# Patient Record
Sex: MALE | Race: BLACK OR AFRICAN AMERICAN | NOT HISPANIC OR LATINO | Employment: OTHER | URBAN - METROPOLITAN AREA
[De-identification: names, ages, dates, MRNs, and addresses within clinical notes are randomized per-mention and may not be internally consistent; named-entity substitution may affect disease eponyms.]

---

## 2018-03-15 ENCOUNTER — HOSPITAL ENCOUNTER (EMERGENCY)
Facility: CLINIC | Age: 73
Discharge: HOME | End: 2018-03-15
Attending: EMERGENCY MEDICINE | Admitting: EMERGENCY MEDICINE
Payer: COMMERCIAL

## 2018-03-15 VITALS
SYSTOLIC BLOOD PRESSURE: 172 MMHG | WEIGHT: 215 LBS | HEIGHT: 69 IN | BODY MASS INDEX: 31.84 KG/M2 | RESPIRATION RATE: 16 BRPM | TEMPERATURE: 97.8 F | OXYGEN SATURATION: 94 % | DIASTOLIC BLOOD PRESSURE: 127 MMHG | HEART RATE: 130 BPM

## 2018-03-15 DIAGNOSIS — R33.9 URINARY RETENTION: ICD-10-CM

## 2018-03-15 DIAGNOSIS — Z97.8 FOLEY CATHETER IN PLACE: ICD-10-CM

## 2018-03-15 LAB
ALBUMIN UR-MCNC: NEGATIVE MG/DL
ANION GAP SERPL CALCULATED.3IONS-SCNC: 9 MMOL/L (ref 3–14)
APPEARANCE UR: CLEAR
BASOPHILS # BLD AUTO: 0 10E9/L (ref 0–0.2)
BASOPHILS NFR BLD AUTO: 0.4 %
BILIRUB UR QL STRIP: NEGATIVE
BUN SERPL-MCNC: 17 MG/DL (ref 7–30)
CALCIUM SERPL-MCNC: 9.3 MG/DL (ref 8.5–10.1)
CHLORIDE SERPL-SCNC: 101 MMOL/L (ref 94–109)
CO2 SERPL-SCNC: 24 MMOL/L (ref 20–32)
COLOR UR AUTO: NORMAL
CREAT SERPL-MCNC: 1.02 MG/DL (ref 0.66–1.25)
DIFFERENTIAL METHOD BLD: ABNORMAL
EOSINOPHIL # BLD AUTO: 0.1 10E9/L (ref 0–0.7)
EOSINOPHIL NFR BLD AUTO: 0.9 %
ERYTHROCYTE [DISTWIDTH] IN BLOOD BY AUTOMATED COUNT: 11.6 % (ref 10–15)
GFR SERPL CREATININE-BSD FRML MDRD: 72 ML/MIN/1.7M2
GLUCOSE SERPL-MCNC: 124 MG/DL (ref 70–99)
GLUCOSE UR STRIP-MCNC: NEGATIVE MG/DL
HCT VFR BLD AUTO: 36.6 % (ref 40–53)
HGB BLD-MCNC: 12.9 G/DL (ref 13.3–17.7)
HGB UR QL STRIP: NEGATIVE
IMM GRANULOCYTES # BLD: 0 10E9/L (ref 0–0.4)
IMM GRANULOCYTES NFR BLD: 0.3 %
KETONES UR STRIP-MCNC: NEGATIVE MG/DL
LEUKOCYTE ESTERASE UR QL STRIP: NEGATIVE
LYMPHOCYTES # BLD AUTO: 1.6 10E9/L (ref 0.8–5.3)
LYMPHOCYTES NFR BLD AUTO: 20.7 %
MCH RBC QN AUTO: 30.8 PG (ref 26.5–33)
MCHC RBC AUTO-ENTMCNC: 35.2 G/DL (ref 31.5–36.5)
MCV RBC AUTO: 87 FL (ref 78–100)
MONOCYTES # BLD AUTO: 0.3 10E9/L (ref 0–1.3)
MONOCYTES NFR BLD AUTO: 3.8 %
NEUTROPHILS # BLD AUTO: 5.9 10E9/L (ref 1.6–8.3)
NEUTROPHILS NFR BLD AUTO: 73.9 %
NITRATE UR QL: NEGATIVE
NRBC # BLD AUTO: 0 10*3/UL
NRBC BLD AUTO-RTO: 0 /100
PH UR STRIP: 5.5 PH (ref 5–7)
PLATELET # BLD AUTO: 352 10E9/L (ref 150–450)
POTASSIUM SERPL-SCNC: 3.8 MMOL/L (ref 3.4–5.3)
RBC # BLD AUTO: 4.19 10E12/L (ref 4.4–5.9)
RBC #/AREA URNS AUTO: <1 /HPF (ref 0–2)
SODIUM SERPL-SCNC: 134 MMOL/L (ref 133–144)
SOURCE: NORMAL
SP GR UR STRIP: 1.01 (ref 1–1.03)
UROBILINOGEN UR STRIP-MCNC: NORMAL MG/DL (ref 0–2)
WBC # BLD AUTO: 7.9 10E9/L (ref 4–11)
WBC #/AREA URNS AUTO: 0 /HPF (ref 0–5)

## 2018-03-15 PROCEDURE — 85025 COMPLETE CBC W/AUTO DIFF WBC: CPT | Performed by: EMERGENCY MEDICINE

## 2018-03-15 PROCEDURE — 25000125 ZZHC RX 250: Performed by: EMERGENCY MEDICINE

## 2018-03-15 PROCEDURE — 80048 BASIC METABOLIC PNL TOTAL CA: CPT | Performed by: EMERGENCY MEDICINE

## 2018-03-15 PROCEDURE — 81001 URINALYSIS AUTO W/SCOPE: CPT | Performed by: EMERGENCY MEDICINE

## 2018-03-15 PROCEDURE — 51701 INSERT BLADDER CATHETER: CPT

## 2018-03-15 PROCEDURE — 99283 EMERGENCY DEPT VISIT LOW MDM: CPT

## 2018-03-15 RX ADMIN — LIDOCAINE HYDROCHLORIDE 10 ML: 20 JELLY TOPICAL at 10:02

## 2018-03-15 ASSESSMENT — ENCOUNTER SYMPTOMS
ABDOMINAL PAIN: 1
FEVER: 0
DIFFICULTY URINATING: 1
HEMATURIA: 0
VOMITING: 0

## 2018-03-15 NOTE — ED AVS SNAPSHOT
Emergency Department    [address] [facility] [address]    Phone:  [phone]    Fax:  [phone]                                       Sam Barry   MRN: [id number]    Department:   Emergency Department   Date of Visit:  3/15/2018           Patient Information     Date Of Birth          1945        Your diagnoses for this visit were:     Urinary retention     Cardenas catheter in place        You were seen by Kyrie Mir MD.      Follow-up Information     Follow up with Catalino Hodge MD. Schedule an appointment as soon as possible for a visit in 1 week.    Specialty:  Urology    Why:  For repeat evaluation and symptom check    Contact information:    [facility]  [address] [id number] [phone]          Follow up with  Emergency Department.    Specialty:  EMERGENCY MEDICINE    Why:  If symptoms worsen    Contact information:    [address] [facility] [address] [phone]        Discharge Instructions         Urinary Retention (Male)  Urinary retention is the medical term for difficulty or inability to pass urine, even though your bladder is full.  Causes  The most common cause of urinary retention in men is the bladder outlet being blocked. This can be due to an enlarged prostate gland or a bladder infection. Certain medicines can also cause this problem. This condition is more likely to occur as men get older.    This condition is treated by insertion of a catheter into the bladder to drain the urine. This provides immediate relief. The catheter may need to remain in place for a few days to prevent a recurrence. The catheter has a balloon on the tip which was inflated after insertion. This prevents the catheter from falling out.  Symptoms  Common symptoms of urinary retention include:    Pain (not experienced by everyone)    Frequent urination    Feeling that the bladder is still full after  urinating    Incontinence (not being able to control the release of urine)    Swollen abdomen  Treatment  This condition is treated by inserting a tube (catheter) into the bladder to drain the urine. This provides immediate relief. The catheter may need to stay in place for a few days. The catheter has a balloon on the tip, which is inflated after insertion. This prevents the catheter from falling out.  Home care    If you were given antibiotics, take them until they are used up, or your healthcare provider tells you to stop. It is important to finish the antibiotics even though you feel better. This is to make sure your infection has cleared.    If a catheter was left in place, it is important to keep bacteria from getting into the collection bag. Do not disconnect the catheter from the collection bag.    Use a leg band to secure the drainage tube, so it does not pull on the catheter. Drain the collection bag when it becomes full using the drain spout at the bottom of the bag.    Do not pull on or try to remove your catheter. This will injure your urethra. The catheter must be removed by a healthcare provider.  Follow-up care  Follow up with your healthcare provider, or as advised.  If a catheter was left in place, it can usually be removed within 3 to 7 days. Some conditions require the catheter to stay in longer. Your healthcare provider will tell you when to return to have the catheter removed.  When to seek medical advice  Call your healthcare provider right away if any of these occur:    Fever of 100.4 F (38 C) or higher, or as directed by your healthcare provider    Bladder or lower-abdominal pain or fullness    Abdominal swelling, nausea, vomiting, or back pain    Blood or urine leakage around the catheter    Bloody urine coming from the catheter (if a new symptom)    Weakness, dizziness, or fainting    Confusion or change in usual level of alertness    If a catheter was left in place, return if:    Catheter  falls out    Catheter stops draining for 6 hours  Date Last Reviewed: 7/26/2015 2000-2017 [employer]. [address] [place] [address], [place] [address]. All rights reserved. This information is not intended as a substitute for professional medical care. Always follow your healthcare professional's instructions.      As discussed today, your retention is likely due to either an enlarged prostate or possibly from medications in your over-the-counter cold medicine.  Please do not take any medicine and has diphenhydramine or Benadryl and it, and please do follow with Dr. Hodge or your own neurologist as we discussed.          24 Hour Appointment Hotline       To make an appointment at any [facility], call [phone] ([phone]). If you don't have a family doctor or clinic, we will help you find one. [facility] clinics are conveniently located to serve the needs of you and your family.             Review of your medicines      Notice     You have not been prescribed any medications.            Procedures and tests performed during your visit     Basic metabolic panel    CBC with platelets differential    UA with Microscopic      Orders Needing Specimen Collection     None      Pending Results     No orders found from 3/13/2018 to 3/16/2018.            Pending Culture Results     No orders found from 3/13/2018 to 3/16/2018.            Pending Results Instructions     If you had any lab results that were not finalized at the time of your Discharge, you can call the ED Lab Result RN at [phone]. You will be contacted by this team for any positive Lab results or changes in treatment. The nurses are available 7 days a week from 10A to 6:30P.  You can leave a message 24 hours per day and they will return your call.        Test Results From Your Hospital Stay        3/15/2018 10:23 AM      Component Results     Component Value Ref Range & Units Status    WBC 7.9 4.0 - 11.0 10e9/L Final    RBC Count  4.19 (L) 4.4 - 5.9 10e12/L Final    Hemoglobin 12.9 (L) 13.3 - 17.7 g/dL Final    Hematocrit 36.6 (L) 40.0 - 53.0 % Final    MCV 87 78 - 100 fl Final    MCH 30.8 26.5 - 33.0 pg Final    MCHC 35.2 31.5 - 36.5 g/dL Final    RDW 11.6 10.0 - 15.0 % Final    Platelet Count 352 150 - 450 10e9/L Final    Diff Method Automated Method  Final    % Neutrophils 73.9 % Final    % Lymphocytes 20.7 % Final    % Monocytes 3.8 % Final    % Eosinophils 0.9 % Final    % Basophils 0.4 % Final    % Immature Granulocytes 0.3 % Final    Nucleated RBCs 0 0 /100 Final    Absolute Neutrophil 5.9 1.6 - 8.3 10e9/L Final    Absolute Lymphocytes 1.6 0.8 - 5.3 10e9/L Final    Absolute Monocytes 0.3 0.0 - 1.3 10e9/L Final    Absolute Eosinophils 0.1 0.0 - 0.7 10e9/L Final    Absolute Basophils 0.0 0.0 - 0.2 10e9/L Final    Abs Immature Granulocytes 0.0 0 - 0.4 10e9/L Final    Absolute Nucleated RBC 0.0  Final         3/15/2018 10:46 AM      Component Results     Component Value Ref Range & Units Status    Sodium 134 133 - 144 mmol/L Final    Potassium 3.8 3.4 - 5.3 mmol/L Final    Chloride 101 94 - 109 mmol/L Final    Carbon Dioxide 24 20 - 32 mmol/L Final    Anion Gap 9 3 - 14 mmol/L Final    Glucose 124 (H) 70 - 99 mg/dL Final    Urea Nitrogen 17 7 - 30 mg/dL Final    Creatinine 1.02 0.66 - 1.25 mg/dL Final    GFR Estimate 72 >60 mL/min/1.7m2 Final    Non  GFR Calc    GFR Estimate If Black 87 >60 mL/min/1.7m2 Final    African American GFR Calc    Calcium 9.3 8.5 - 10.1 mg/dL Final         3/15/2018 10:24 AM      Component Results     Component Value Ref Range & Units Status    Color Urine Light Yellow  Final    Appearance Urine Clear  Final    Glucose Urine Negative NEG^Negative mg/dL Final    Bilirubin Urine Negative NEG^Negative Final    Ketones Urine Negative NEG^Negative mg/dL Final    Specific Gravity Urine 1.007 1.003 - 1.035 Final    Blood Urine Negative NEG^Negative Final    pH Urine 5.5 5.0 - 7.0 pH Final    Protein  Albumin Urine Negative NEG^Negative mg/dL Final    Urobilinogen mg/dL Normal 0.0 - 2.0 mg/dL Final    Nitrite Urine Negative NEG^Negative Final    Leukocyte Esterase Urine Negative NEG^Negative Final    Source Catheterized Urine  Final    WBC Urine 0 0 - 5 /HPF Final    RBC Urine <1 0 - 2 /HPF Final                Clinical Quality Measure: Blood Pressure Screening     Your blood pressure was checked while you were in the emergency department today. The last reading we obtained was  BP: (!) 172/127 . Please read the guidelines below about what these numbers mean and what you should do about them.  If your systolic blood pressure (the top number) is less than 120 and your diastolic blood pressure (the bottom number) is less than 80, then your blood pressure is normal. There is nothing more that you need to do about it.  If your systolic blood pressure (the top number) is 120-139 or your diastolic blood pressure (the bottom number) is 80-89, your blood pressure may be higher than it should be. You should have your blood pressure rechecked within a year by a primary care provider.  If your systolic blood pressure (the top number) is 140 or greater or your diastolic blood pressure (the bottom number) is 90 or greater, you may have high blood pressure. High blood pressure is treatable, but if left untreated over time it can put you at risk for heart attack, stroke, or kidney failure. You should have your blood pressure rechecked by a primary care provider within the next 4 weeks.  If your provider in the emergency department today gave you specific instructions to follow-up with your doctor or provider even sooner than that, you should follow that instruction and not wait for up to 4 weeks for your follow-up visit.        Thank you for choosing [facility]       Thank you for choosing [facility] for your care. Our goal is always to provide you with excellent care. Hearing back from our patients is one way we can continue to  "improve our services. Please take a few minutes to complete the written survey that you may receive in the mail after you visit with us. Thank you!        [employer] Information     [employer] lets you send messages to your doctor, view your test results, renew your prescriptions, schedule appointments and more. To sign up, go to [email or web address] . Click on \"Log in\" on the left side of the screen, which will take you to the Welcome page. Then click on \"Sign up Now\" on the right side of the page.     You will be asked to enter the access code listed below, as well as some personal information. Please follow the directions to create your username and password.     Your access code is: [email or web address]  Expires: 2018 11:42 AM     Your access code will  in 90 days. If you need help or a new code, please call your [place] clinic or [phone].        Care EveryWhere ID     This is your Care EveryWhere ID. This could be used by other organizations to access your [place] medical records  [id number]        Equal Access to Services     [facility] CenterESTEE : Hadii samira Chapa, wafrank cisneros, qaclaribel esquivelalparadise hanley, alejandro arguello. So [facility] [phone].    ATENCIÓN: Si habla español, tiene a de leon disposición servicios gratuitos de asistencia lingüística. Aida al [phone].    We comply with applicable federal civil rights laws and [place] laws. We do not discriminate on the basis of race, color, national origin, age, disability, sex, sexual orientation, or gender identity.            After Visit Summary       This is your record. Keep this with you and show to your community pharmacist(s) and doctor(s) at your next visit.                  "

## 2018-03-15 NOTE — ED AVS SNAPSHOT
Emergency Department    [address] [place] [address]    Phone:  [phone]    Fax:  [phone]                                       Sam Barry   MRN: [id number]    Department:   Emergency Department   Date of Visit:  3/15/2018           After Visit Summary Signature Page     I have received my discharge instructions, and my questions have been answered. I have discussed any challenges I see with this plan with the nurse or doctor.    ..........................................................................................................................................  Patient/Patient Representative Signature      ..........................................................................................................................................  Patient Representative Print Name and Relationship to Patient    ..................................................               ................................................  Date                                            Time    ..........................................................................................................................................  Reviewed by Signature/Title    ...................................................              ..............................................  Date                                                            Time

## 2018-03-15 NOTE — ED PROVIDER NOTES
"  History     Chief Complaint:  Urinary Urgency and Retention       HPI   Sam Barry is a 73 year old male with a history of hyperlipidemia and BPH who presents to the emergency department for evaluation of urinary retention and urgency. The patient states that he has had gradually increasing urinary urgency and inability to void his bladder for the past 1-2 days, stating that the last time he had a significant void was last night. He has had increasing suprapubic abdominal discomfort secondary to these symptoms as well. Of note, the patient has had issues with urinary retention in the past secondary to BPH. The patient does state that he has been taking Benadryl in the evening as of late. He denies any recent measured fevers, vomiting, or hematuria.       Allergies:  No Known Allergies     Medications:    Multivitamins    Past Medical History:    Mild cognitive impairment  Hyperlipidemia  Osteopenia  BPH  Tachycardia  Arthritis    Past Surgical History:    appendectomy    Family History:    DM    Social History:  Presents with his wife.   Negative for tobacco use.  Negative for alcohol use.  Marital Status:   [2]    Review of Systems   Constitutional: Negative for fever.   Gastrointestinal: Positive for abdominal pain. Negative for vomiting.   Genitourinary: Positive for difficulty urinating and urgency. Negative for hematuria.   All other systems reviewed and are negative.    Physical Exam     Patient Vitals for the past 24 hrs:   BP Temp Temp src Pulse Resp SpO2 Height Weight   03/15/18 1115 - - - - - 94 % - -   03/15/18 1100 - - - - - 93 % - -   03/15/18 1045 - - - - - 93 % - -   03/15/18 1030 - - - - - 95 % - -   03/15/18 1015 - - - - - 96 % - -   03/15/18 1000 - - - - - 98 % - -   03/15/18 0955 (!) 172/127 97.8  F (36.6  C) Oral 130 16 97 % 1.753 m (5' 9\") 97.5 kg (215 lb)       Physical Exam  Vitals: reviewed by me  General: Pt seen on [facility], pleasant, cooperative, and alert to " conversation  Eyes: Tracking well, clear conjunctiva BL  ENT: MMM, midline trachea.   Lungs:   No tachypnea, no accessory muscle use. No respiratory distress.   CV: Rate as above, regular rhythm.    Abd: Soft, non tender, no guarding, no rebound. Non distended  MSK: no peripheral edema or joint effusion.  No evidence of trauma.  Has Cardenas catheter in, with bag draining roughly 600 cc of clear yellow fluid.   exam- normal ext exam.     Skin: No rash, normal turgor and temperature  Neuro: Clear speech and no facial droop.  Psych: Not RIS, no e/o AH/VH      Emergency Department Course   Laboratory:  CBC: WBC: 7.9, HGB: 12.9 (L), PLT: 352  BMP: Glucose 124 (H), o/w WNL (Creatinine: 1.02)    UA with micro (catheterized): all negative    Interventions:  1002 Lidocaine 2% 10 mL Urethral     Emergency Department Course:  Nursing notes and vitals reviewed. 1003 I performed an exam of the patient as documented above.     The patient had a urinary catheter placed here in the emergency department without difficulty. The patient provided a urine sample here in the emergency department. This was sent for laboratory testing, findings above.     Blood drawn. This was sent to the lab for further testing, results above.    1129 I rechecked the patient and discussed the results of his workup thus far.     Findings and plan explained to the Patient. Patient discharged home with instructions regarding supportive care, medications, and reasons to return. The importance of close follow-up was reviewed.     I personally reviewed the laboratory results with the Patient and answered all related questions prior to discharge.     Impression & Plan    Medical Decision Making:  Sam Barry is a 73 year old male who presents to the ED with what appears to be urinary retention. I feel that this diagnosis is supported by the fact that the patient had a large suprapubic abdominal mass, and that all of his pain has been resolved with Cardenas  placement, draining roughly 600-700 ccs of urine. The patient has a normal creatinine here, no evidence of renal failure. No evidence of UTI, no systemic signs of infection. The patient may have two reasons for his urinary retention today as he does have a history of enlarged prostate and also has had a cold lately and has taken lots of Benadryl containing OTC medications.  The patient is stable to be discharged with a leg bag to follow up with his previous urologist, or Dr. Ahumada, who is on call for urology next week. The patient is good with this plan and no signs of systemic infection. All questions were answered and the patient was discharged in good condition.     Diagnosis:    ICD-10-CM   1. Urinary retention R33.9   2. Cardenas catheter in place Z92.89       Disposition:  discharged to home    Ernestina MCCRACKEN, am serving as a scribe on 3/15/2018 at 10:03 AM to personally document services performed by Kyrie Mir MD based on my observations and the provider's statements to me.       Ernestina Burton   3/15/2018    EMERGENCY DEPARTMENT       Kyrie Mir MD  03/15/18 0523

## 2018-03-15 NOTE — DISCHARGE INSTRUCTIONS
Urinary Retention (Male)  Urinary retention is the medical term for difficulty or inability to pass urine, even though your bladder is full.  Causes  The most common cause of urinary retention in men is the bladder outlet being blocked. This can be due to an enlarged prostate gland or a bladder infection. Certain medicines can also cause this problem. This condition is more likely to occur as men get older.    This condition is treated by insertion of a catheter into the bladder to drain the urine. This provides immediate relief. The catheter may need to remain in place for a few days to prevent a recurrence. The catheter has a balloon on the tip which was inflated after insertion. This prevents the catheter from falling out.  Symptoms  Common symptoms of urinary retention include:    Pain (not experienced by everyone)    Frequent urination    Feeling that the bladder is still full after urinating    Incontinence (not being able to control the release of urine)    Swollen abdomen  Treatment  This condition is treated by inserting a tube (catheter) into the bladder to drain the urine. This provides immediate relief. The catheter may need to stay in place for a few days. The catheter has a balloon on the tip, which is inflated after insertion. This prevents the catheter from falling out.  Home care    If you were given antibiotics, take them until they are used up, or your healthcare provider tells you to stop. It is important to finish the antibiotics even though you feel better. This is to make sure your infection has cleared.    If a catheter was left in place, it is important to keep bacteria from getting into the collection bag. Do not disconnect the catheter from the collection bag.    Use a leg band to secure the drainage tube, so it does not pull on the catheter. Drain the collection bag when it becomes full using the drain spout at the bottom of the bag.    Do not pull on or try to remove your catheter.  This will injure your urethra. The catheter must be removed by a healthcare provider.  Follow-up care  Follow up with your healthcare provider, or as advised.  If a catheter was left in place, it can usually be removed within 3 to 7 days. Some conditions require the catheter to stay in longer. Your healthcare provider will tell you when to return to have the catheter removed.  When to seek medical advice  Call your healthcare provider right away if any of these occur:    Fever of 100.4 F (38 C) or higher, or as directed by your healthcare provider    Bladder or lower-abdominal pain or fullness    Abdominal swelling, nausea, vomiting, or back pain    Blood or urine leakage around the catheter    Bloody urine coming from the catheter (if a new symptom)    Weakness, dizziness, or fainting    Confusion or change in usual level of alertness    If a catheter was left in place, return if:    Catheter falls out    Catheter stops draining for 6 hours  Date Last Reviewed: 7/26/2015 2000-2017 [employer]. [address] [place] [address]. All rights reserved. This information is not intended as a substitute for professional medical care. Always follow your healthcare professional's instructions.      As discussed today, your retention is likely due to either an enlarged prostate or possibly from medications in your over-the-counter cold medicine.  Please do not take any medicine and has diphenhydramine or Benadryl and it, and please do follow with Dr. Hodge or your own neurologist as we discussed.

## 2018-03-15 NOTE — ED NOTES
Jean Pierre LUNDY changing over to leg bag with teaching and an additional leg bag given per request by wife.

## 2018-03-17 ENCOUNTER — HOSPITAL ENCOUNTER (EMERGENCY)
Facility: CLINIC | Age: 73
Discharge: HOME | End: 2018-03-17
Attending: EMERGENCY MEDICINE | Admitting: EMERGENCY MEDICINE
Payer: COMMERCIAL

## 2018-03-17 VITALS
OXYGEN SATURATION: 96 % | RESPIRATION RATE: 18 BRPM | TEMPERATURE: 98.4 F | SYSTOLIC BLOOD PRESSURE: 138 MMHG | DIASTOLIC BLOOD PRESSURE: 109 MMHG

## 2018-03-17 DIAGNOSIS — Z46.6 URINARY CATHETER (FOLEY) CHANGE REQUIRED: ICD-10-CM

## 2018-03-17 PROCEDURE — 51702 INSERT TEMP BLADDER CATH: CPT

## 2018-03-17 PROCEDURE — 99283 EMERGENCY DEPT VISIT LOW MDM: CPT

## 2018-03-17 NOTE — ED NOTES
Pt and wife were instructed on good catheater care and how to change out the drainage bags. Pt and wife were given the plugs both grey for leg bag and yellow for larger bag. Good placement and urine returned from [place].

## 2018-03-17 NOTE — ED AVS SNAPSHOT
Emergency Department    [address] [place] [address]    Phone:  [phone]    Fax:  [phone]                                       Sam Barry   MRN: [id number]    Department:   Emergency Department   Date of Visit:  3/17/2018           Patient Information     Date Of Birth          1945        Your diagnoses for this visit were:     Urinary catheter (Cardenas) change required        You were seen by Ricki Roman MD.      Follow-up Information     Please follow up.    Why:  with your urologist in monday        Discharge Instructions         Cardenas Catheter Care    A Cardenas catheter is a rubber tube that is placed through the urethra (opening where urine comes out) and into the bladder. This helps drain urine from the bladder. There is a small balloon on the end of the tube that is inflated after insertion. This keeps the catheter from sliding out of the bladder.  A Cardenas catheter is used to treat urinary retention (unable to pass urine). It is also used when there is incontinence (loss of bladder control).  Home care    Finish taking any prescribed antibiotic even if you are feeling better before then.    It is important to keep bacteria from getting into the collection bag. Do not disconnect the catheter from the collection bag.    Use a leg band to secure the drainage tube, so it does not pull on the catheter. Drain the collection bag when it becomes full using the drain spout at the bottom of the bag.    Do not try to pull or remove your catheter. This will injure your urethra. It must be removed by your healthcare provider or nurse.  Follow-up care  Follow up with your healthcare provider as advised for repeat urine testing and catheter removal or replacement.  When to seek medical advice  Call your healthcare provider right away if any of these occur:    Fever of 100.4 F (38 C) or higher, or as directed by your healthcare provider    Bladder pain or  fullness    Abdominal swelling, nausea or vomiting, or back pain    Blood or urine leakage around the catheter    Bloody urine coming from the catheter (if a new symptom)    Catheter falls out    Catheter stops draining for 6 hours    Weakness, dizziness, or fainting  Date Last Reviewed: 10/1/2016    2074-8718 [employer]. [address] [place] [address]. All rights reserved. This information is not intended as a substitute for professional medical care. Always follow your healthcare professional's instructions.          Future Appointments        Provider Department Dept Phone Center    3/19/2018 4:00 PM Barbara Varghese PA-C, [facility] [place] [phone] [facility] [place]      24 Hour Appointment Hotline       To make an appointment at any [facility], call [phone] ([phone]). If you don't have a family doctor or clinic, we will help you find one. [facility] are conveniently located to serve the needs of you and your family.             Review of your medicines      Notice     You have not been prescribed any medications.            Orders Needing Specimen Collection     None      Pending Results     No orders found from 3/15/2018 to 3/18/2018.            Pending Culture Results     No orders found from 3/15/2018 to 3/18/2018.            Pending Results Instructions     If you had any lab results that were not finalized at the time of your Discharge, you can call the ED Lab Result RN at [phone]. You will be contacted by this team for any positive Lab results or changes in treatment. The nurses are available 7 days a week from 10A to 6:30P.  You can leave a message 24 hours per day and they will return your call.        Test Results From Your Hospital Stay               Clinical Quality Measure: Blood Pressure Screening     Your blood pressure was checked while you were in the emergency department today. The last reading we  "obtained was  BP: (!) 138/109 . Please read the guidelines below about what these numbers mean and what you should do about them.  If your systolic blood pressure (the top number) is less than 120 and your diastolic blood pressure (the bottom number) is less than 80, then your blood pressure is normal. There is nothing more that you need to do about it.  If your systolic blood pressure (the top number) is 120-139 or your diastolic blood pressure (the bottom number) is 80-89, your blood pressure may be higher than it should be. You should have your blood pressure rechecked within a year by a primary care provider.  If your systolic blood pressure (the top number) is 140 or greater or your diastolic blood pressure (the bottom number) is 90 or greater, you may have high blood pressure. High blood pressure is treatable, but if left untreated over time it can put you at risk for heart attack, stroke, or kidney failure. You should have your blood pressure rechecked by a primary care provider within the next 4 weeks.  If your provider in the emergency department today gave you specific instructions to follow-up with your doctor or provider even sooner than that, you should follow that instruction and not wait for up to 4 weeks for your follow-up visit.        Thank you for choosing [facility]       Thank you for choosing [facility] for your care. Our goal is always to provide you with excellent care. Hearing back from our patients is one way we can continue to improve our services. Please take a few minutes to complete the written survey that you may receive in the mail after you visit with us. Thank you!        [employer] Information     [employer] lets you send messages to your doctor, view your test results, renew your prescriptions, schedule appointments and more. To sign up, go to [email or web address] . Click on \"Log in\" on the left side of the screen, which will take you to the Welcome page. Then click on \"Sign up Now\" on " the right side of the page.     You will be asked to enter the access code listed below, as well as some personal information. Please follow the directions to create your username and password.     Your access code is: [email or web address]  Expires: 2018 11:42 AM     Your access code will  in 90 days. If you need help or a new code, please call your [facility] clinic or [phone].        Care EveryWhere ID     This is your Care EveryWhere ID. This could be used by other organizations to access your [facility] medical records  [id number]        Equal Access to Services     [facility] : Neha Chapa, alyse cisneros, nilda hanley, alejandro murray . So [facility] [phone].    ATENCIÓN: Si habla español, tiene a de leon disposición servicios gratuitos de asistencia lingüística. Llame al [phone].    We comply with applicable federal civil rights laws and [place] laws. We do not discriminate on the basis of race, color, national origin, age, disability, sex, sexual orientation, or gender identity.            After Visit Summary       This is your record. Keep this with you and show to your community pharmacist(s) and doctor(s) at your next visit.

## 2018-03-17 NOTE — ED PROVIDER NOTES
"  History     Chief Complaint:    Catheter Problem       HPI   Sam Barry is a 73 year old male with a history of hyperlipidemia and BPH who presents to the ED today with a catheter problem. The patient was seen here in the ED 2 days ago for urinary urgency and retention. At that time, he was complaining of gradually increased urgency and the inability to void his bladder for 1-2 days prior. He had increased suprapubic abdominal discomfort at the time as well. Of note, the patient has had issues with urinary retention in the past secondary to BPH. The patient had a urinary catheter placed at the time and had negative urinalysis results. He was discharged home with a leg bag and told to follow up with  Dr. Ahumada, a urologist, next week. The patient presents to the ED today, stating that his \"urinary bag came off.\" The patient's wife states that the patient has been \"leaking\" urine since he was discharged from the ED. She states that they woke up in the morning yesterday and the bed was wet since the urine was not going into the bag. She believed that it was because they were not given a night bag for the urine, but even after receiving one, still had similar problems. She brought the patient in to the ED today so that his catheter could be fixed.     Allergies:  No Known Allergies      Medications:    Multivitamins     Past Medical History:    Mild cognitive impairment  Hyperlipidemia  Osteopenia  BPH  Tachycardia  Arthritis     Past Surgical History:    appendectomy     Family History:    DM    Social History:  Marital Status:    Presents to the ED with wife   Tobacco Use: never smoker   Alcohol Use: no   PCP: [facility]     Review of Systems   All other systems reviewed and are negative.    Physical Exam     Patient Vitals for the past 24 hrs:   BP Temp Temp src Heart Rate SpO2   03/17/18 0838 (!) 138/109 98.4  F (36.9  C) Oral 119 96 %        Physical Exam  General: No distress.   Head: " No signs of trauma.   Mouth/Throat: Oropharynx moist.   Eyes: Conjunctivae are normal. Pupils are equal..   Neck: Normal range of motion.    Resp:No respiratory distress.   MSK: Normal range of motion. No obvious deformity.  Neuro: The patient is alert and interactive. MOYA. Speech normal. GCS 15  Skin: No lesion or sign of trauma noted.   Psych: normal mood and affect. behavior is normal.      Emergency Department Course    Emergency Department Course:  Nursing notes and vitals reviewed.  (4487) I performed an exam of the patient as documented above.   Patient's catheter was removed and replaced.   Findings and plan explained to the patient and his wife. Patient discharged home with instructions regarding supportive care, medications, and reasons to return. The importance of close follow-up was reviewed.  Impression & Plan    Medical Decision Making:  Sam Barry is a 73 year old male who presents for concerns with his clark catheter. The patient was seen two days ago for urinary retention and urgency and had a clark catheter placed at the time. He presented to the ED again due to problems with his catheter draining into the bag. Patient's clark catheter was changed while in the ED. Patient is stable for discharge home.      Diagnosis:    ICD-10-CM    1. Urinary catheter (Clark) change required Z46.6        Disposition:  discharged to home    IKeri, am serving as a scribe on 3/17/2018 at 8:56 AM to personally document services performed by Dr. Roman based on my observations and the provider's statements to me.    3/17/2018    EMERGENCY DEPARTMENT       Ricki Roman MD  03/18/18 0616

## 2018-03-17 NOTE — ED AVS SNAPSHOT
Emergency Department    [address] [place] [address]    Phone:  [phone]    Fax:  [phone]                                       Sam Barry   MRN: [id number]    Department:   Emergency Department   Date of Visit:  3/17/2018           After Visit Summary Signature Page     I have received my discharge instructions, and my questions have been answered. I have discussed any challenges I see with this plan with the nurse or doctor.    ..........................................................................................................................................  Patient/Patient Representative Signature      ..........................................................................................................................................  Patient Representative Print Name and Relationship to Patient    ..................................................               ................................................  Date                                            Time    ..........................................................................................................................................  Reviewed by Signature/Title    ...................................................              ..............................................  Date                                                            Time

## 2018-03-17 NOTE — DISCHARGE INSTRUCTIONS
Cardenas Catheter Care    A Cardenas catheter is a rubber tube that is placed through the urethra (opening where urine comes out) and into the bladder. This helps drain urine from the bladder. There is a small balloon on the end of the tube that is inflated after insertion. This keeps the catheter from sliding out of the bladder.  A Cardenas catheter is used to treat urinary retention (unable to pass urine). It is also used when there is incontinence (loss of bladder control).  Home care    Finish taking any prescribed antibiotic even if you are feeling better before then.    It is important to keep bacteria from getting into the collection bag. Do not disconnect the catheter from the collection bag.    Use a leg band to secure the drainage tube, so it does not pull on the catheter. Drain the collection bag when it becomes full using the drain spout at the bottom of the bag.    Do not try to pull or remove your catheter. This will injure your urethra. It must be removed by your healthcare provider or nurse.  Follow-up care  Follow up with your healthcare provider as advised for repeat urine testing and catheter removal or replacement.  When to seek medical advice  Call your healthcare provider right away if any of these occur:    Fever of 100.4 F (38 C) or higher, or as directed by your healthcare provider    Bladder pain or fullness    Abdominal swelling, nausea or vomiting, or back pain    Blood or urine leakage around the catheter    Bloody urine coming from the catheter (if a new symptom)    Catheter falls out    Catheter stops draining for 6 hours    Weakness, dizziness, or fainting  Date Last Reviewed: 10/1/2016    5903-0510 [employer]. [address] [place] [address], [place] [address]. All rights reserved. This information is not intended as a substitute for professional medical care. Always follow your healthcare professional's instructions.

## 2018-03-19 ENCOUNTER — OFFICE VISIT (OUTPATIENT)
Dept: UROLOGY | Facility: CLINIC | Age: 73
End: 2018-03-19
Payer: COMMERCIAL

## 2018-03-19 VITALS
BODY MASS INDEX: 31.84 KG/M2 | WEIGHT: 215 LBS | DIASTOLIC BLOOD PRESSURE: 88 MMHG | HEIGHT: 69 IN | SYSTOLIC BLOOD PRESSURE: 138 MMHG

## 2018-03-19 DIAGNOSIS — N40.1 BENIGN PROSTATIC HYPERPLASIA WITH URINARY RETENTION: Primary | ICD-10-CM

## 2018-03-19 DIAGNOSIS — K59.00 CONSTIPATION, UNSPECIFIED CONSTIPATION TYPE: ICD-10-CM

## 2018-03-19 DIAGNOSIS — R33.8 BENIGN PROSTATIC HYPERPLASIA WITH URINARY RETENTION: Primary | ICD-10-CM

## 2018-03-19 PROCEDURE — 99202 OFFICE O/P NEW SF 15 MIN: CPT | Performed by: PHYSICIAN ASSISTANT

## 2018-03-19 RX ORDER — SENNOSIDES 8.6 MG
1-3 TABLET ORAL DAILY PRN
Qty: 20 TABLET | Refills: 0 | Status: SHIPPED | OUTPATIENT
Start: 2018-03-19 | End: 2023-02-09

## 2018-03-19 RX ORDER — TAMSULOSIN HYDROCHLORIDE 0.4 MG/1
0.4 CAPSULE ORAL DAILY
Qty: 30 CAPSULE | Refills: 1 | Status: SHIPPED | OUTPATIENT
Start: 2018-03-19 | End: 2018-08-07

## 2018-03-19 RX ORDER — DIPHENOXYLATE HYDROCHLORIDE AND ATROPINE SULFATE 2.5; .025 MG/1; MG/1
1 TABLET ORAL DAILY
COMMUNITY
Start: 2017-12-21

## 2018-03-19 RX ORDER — OXYBUTYNIN CHLORIDE 5 MG/1
5 TABLET, EXTENDED RELEASE ORAL DAILY
Qty: 7 TABLET | Refills: 0 | Status: SHIPPED | OUTPATIENT
Start: 2018-03-19 | End: 2018-09-17

## 2018-03-19 ASSESSMENT — PAIN SCALES - GENERAL: PAINLEVEL: EXTREME PAIN (8)

## 2018-03-19 NOTE — NURSING NOTE
Chief Complaint   Patient presents with     Consult     Pt having voiding issues. Pt was cathed in ER.     Anh Nam

## 2018-03-19 NOTE — PROGRESS NOTES
"CC: Urinary retention.    HPI: It is a pleasure to see . Sam Barry, a 73 year old male seen today in the urology clinic in consultation from  ER for evaluation of urinary retention. This developed acutely requiring Cardenas catheter placement on 3/15/18. This has been draining well with pale, yellow urine. The patient is tolerating the catheter without too much issue, but some bladder spasms. No clots of hematuria noted.    The patient has no prior history of AUR or similar symptoms. At baseline, patient does note some weak stream, dribbling.  Was on Flomax in 2015 and stopped this because he did not want to be on medications. Currently denies fevers, chills, N/V, abdominal/back pain.    No past medical history on file.  No past surgical history on file.  Current Outpatient Prescriptions   Medication Sig Dispense Refill     cholecalciferol (D3-50) 04154 UNITS capsule Take 50,000 Units by mouth       tamsulosin (FLOMAX) 0.4 MG capsule Take 1 capsule (0.4 mg) by mouth daily 30 capsule 1     sennosides (SENOKOT) 8.6 MG tablet Take 1-3 tablets by mouth daily as needed for constipation 20 tablet 0     oxybutynin (DITROPAN XL) 5 MG 24 hr tablet Take 1 tablet (5 mg) by mouth daily 7 tablet 0     cod liver oil CAPS capsule Take 1 capsule by mouth       Multiple Vitamin (MULTI-VITAMINS) TABS Take 1 tablet by mouth       No Known Allergies  Family History: There is no h/o  malignancy.  There is no h/o urolithiasis.     Social History: The patient does not smoke cigarettes.  Denies EtOH and illicit drug use.      ROS: A comprehensive 14 point ROS was obtained and was  otherwise negative except for that outlined above in the HPI.    PHYSICAL EXAM:   Vitals:    03/19/18 1553   BP: 138/88   Weight: 97.5 kg (215 lb)   Height: 1.753 m (5' 9\")     GENERAL: Well groomed/well developed/well nourished male in NAD.  HEENT: EOMI, AT, NC.  SKIN: Warm to touch, dry.  No visible rashes or lesions.  RESP: No increased respiratory " effort.  LYMPH: No LE edema.  ABD: Soft, NT, ND.  No CVAT.  MS: Full ROM in extremities.  : Cardenas catheter indwelling draining yellow urine.  NEURO: Alert and oriented x 3.  PSYCH: Normal mood and affect, pleasant and agreeable during interview and exam.    REVIEW OF OUTSIDE RECORDS: 5 minutes spent reviewing previous/outside records.    ASSESSMENT/PLAN:  73 year old male who developed acute urinary retention requiring Cardenas catheter placement. Has not been taking tamsulosin daily.   -Delay TOV 7 days  -Start Flomax and can take oxybutynin for spasms. Bowel regimen.  -Reviewed TOV procedure and follow up if passes next week.  -If he wishes to be off medications, will need cysto to determine if he is a surgical candidate. Will discuss further in follow up.     I have enjoyed participating in the medical care of this patient.  Please do not hesitate to contact me with any questions or concerns.      Barbara Varghese PA-C  [facility]    20 min spent with the patient, >50% of this time was spent in a face-to-face manner and on coordination of care of urinary retention.

## 2018-03-19 NOTE — PATIENT INSTRUCTIONS
Flomax and start after evening meal.     Senna is the stool softner.     See you in 1 week for trail of void.     Ditropan is to help relax the bladder with the cath in place.    Creatinine (kidneys) 1.02.

## 2018-03-19 NOTE — MR AVS SNAPSHOT
After Visit Summary   3/19/2018    Sam Barry    MRN: [id number]           Patient Information     Date Of Birth          1945        Visit Information        Provider Department      3/19/2018 4:00 PM Barbara Varghese PA-C [facility] [place]        Today's Diagnoses     Benign prostatic hyperplasia with urinary retention    -  1    Constipation, unspecified constipation type          Care Instructions     Flomax and start after evening meal.     Senna is the stool softner.     See you in 1 week for trail of void.     Ditropan is to help relax the bladder with the cath in place.    Creatinine (kidneys) 1.02.          Follow-ups after your visit        Your next 10 appointments already scheduled     Mar 26, 2018 10:30 AM CDT   Return Visit with Barbara Varghese PA-C   [facility] [place] ([facility] [place])    [address]  [address]  [facility] [id number] [phone]              Who to contact     If you have questions or need follow up information about today's clinic visit or your schedule please contact [facility] UROLOGY [facility] directly at [phone].  Normal or non-critical lab and imaging results will be communicated to you by MyChart, letter or phone within 4 business days after the clinic has received the results. If you do not hear from us within 7 days, please contact the clinic through MyChart or phone. If you have a critical or abnormal lab result, we will notify you by phone as soon as possible.  Submit refill requests through [employer] or call your pharmacy and they will forward the refill request to us. Please allow 3 business days for your refill to be completed.          Additional Information About Your Visit        MyChart Information     [employer] lets you send messages to your doctor, view your test results, renew your prescriptions, schedule appointments  "and more. To sign up, go to [email or web address] . Click on \"Log in\" on the left side of the screen, which will take you to the Welcome page. Then click on \"Sign up Now\" on the right side of the page.     You will be asked to enter the access code listed below, as well as some personal information. Please follow the directions to create your username and password.     Your access code is: [email or web address]  Expires: 2018 11:42 AM     Your access code will  in 90 days. If you need help or a new code, please call your [place] clinic or [phone].        Care EveryWhere ID     This is your Care EveryWhere ID. This could be used by other organizations to access your [place] medical records  [id number]        Your Vitals Were     Height BMI (Body Mass Index)                1.753 m (5' 9\") 31.75 kg/m2           Blood Pressure from Last 3 Encounters:   18 138/88   18 (!) 138/109   03/15/18 (!) 172/127    Weight from Last 3 Encounters:   18 97.5 kg (215 lb)   03/15/18 97.5 kg (215 lb)   16 95.3 kg (210 lb)              Today, you had the following     No orders found for display         Today's Medication Changes          These changes are accurate as of 3/19/18  4:14 PM.  If you have any questions, ask your nurse or doctor.               Start taking these medicines.        Dose/Directions    oxybutynin 5 MG 24 hr tablet   Commonly known as:  DITROPAN XL   Used for:  Benign prostatic hyperplasia with urinary retention   Started by:  Barbara Varghese PA-C        Dose:  5 mg   Take 1 tablet (5 mg) by mouth daily   Quantity:  7 tablet   Refills:  0       sennosides 8.6 MG tablet   Commonly known as:  SENOKOT   Used for:  Constipation, unspecified constipation type   Started by:  Barbara Varghese PA-C        Dose:  1-3 tablet   Take 1-3 tablets by mouth daily as needed for constipation   Quantity:  20 tablet   Refills:  0       tamsulosin 0.4 MG capsule   Commonly known as:  FLOMAX "   Used for:  Benign prostatic hyperplasia with urinary retention   Started by:  Barbara Varghese PA-C        Dose:  0.4 mg   Take 1 capsule (0.4 mg) by mouth daily   Quantity:  30 capsule   Refills:  1            Where to get your medicines      These medications were sent to [facility] [place] - [address]  [address], [place] [address]     Phone:  [phone]     oxybutynin 5 MG 24 hr tablet    sennosides 8.6 MG tablet    tamsulosin 0.4 MG capsule                Primary Care Provider Office Phone # Fax #    Jovan [facility] [phone]       [address] [place] [address]        Equal Access to Services     RAI VITALE : Hadii samira jennings hadasho Soomaali, waaxda luqadaha, qaybta kaalmada adeegyada, waxay thien arguello. So [facility] [phone].    ATENCIÓN: Si habla español, tiene a de leon disposición servicios gratuitos de asistencia lingüística. [facility] [phone].    We comply with applicable federal civil rights laws and [place] laws. We do not discriminate on the basis of race, color, national origin, age, disability, sex, sexual orientation, or gender identity.            Thank you!     Thank you for choosing [facility] UROLOGY CLINIC [place]  for your care. Our goal is always to provide you with excellent care. Hearing back from our patients is one way we can continue to improve our services. Please take a few minutes to complete the written survey that you may receive in the mail after your visit with us. Thank you!             Your Updated Medication List - Protect others around you: Learn how to safely use, store and throw away your medicines at www.disposemymeds.org.          This list is accurate as of 3/19/18  4:14 PM.  Always use your most recent med list.                   Brand Name Dispense Instructions for use Diagnosis    cod liver oil Caps capsule      Take 1 capsule by mouth        D3-50  77149 UNITS capsule   Generic drug:  cholecalciferol      Take 50,000 Units by mouth        MULTI-VITAMINS Tabs      Take 1 tablet by mouth        oxybutynin 5 MG 24 hr tablet    DITROPAN XL    7 tablet    Take 1 tablet (5 mg) by mouth daily    Benign prostatic hyperplasia with urinary retention       sennosides 8.6 MG tablet    SENOKOT    20 tablet    Take 1-3 tablets by mouth daily as needed for constipation    Constipation, unspecified constipation type       tamsulosin 0.4 MG capsule    FLOMAX    30 capsule    Take 1 capsule (0.4 mg) by mouth daily    Benign prostatic hyperplasia with urinary retention

## 2018-03-19 NOTE — LETTER
3/19/2018       RE: Sam Barry  [address] [facility] [address]     Dear Colleague,    Thank you for referring your patient, Sam Barry, to the [facility]OLOGY CLINIC [place] at [facility]. Please see a copy of my visit note below.    CC: Urinary retention.    HPI: It is a pleasure to see . Sam Barry, a 73 year old male seen today in the urology clinic in consultation from [facility] for evaluation of urinary retention. This developed acutely requiring Cardenas catheter placement on 3/15/18. This has been draining well with pale, yellow urine. The patient is tolerating the catheter without too much issue, but some bladder spasms. No clots of hematuria noted.    The patient has no prior history of AUR or similar symptoms. At baseline, patient does note some weak stream, dribbling.  Was on Flomax in 2015 and stopped this because he did not want to be on medications. Currently denies fevers, chills, N/V, abdominal/back pain.    No past medical history on file.  No past surgical history on file.  Current Outpatient Prescriptions   Medication Sig Dispense Refill     cholecalciferol (D3-50) 03238 UNITS capsule Take 50,000 Units by mouth       tamsulosin (FLOMAX) 0.4 MG capsule Take 1 capsule (0.4 mg) by mouth daily 30 capsule 1     sennosides (SENOKOT) 8.6 MG tablet Take 1-3 tablets by mouth daily as needed for constipation 20 tablet 0     oxybutynin (DITROPAN XL) 5 MG 24 hr tablet Take 1 tablet (5 mg) by mouth daily 7 tablet 0     cod liver oil CAPS capsule Take 1 capsule by mouth       Multiple Vitamin (MULTI-VITAMINS) TABS Take 1 tablet by mouth       No Known Allergies  Family History: There is no h/o  malignancy.  There is no h/o urolithiasis.     Social History: The patient does not smoke cigarettes.  Denies EtOH and illicit drug use.      ROS: A comprehensive 14 point ROS was obtained and was  otherwise negative except  "for that outlined above in the HPI.    PHYSICAL EXAM:   Vitals:    03/19/18 1553   BP: 138/88   Weight: 97.5 kg (215 lb)   Height: 1.753 m (5' 9\")     GENERAL: Well groomed/well developed/well nourished male in NAD.  HEENT: EOMI, AT, NC.  SKIN: Warm to touch, dry.  No visible rashes or lesions.  RESP: No increased respiratory effort.  LYMPH: No LE edema.  ABD: Soft, NT, ND.  No CVAT.  MS: Full ROM in extremities.  : Cardenas catheter indwelling draining yellow urine.  NEURO: Alert and oriented x 3.  PSYCH: Normal mood and affect, pleasant and agreeable during interview and exam.    REVIEW OF OUTSIDE RECORDS: 5 minutes spent reviewing previous/outside records.    ASSESSMENT/PLAN:  73 year old male who developed acute urinary retention requiring Cardenas catheter placement. Has not been taking tamsulosin daily.   -Delay TOV 7 days  -Start Flomax and can take oxybutynin for spasms. Bowel regimen.  -Reviewed TOV procedure and follow up if passes next week.  -If he wishes to be off medications, will need cysto to determine if he is a surgical candidate. Will discuss further in follow up.     I have enjoyed participating in the medical care of this patient.  Please do not hesitate to contact me with any questions or concerns.        20 min spent with the patient, >50% of this time was spent in a face-to-face manner and on coordination of care of urinary retention.       Again, thank you for allowing me to participate in the care of your patient.      Sincerely,    Barbara Varghese PA-C, LARRY      "

## 2018-03-26 ENCOUNTER — OFFICE VISIT (OUTPATIENT)
Dept: UROLOGY | Facility: CLINIC | Age: 73
End: 2018-03-26
Payer: COMMERCIAL

## 2018-03-26 VITALS
WEIGHT: 215 LBS | HEIGHT: 69 IN | BODY MASS INDEX: 31.84 KG/M2 | HEART RATE: 80 BPM | DIASTOLIC BLOOD PRESSURE: 66 MMHG | SYSTOLIC BLOOD PRESSURE: 124 MMHG

## 2018-03-26 DIAGNOSIS — R33.9 URINARY RETENTION: Primary | ICD-10-CM

## 2018-03-26 PROCEDURE — 51798 US URINE CAPACITY MEASURE: CPT | Performed by: PHYSICIAN ASSISTANT

## 2018-03-26 PROCEDURE — 99213 OFFICE O/P EST LOW 20 MIN: CPT | Mod: 25 | Performed by: PHYSICIAN ASSISTANT

## 2018-03-26 ASSESSMENT — PAIN SCALES - GENERAL: PAINLEVEL: NO PAIN (0)

## 2018-03-26 NOTE — NURSING NOTE
Chief Complaint   Patient presents with     Clinic Care Coordination - Follow-up     SIMA Cruz LPN

## 2018-03-26 NOTE — MR AVS SNAPSHOT
"              After Visit Summary   3/26/2018    Sam Barry    MRN: [id number]           Patient Information     Date Of Birth          1945        Visit Information        Provider Department      3/26/2018 10:30 AM Barbara Varghese PA-C [facility] [place]         Follow-ups after your visit        Your next 10 appointments already scheduled     Apr 30, 2018 10:30 AM CDT   Return Visit with Barbara Varghese PA-C   [facility] [place] ([facility] [place])    [address]  [address]  [facility] [id number] [phone]              Who to contact     If you have questions or need follow up information about today's clinic visit or your schedule please contact [facility]OLOGY [facility] [place] directly at [phone].  Normal or non-critical lab and imaging results will be communicated to you by [employer], letter or phone within 4 business days after the clinic has received the results. If you do not hear from us within 7 days, please contact the clinic through [employer] or phone. If you have a critical or abnormal lab result, we will notify you by phone as soon as possible.  Submit refill requests through [employer] or call your pharmacy and they will forward the refill request to us. Please allow 3 business days for your refill to be completed.          Additional Information About Your Visit        MyChart Information     [employer] lets you send messages to your doctor, view your test results, renew your prescriptions, schedule appointments and more. To sign up, go to [email or web address] . Click on \"Log in\" on the left side of the screen, which will take you to the Welcome page. Then click on \"Sign up Now\" on the right side of the page.     You will be asked to enter the access code listed below, as well as some personal information. Please follow the directions to create your username and password.     Your " "access code is: [email or web address]  Expires: 2018 11:42 AM     Your access code will  in 90 days. If you need help or a new code, please call your [facility] or [phone].        Care EveryWhere ID     This is your Care EveryWhere ID. This could be used by other organizations to access your [place] medical records  [id number]        Your Vitals Were     Pulse Height BMI (Body Mass Index)             80 1.753 m (5' 9\") 31.75 kg/m2          Blood Pressure from Last 3 Encounters:   18 124/66   18 138/88   18 (!) 138/109    Weight from Last 3 Encounters:   18 97.5 kg (215 lb)   18 97.5 kg (215 lb)   03/15/18 97.5 kg (215 lb)              Today, you had the following     No orders found for display       Primary Care Provider Office Phone # Fax #    Jovan [facility] [phone]       [address] [place] [address]        Equal Access to Services     DESI VITALE : Hadii samira swann [place], waaxda luqadaha, qaybta kaalmada dayan, alejandro murray . So [facility] [phone].    ATENCIÓN: Si habla español, tiene a de leon disposición servicios gratuitos de asistencia lingüística. [facility] [phone].    We comply with applicable federal civil rights laws and [place] laws. We do not discriminate on the basis of race, color, national origin, age, disability, sex, sexual orientation, or gender identity.            Thank you!     Thank you for choosing [facility]OLOGY CLINIC [place]  for your care. Our goal is always to provide you with excellent care. Hearing back from our patients is one way we can continue to improve our services. Please take a few minutes to complete the written survey that you may receive in the mail after your visit with us. Thank you!             Your Updated Medication List - Protect others around you: Learn how to safely use, store and throw away your medicines at " www.disposemymeds.org.          This list is accurate as of 3/26/18 11:13 AM.  Always use your most recent med list.                   Brand Name Dispense Instructions for use Diagnosis    cod liver oil Caps capsule      Take 1 capsule by mouth        D3-50 94906 UNITS capsule   Generic drug:  cholecalciferol      Take 50,000 Units by mouth        MULTI-VITAMINS Tabs      Take 1 tablet by mouth        oxybutynin 5 MG 24 hr tablet    DITROPAN XL    7 tablet    Take 1 tablet (5 mg) by mouth daily    Benign prostatic hyperplasia with urinary retention       sennosides 8.6 MG tablet    SENOKOT    20 tablet    Take 1-3 tablets by mouth daily as needed for constipation    Constipation, unspecified constipation type       tamsulosin 0.4 MG capsule    FLOMAX    30 capsule    Take 1 capsule (0.4 mg) by mouth daily    Benign prostatic hyperplasia with urinary retention

## 2018-03-26 NOTE — PROGRESS NOTES
"CC: Urinary retention.    HPI: It is a pleasure to see . Sam Barry, a 73 year old male seen today in the urology clinic in follow up of urinary retention. This developed acutely requiring Cardenas catheter placement on 3/15/18. The patient is tolerating the catheter without too much issue, but some bladder spasms. No clots of hematuria noted.    The patient has no prior history of AUR or similar symptoms. At baseline, patient does note some weak stream, dribbling.  Was on Flomax in 2015 and stopped this because he did not want to be on medications. Currently denies fevers, chills, N/V, abdominal/back pain.    Returns today for TOV. On Flomax x 7 days.     No past medical history on file.  No past surgical history on file.  Current Outpatient Prescriptions   Medication Sig Dispense Refill     cholecalciferol (D3-50) 62130 UNITS capsule Take 50,000 Units by mouth       cod liver oil CAPS capsule Take 1 capsule by mouth       Multiple Vitamin (MULTI-VITAMINS) TABS Take 1 tablet by mouth       tamsulosin (FLOMAX) 0.4 MG capsule Take 1 capsule (0.4 mg) by mouth daily 30 capsule 1     sennosides (SENOKOT) 8.6 MG tablet Take 1-3 tablets by mouth daily as needed for constipation 20 tablet 0     oxybutynin (DITROPAN XL) 5 MG 24 hr tablet Take 1 tablet (5 mg) by mouth daily 7 tablet 0     No Known Allergies  Family History: There is no h/o  malignancy.  There is no h/o urolithiasis.     Social History: The patient does not smoke cigarettes.  Denies EtOH and illicit drug use.      ROS: A comprehensive 14 point ROS was obtained and was  otherwise negative except for that outlined above in the HPI.    PHYSICAL EXAM:   /66 (BP Location: Right arm)  Pulse 80  Ht 1.753 m (5' 9\")  Wt 97.5 kg (215 lb)  BMI 31.75 kg/m2    GENERAL: Well groomed/well developed/well nourished male in NAD.  HEENT: EOMI, AT, NC.  SKIN: Warm to touch, dry.  No visible rashes or lesions.  RESP: No increased respiratory effort.  LYMPH: No " LE edema.  ABD: Soft, NT, ND.  No CVAT.  MS: Full ROM in extremities.  : Cardenas catheter indwelling draining yellow urine.  NEURO: Alert and oriented x 3.  PSYCH: Normal mood and affect, pleasant and agreeable during interview and exam.    REVIEW OF OUTSIDE RECORDS: 5 minutes spent reviewing previous/outside records.    TOV: 200cc in, 200cc out.    ASSESSMENT/PLAN:  73 year old male who developed acute urinary retention requiring Cardenas catheter placement. Has not been taking tamsulosin daily.   -Continue Flomax. Bowel regimen.  -RTO in 1 month for AUA and PVR.   -If he wishes to be off medications, will need cysto to determine if he is a surgical candidate. Will discuss further in follow up.     Barbara Varghese PA-C  [facility]rology    20 min spent with the patient, >50% of this time was spent in a face-to-face manner and on coordination of care of urinary retention.

## 2018-03-26 NOTE — LETTER
3/26/2018       RE: Sam Barry  [address] [address]     Dear Colleague,    Thank you for referring your patient, Sam Barry, to the [facility] [place] at [facility]. Please see a copy of my visit note below.    CC: Urinary retention.    HPI: It is a pleasure to see . Sam Barry, a 73 year old male seen today in the urology clinic in follow up of urinary retention. This developed acutely requiring Cardenas catheter placement on 3/15/18. The patient is tolerating the catheter without too much issue, but some bladder spasms. No clots of hematuria noted.    The patient has no prior history of AUR or similar symptoms. At baseline, patient does note some weak stream, dribbling.  Was on Flomax in 2015 and stopped this because he did not want to be on medications. Currently denies fevers, chills, N/V, abdominal/back pain.    Returns today for TOV. On Flomax x 7 days.     No past medical history on file.  No past surgical history on file.  Current Outpatient Prescriptions   Medication Sig Dispense Refill     cholecalciferol (D3-50) 77933 UNITS capsule Take 50,000 Units by mouth       cod liver oil CAPS capsule Take 1 capsule by mouth       Multiple Vitamin (MULTI-VITAMINS) TABS Take 1 tablet by mouth       tamsulosin (FLOMAX) 0.4 MG capsule Take 1 capsule (0.4 mg) by mouth daily 30 capsule 1     sennosides (SENOKOT) 8.6 MG tablet Take 1-3 tablets by mouth daily as needed for constipation 20 tablet 0     oxybutynin (DITROPAN XL) 5 MG 24 hr tablet Take 1 tablet (5 mg) by mouth daily 7 tablet 0     No Known Allergies  Family History: There is no h/o  malignancy.  There is no h/o urolithiasis.     Social History: The patient does not smoke cigarettes.  Denies EtOH and illicit drug use.      ROS: A comprehensive 14 point ROS was obtained and was  otherwise negative except for that outlined above in the  "HPI.    PHYSICAL EXAM:   /66 (BP Location: Right arm)  Pulse 80  Ht 1.753 m (5' 9\")  Wt 97.5 kg (215 lb)  BMI 31.75 kg/m2    GENERAL: Well groomed/well developed/well nourished male in NAD.  HEENT: EOMI, AT, NC.  SKIN: Warm to touch, dry.  No visible rashes or lesions.  RESP: No increased respiratory effort.  LYMPH: No LE edema.  ABD: Soft, NT, ND.  No CVAT.  MS: Full ROM in extremities.  : Cardenas catheter indwelling draining yellow urine.  NEURO: Alert and oriented x 3.  PSYCH: Normal mood and affect, pleasant and agreeable during interview and exam.    REVIEW OF OUTSIDE RECORDS: 5 minutes spent reviewing previous/outside records.    TOV: 200cc in, 200cc out.    ASSESSMENT/PLAN:  73 year old male who developed acute urinary retention requiring Cardenas catheter placement. Has not been taking tamsulosin daily.   -Continue Flomax. Bowel regimen.  -RTO in 1 month for AUA and PVR.   -If he wishes to be off medications, will need cysto to determine if he is a surgical candidate. Will discuss further in follow up.     20 min spent with the patient, >50% of this time was spent in a face-to-face manner and on coordination of care of urinary retention.       Again, thank you for allowing me to participate in the care of your patient.      Sincerely,    Barbara Varghese PA-C, LARRY      "

## 2018-04-30 ENCOUNTER — OFFICE VISIT (OUTPATIENT)
Dept: UROLOGY | Facility: CLINIC | Age: 73
End: 2018-04-30
Payer: COMMERCIAL

## 2018-04-30 VITALS
WEIGHT: 215 LBS | HEART RATE: 88 BPM | OXYGEN SATURATION: 96 % | DIASTOLIC BLOOD PRESSURE: 88 MMHG | HEIGHT: 69 IN | SYSTOLIC BLOOD PRESSURE: 138 MMHG | BODY MASS INDEX: 31.84 KG/M2

## 2018-04-30 DIAGNOSIS — R33.9 URINARY RETENTION: Primary | ICD-10-CM

## 2018-04-30 LAB
ALBUMIN UR-MCNC: NEGATIVE MG/DL
AMORPH CRY #/AREA URNS HPF: ABNORMAL /HPF
APPEARANCE UR: CLEAR
BILIRUB UR QL STRIP: NEGATIVE
COLOR UR AUTO: YELLOW
GLUCOSE UR STRIP-MCNC: NEGATIVE MG/DL
HGB UR QL STRIP: ABNORMAL
KETONES UR STRIP-MCNC: NEGATIVE MG/DL
LEUKOCYTE ESTERASE UR QL STRIP: NEGATIVE
MUCOUS THREADS #/AREA URNS LPF: PRESENT /LPF
NITRATE UR QL: NEGATIVE
PH UR STRIP: 5 PH (ref 5–7)
PSA SERPL-MCNC: 3.5 NG/ML (ref 0–4)
RBC #/AREA URNS AUTO: 0 /HPF (ref 0–2)
RESIDUAL VOLUME (RV) (EXTERNAL): 22
SOURCE: ABNORMAL
SP GR UR STRIP: >1.03 (ref 1–1.03)
UROBILINOGEN UR STRIP-ACNC: 0.2 EU/DL (ref 0.2–1)
WBC #/AREA URNS AUTO: 0 /HPF (ref 0–5)

## 2018-04-30 PROCEDURE — 99213 OFFICE O/P EST LOW 20 MIN: CPT | Mod: 25 | Performed by: PHYSICIAN ASSISTANT

## 2018-04-30 PROCEDURE — 51798 US URINE CAPACITY MEASURE: CPT | Performed by: PHYSICIAN ASSISTANT

## 2018-04-30 PROCEDURE — G0103 PSA SCREENING: HCPCS | Performed by: PHYSICIAN ASSISTANT

## 2018-04-30 PROCEDURE — 36415 COLL VENOUS BLD VENIPUNCTURE: CPT | Performed by: PHYSICIAN ASSISTANT

## 2018-04-30 PROCEDURE — 81003 URINALYSIS AUTO W/O SCOPE: CPT | Performed by: PHYSICIAN ASSISTANT

## 2018-04-30 PROCEDURE — 81015 MICROSCOPIC EXAM OF URINE: CPT | Performed by: PHYSICIAN ASSISTANT

## 2018-04-30 RX ORDER — TAMSULOSIN HYDROCHLORIDE 0.4 MG/1
0.4 CAPSULE ORAL DAILY
Qty: 90 CAPSULE | Refills: 3 | Status: SHIPPED | OUTPATIENT
Start: 2018-04-30 | End: 2018-09-17

## 2018-04-30 ASSESSMENT — PAIN SCALES - GENERAL: PAINLEVEL: NO PAIN (0)

## 2018-04-30 NOTE — MR AVS SNAPSHOT
"              After Visit Summary   4/30/2018    Sam Barry    MRN: [id number]           Patient Information     Date Of Birth          1945        Visit Information        Provider Department      4/30/2018 10:30 AM Barbara Varghese PA-C [facility] [place]        Today's Diagnoses     Urinary retention    -  1      Care Instructions    PSA results tomorrow    See you in 3 months    Flomax was refilled.           Follow-ups after your visit        Follow-up notes from your care team     Return in about 3 months (around 7/30/2018).      Who to contact     If you have questions or need follow up information about today's clinic visit or your schedule please contact [facility] [place] directly at [phone].  Normal or non-critical lab and imaging results will be communicated to you by [employer] System[employer], letter or phone within 4 business days after the clinic has received the results. If you do not hear from us within 7 days, please contact the clinic through [employer] System[employer] or phone. If you have a critical or abnormal lab result, we will notify you by phone as soon as possible.  Submit refill requests through [employer] or call your pharmacy and they will forward the refill request to us. Please allow 3 business days for your refill to be completed.          Additional Information About Your Visit        [employer] System[employer]art Information     [employer] lets you send messages to your doctor, view your test results, renew your prescriptions, schedule appointments and more. To sign up, go to [email or web address] . Click on \"Log in\" on the left side of the screen, which will take you to the Welcome page. Then click on \"Sign up Now\" on the right side of the page.     You will be asked to enter the access code listed below, as well as some personal information. Please follow the directions to create your username and password.     Your access code is: [email or web address]  Expires: " "2018 11:42 AM     Your access code will  in 90 days. If you need help or a new code, please call your [place] clinic or [phone].        Care EveryWhere ID     This is your Care EveryWhere ID. This could be used by other organizations to access your [place] medical records  [id number]        Your Vitals Were     Pulse Height Pulse Oximetry BMI (Body Mass Index)          88 1.753 m (5' 9\") 96% 31.75 kg/m2         Blood Pressure from Last 3 Encounters:   18 138/88   18 124/66   18 138/88    Weight from Last 3 Encounters:   18 97.5 kg (215 lb)   18 97.5 kg (215 lb)   18 97.5 kg (215 lb)              We Performed the Following     MEASURE POST-VOID RESIDUAL URINE/BLADDER CAPACITY, US NON-IMAGING (40747)     PSA Screen Urologic Phys [OMQ6103]     UA without Microscopic     Urine Micro Urologic Phys          Today's Medication Changes          These changes are accurate as of 18 10:56 AM.  If you have any questions, ask your nurse or doctor.               These medicines have changed or have updated prescriptions.        Dose/Directions    * tamsulosin 0.4 MG capsule   Commonly known as:  FLOMAX   This may have changed:  Another medication with the same name was added. Make sure you understand how and when to take each.   Used for:  Benign prostatic hyperplasia with urinary retention        Dose:  0.4 mg   Take 1 capsule (0.4 mg) by mouth daily   Quantity:  30 capsule   Refills:  1       * tamsulosin 0.4 MG capsule   Commonly known as:  FLOMAX   This may have changed:  You were already taking a medication with the same name, and this prescription was added. Make sure you understand how and when to take each.   Used for:  Urinary retention        Dose:  0.4 mg   Take 1 capsule (0.4 mg) by mouth daily   Quantity:  90 capsule   Refills:  3       * Notice:  This list has 2 medication(s) that are the same as other medications prescribed for you. Read the directions " carefully, and ask your doctor or other care provider to review them with you.         Where to get your medicines      These medications were sent to [facility] [place] - [place] - [address]  [address], [place] [address]     Phone:  [phone]     tamsulosin 0.4 MG capsule                Primary Care Provider Office Phone # Fax #    [facility] [phone]       [address] [place] [id number]        Equal Access to Services     RAI VITALE : Hadii aad ku hadasho Soomaali, waaxda luqadaha, qaybta kaalmada adeegyada, waxay idiin hayaan adeeg bobby murray . So [facility] [phone].    ATENCIÓN: Si habla español, tiene a de leon disposición servicios gratuitos de asistencia lingüística. Aida al [phone].    We comply with applicable federal civil rights laws and [place] laws. We do not discriminate on the basis of race, color, national origin, age, disability, sex, sexual orientation, or gender identity.            Thank you!     Thank you for choosing [facility] [place]  for your care. Our goal is always to provide you with excellent care. Hearing back from our patients is one way we can continue to improve our services. Please take a few minutes to complete the written survey that you may receive in the mail after your visit with us. Thank you!             Your Updated Medication List - Protect others around you: Learn how to safely use, store and throw away your medicines at www.disposemymeds.org.          This list is accurate as of 4/30/18 10:56 AM.  Always use your most recent med list.                   Brand Name Dispense Instructions for use Diagnosis    cod liver oil Caps capsule      Take 1 capsule by mouth        D3-50 12131 units capsule   Generic drug:  cholecalciferol      Take 50,000 Units by mouth        MULTI-VITAMINS Tabs      Take 1 tablet by mouth        oxybutynin 5 MG 24 hr tablet    DITROPAN  XL    7 tablet    Take 1 tablet (5 mg) by mouth daily    Benign prostatic hyperplasia with urinary retention       sennosides 8.6 MG tablet    SENOKOT    20 tablet    Take 1-3 tablets by mouth daily as needed for constipation    Constipation, unspecified constipation type       * tamsulosin 0.4 MG capsule    FLOMAX    30 capsule    Take 1 capsule (0.4 mg) by mouth daily    Benign prostatic hyperplasia with urinary retention       * tamsulosin 0.4 MG capsule    FLOMAX    90 capsule    Take 1 capsule (0.4 mg) by mouth daily    Urinary retention       * Notice:  This list has 2 medication(s) that are the same as other medications prescribed for you. Read the directions carefully, and ask your doctor or other care provider to review them with you.

## 2018-04-30 NOTE — LETTER
4/30/2018       RE: Sam Barry  [address] [address]     Dear Colleague,    Thank you for referring your patient, Sam Barry, to the [facility] [place] at [facility]. Please see a copy of my visit note below.    CC: Urinary retention.    HPI: It is a pleasure to see . Sam Barry, a pleasant 73 year old male seen today in the urology clinic in follow up of urinary retention. This developed acutely requiring Cardenas catheter placement on 3/15/18. Passed TOV 3/17/18.    The patient has no prior history of AUR or similar symptoms. At baseline, patient did note some weak stream, dribbling.  Was on Flomax in 2015 and stopped this because he did not want to be on medications. Currently denies fevers, chills, N/V, abdominal/back pain.    Back on Flomax since episode of retention (missed a few days and is having frequency).     AUA score: 1  QOL score: 1    No past medical history on file.  No past surgical history on file.  Current Outpatient Prescriptions   Medication Sig Dispense Refill     cholecalciferol (D3-50) 41416 UNITS capsule Take 50,000 Units by mouth       cod liver oil CAPS capsule Take 1 capsule by mouth       Multiple Vitamin (MULTI-VITAMINS) TABS Take 1 tablet by mouth       oxybutynin (DITROPAN XL) 5 MG 24 hr tablet Take 1 tablet (5 mg) by mouth daily 7 tablet 0     sennosides (SENOKOT) 8.6 MG tablet Take 1-3 tablets by mouth daily as needed for constipation 20 tablet 0     tamsulosin (FLOMAX) 0.4 MG capsule Take 1 capsule (0.4 mg) by mouth daily 30 capsule 1     No Known Allergies  Family History: There is no h/o  malignancy.  There is no h/o urolithiasis.     Social History: The patient does not smoke cigarettes.  Denies EtOH and illicit drug use.      ROS: A comprehensive 14 point ROS was obtained and was  otherwise negative except for that outlined above in the HPI.    PHYSICAL  "EXAM:   /88 (BP Location: Left arm, Patient Position: Sitting, Cuff Size: Adult Regular)  Pulse 88  Ht 1.753 m (5' 9\")  Wt 97.5 kg (215 lb)  SpO2 96%  BMI 31.75 kg/m2    GENERAL: Well groomed/well developed/well nourished male in NAD.  HEENT: EOMI, AT, NC.  SKIN: Warm to touch, dry.  No visible rashes or lesions.  RESP: No increased respiratory effort.  LYMPH: No LE edema.  ABD: Soft, NT, ND.  No CVAT.  MS: Full ROM in extremities.  : Cardenas catheter indwelling draining yellow urine.  NEURO: Alert and oriented x 3.  PSYCH: Normal mood and affect, pleasant and agreeable during interview and exam.    REVIEW OF OUTSIDE RECORDS: 5 minutes spent reviewing previous/outside records.    PVR: 22cc  Urine: Trace blood    ASSESSMENT/PLAN:  73 year old male who developed acute urinary retention requiring Cardenas catheter placement. Has not been taking tamsulosin daily.   -Continue Flomax. Bowel regimen.  - PSA  -RTO in 3 month for FATOU, AUA and PVR.   -If he wishes to be off medications, will need cysto to determine if he is a surgical candidate. Will discuss further in follow up.       20 min spent with the patient, >50% of this time was spent in a face-to-face manner and on coordination of care of urinary retention.       Again, thank you for allowing me to participate in the care of your patient.      Sincerely,    Barbara Varghese PA-C, [facility]      "

## 2018-04-30 NOTE — PROGRESS NOTES
"CC: Urinary retention.    HPI: It is a pleasure to see . Sam Barry, a pleasant 73 year old male seen today in the urology clinic in follow up of urinary retention. This developed acutely requiring Cardenas catheter placement on 3/15/18. Passed TOV 3/17/18.    The patient has no prior history of AUR or similar symptoms. At baseline, patient did note some weak stream, dribbling.  Was on Flomax in 2015 and stopped this because he did not want to be on medications. Currently denies fevers, chills, N/V, abdominal/back pain.    Back on Flomax since episode of retention (missed a few days and is having frequency).     AUA score: 1  QOL score: 1    No past medical history on file.  No past surgical history on file.  Current Outpatient Prescriptions   Medication Sig Dispense Refill     cholecalciferol (D3-50) 28650 UNITS capsule Take 50,000 Units by mouth       cod liver oil CAPS capsule Take 1 capsule by mouth       Multiple Vitamin (MULTI-VITAMINS) TABS Take 1 tablet by mouth       oxybutynin (DITROPAN XL) 5 MG 24 hr tablet Take 1 tablet (5 mg) by mouth daily 7 tablet 0     sennosides (SENOKOT) 8.6 MG tablet Take 1-3 tablets by mouth daily as needed for constipation 20 tablet 0     tamsulosin (FLOMAX) 0.4 MG capsule Take 1 capsule (0.4 mg) by mouth daily 30 capsule 1     No Known Allergies  Family History: There is no h/o  malignancy.  There is no h/o urolithiasis.     Social History: The patient does not smoke cigarettes.  Denies EtOH and illicit drug use.      ROS: A comprehensive 14 point ROS was obtained and was  otherwise negative except for that outlined above in the HPI.    PHYSICAL EXAM:   /88 (BP Location: Left arm, Patient Position: Sitting, Cuff Size: Adult Regular)  Pulse 88  Ht 1.753 m (5' 9\")  Wt 97.5 kg (215 lb)  SpO2 96%  BMI 31.75 kg/m2    GENERAL: Well groomed/well developed/well nourished male in NAD.  HEENT: EOMI, AT, NC.  SKIN: Warm to touch, dry.  No visible rashes or " lesions.  RESP: No increased respiratory effort.  LYMPH: No LE edema.  ABD: Soft, NT, ND.  No CVAT.  MS: Full ROM in extremities.  : Cardenas catheter indwelling draining yellow urine.  NEURO: Alert and oriented x 3.  PSYCH: Normal mood and affect, pleasant and agreeable during interview and exam.    REVIEW OF OUTSIDE RECORDS: 5 minutes spent reviewing previous/outside records.    PVR: 22cc  Urine: Trace blood    ASSESSMENT/PLAN:  73 year old male who developed acute urinary retention requiring Cardenas catheter placement. Has not been taking tamsulosin daily.   -Continue Flomax. Bowel regimen.  - PSA  -RTO in 3 month for FATOU, AUA and PVR.   -If he wishes to be off medications, will need cysto to determine if he is a surgical candidate. Will discuss further in follow up.     Barbara Varghese PA-C  [facility]    20 min spent with the patient, >50% of this time was spent in a face-to-face manner and on coordination of care of urinary retention.

## 2018-04-30 NOTE — NURSING NOTE
Chief Complaint   Patient presents with     Urinary Retention     Patient here today for 1 Month FU       Patient had PVR today  As well as AUASS done today

## 2018-07-07 ENCOUNTER — HOSPITAL ENCOUNTER (EMERGENCY)
Facility: CLINIC | Age: 73
Discharge: HOME | End: 2018-07-07
Attending: EMERGENCY MEDICINE | Admitting: EMERGENCY MEDICINE
Payer: COMMERCIAL

## 2018-07-07 VITALS
BODY MASS INDEX: 29.33 KG/M2 | OXYGEN SATURATION: 99 % | DIASTOLIC BLOOD PRESSURE: 86 MMHG | SYSTOLIC BLOOD PRESSURE: 165 MMHG | TEMPERATURE: 98.1 F | HEIGHT: 69 IN | HEART RATE: 89 BPM | WEIGHT: 198 LBS | RESPIRATION RATE: 18 BRPM

## 2018-07-07 DIAGNOSIS — R33.8 BENIGN PROSTATIC HYPERPLASIA WITH URINARY RETENTION: ICD-10-CM

## 2018-07-07 DIAGNOSIS — R33.9 URINARY RETENTION: Primary | ICD-10-CM

## 2018-07-07 DIAGNOSIS — N40.1 BENIGN PROSTATIC HYPERPLASIA WITH URINARY RETENTION: ICD-10-CM

## 2018-07-07 LAB
ALBUMIN UR-MCNC: NEGATIVE MG/DL
APPEARANCE UR: CLEAR
BILIRUB UR QL STRIP: NEGATIVE
COLOR UR AUTO: ABNORMAL
GLUCOSE UR STRIP-MCNC: NEGATIVE MG/DL
HGB UR QL STRIP: NEGATIVE
KETONES UR STRIP-MCNC: NEGATIVE MG/DL
LEUKOCYTE ESTERASE UR QL STRIP: NEGATIVE
MUCOUS THREADS #/AREA URNS LPF: PRESENT /LPF
NITRATE UR QL: NEGATIVE
PH UR STRIP: 5.5 PH (ref 5–7)
RBC #/AREA URNS AUTO: <1 /HPF (ref 0–2)
SOURCE: ABNORMAL
SP GR UR STRIP: 1.01 (ref 1–1.03)
UROBILINOGEN UR STRIP-MCNC: NORMAL MG/DL (ref 0–2)
WBC #/AREA URNS AUTO: <1 /HPF (ref 0–5)

## 2018-07-07 PROCEDURE — 99283 EMERGENCY DEPT VISIT LOW MDM: CPT

## 2018-07-07 PROCEDURE — 51702 INSERT TEMP BLADDER CATH: CPT

## 2018-07-07 PROCEDURE — 81001 URINALYSIS AUTO W/SCOPE: CPT | Performed by: EMERGENCY MEDICINE

## 2018-07-07 ASSESSMENT — ENCOUNTER SYMPTOMS
SHORTNESS OF BREATH: 0
DIFFICULTY URINATING: 1
FEVER: 0
BACK PAIN: 0

## 2018-07-07 NOTE — ED AVS SNAPSHOT
Emergency Department    [address] [facility] [address]    Phone:  [phone]    Fax:  [phone]                                       Sam Barry   MRN: [id number]    Department:   Emergency Department   Date of Visit:  7/7/2018           Patient Information     Date Of Birth          1945        Your diagnoses for this visit were:     Urinary retention     Benign prostatic hyperplasia with urinary retention        You were seen by Jean Pierre Campbell MD.      Follow-up Information     Schedule an appointment as soon as possible for a visit with Barbara Varghese PA-C.    Specialty:  Physician Assistant    Contact information:    [facility]  [address] [id number] [phone]          Follow up with  Emergency Department.    Specialty:  EMERGENCY MEDICINE    Why:  If symptoms worsen    Contact information:    [address] [facility] [id number] [phone]        Discharge Instructions         Urinary Retention (Male)  Urinary retention is the medical term for difficulty or inability to pass urine, even though your bladder is full.  Causes  The most common cause of urinary retention in men is the bladder outlet being blocked. This can be due to an enlarged prostate gland or a bladder infection. Certain medicines can also cause this problem. This condition is more likely to occur as men get older.    Symptoms  Common symptoms of urinary retention include:    Pain (not experienced by everyone)    Frequent urination    Feeling that the bladder is still full after urinating    Incontinence (not being able to control the release of urine)    Swollen abdomen  Treatment  This condition is treated by inserting a tube (catheter) into the bladder to drain the urine. This provides immediate relief. The catheter may need to stay in place for a few days. The catheter has a balloon on the tip, which is inflated after insertion. This prevents  the catheter from falling out.  Home care    If you were given antibiotics, take them until they are used up, or your healthcare provider tells you to stop. It is important to finish the antibiotics even though you feel better. This is to make sure your infection has cleared.    If a catheter was left in place, it is important to keep bacteria from getting into the collection bag. Don't disconnect the catheter from the collection bag.    Use a leg band to secure the drainage tube, so it does not pull on the catheter. Drain the collection bag when it becomes full using the drain spout at the bottom of the bag.    Don't pull on or try to remove your catheter. This will injure your urethra. The catheter must be removed by a healthcare provider.  Follow-up care  Follow up with your healthcare provider, or as advised.  If a catheter was left in place, it can usually be removed within 3 to 7 days. Some conditions require the catheter to stay in longer. Your healthcare provider will tell you when to return to have the catheter removed.  When to seek medical advice  Call your healthcare provider right away if any of these occur:    Fever of 100.4 F (38 C) or higher, or as directed by your healthcare provider    Bladder or lower-abdominal pain or fullness    Abdominal swelling, nausea, vomiting, or back pain    Blood or urine leakage around the catheter    Bloody urine coming from the catheter (if a new symptom)    Weakness, dizziness, or fainting    Confusion or change in usual level of alertness    If a catheter was left in place, return if:  ? Catheter falls out  ? Catheter stops draining for 6 hours  Date Last Reviewed: 10/1/2017    5037-1367 [employer]. [address] [place] [address], [place] [id number]. All rights reserved. This information is not intended as a substitute for professional medical care. Always follow your healthcare professional's instructions.          Cardenas Catheter Care    A Cardenas catheter is a  rubber tube that is placed through the urethra (opening where urine comes out) and into the bladder. This helps drain urine from the bladder. There is a small balloon on the end of the tube that is inflated after insertion. This keeps the catheter from sliding out of the bladder.  A Cardenas catheter is used to treat urinary retention (unable to pass urine). It is also used when there is incontinence (loss of bladder control).  Home care    Finish taking any prescribed antibiotic even if you are feeling better before then.    It is important to keep bacteria from getting into the collection bag. Do not disconnect the catheter from the collection bag.    Use a leg band to secure the drainage tube, so it does not pull on the catheter. Drain the collection bag when it becomes full using the drain spout at the bottom of the bag.    Do not try to pull or remove your catheter. This will injure your urethra. It must be removed by your healthcare provider or nurse.  Follow-up care  Follow up with your healthcare provider as advised for repeat urine testing and catheter removal or replacement.  When to seek medical advice  Call your healthcare provider right away if any of these occur:    Fever of 100.4 F (38 C) or higher, or as directed by your healthcare provider    Bladder pain or fullness    Abdominal swelling, nausea or vomiting, or back pain    Blood or urine leakage around the catheter    Bloody urine coming from the catheter (if a new symptom)    Catheter falls out    Catheter stops draining for 6 hours    Weakness, dizziness, or fainting  Date Last Reviewed: 10/1/2016    3458-7825 [employer]. [address] [place] [address]. All rights reserved. This information is not intended as a substitute for professional medical care. Always follow your healthcare professional's instructions.          24 Hour Appointment Hotline       To make an appointment at any [facility], call [phone]  ([phone]). If you don't have a family doctor or clinic, we will help you find one. [facility] (formerly [facility]) are conveniently located to serve the needs of you and your family.             Review of your medicines      Our records show that you are taking the medicines listed below. If these are incorrect, please call your family doctor or clinic.        Dose / Directions Last dose taken    cod liver oil Caps capsule   Dose:  1 capsule        Take 1 capsule by mouth   Refills:  0        D3-50 01083 units capsule   Dose:  06962 Units   Generic drug:  cholecalciferol        Take 50,000 Units by mouth   Refills:  0        MULTI-VITAMINS Tabs   Dose:  1 tablet        Take 1 tablet by mouth   Refills:  0        oxybutynin 5 MG 24 hr tablet   Commonly known as:  DITROPAN XL   Dose:  5 mg   Quantity:  7 tablet        Take 1 tablet (5 mg) by mouth daily   Refills:  0        sennosides 8.6 MG tablet   Commonly known as:  SENOKOT   Dose:  1-3 tablet   Quantity:  20 tablet        Take 1-3 tablets by mouth daily as needed for constipation   Refills:  0        * tamsulosin 0.4 MG capsule   Commonly known as:  FLOMAX   Dose:  0.4 mg   Quantity:  30 capsule        Take 1 capsule (0.4 mg) by mouth daily   Refills:  1        * tamsulosin 0.4 MG capsule   Commonly known as:  FLOMAX   Dose:  0.4 mg   Quantity:  90 capsule        Take 1 capsule (0.4 mg) by mouth daily   Refills:  3        * Notice:  This list has 2 medication(s) that are the same as other medications prescribed for you. Read the directions carefully, and ask your doctor or other care provider to review them with you.            Procedures and tests performed during your visit     UA with Microscopic      Orders Needing Specimen Collection     None      Pending Results     No orders found from 7/5/2018 to 7/8/2018.            Pending Culture Results     No orders found from 7/5/2018 to 7/8/2018.            Pending Results Instructions     If you had any lab results that  were not finalized at the time of your Discharge, you can call the ED Lab Result RN at [phone]. You will be contacted by this team for any positive Lab results or changes in treatment. The nurses are available 7 days a week from 10A to 6:30P.  You can leave a message 24 hours per day and they will return your call.        Test Results From Your Hospital Stay        7/7/2018  9:16 AM      Component Results     Component Value Ref Range & Units Status    Color Urine Light Yellow  Final    Appearance Urine Clear  Final    Glucose Urine Negative NEG^Negative mg/dL Final    Bilirubin Urine Negative NEG^Negative Final    Ketones Urine Negative NEG^Negative mg/dL Final    Specific Gravity Urine 1.007 1.003 - 1.035 Final    Blood Urine Negative NEG^Negative Final    pH Urine 5.5 5.0 - 7.0 pH Final    Protein Albumin Urine Negative NEG^Negative mg/dL Final    Urobilinogen mg/dL Normal 0.0 - 2.0 mg/dL Final    Nitrite Urine Negative NEG^Negative Final    Leukocyte Esterase Urine Negative NEG^Negative Final    Source Catheterized Urine  Final    WBC Urine <1 0 - 5 /HPF Final    RBC Urine <1 0 - 2 /HPF Final    Mucous Urine Present (A) NEG^Negative /LPF Final                Clinical Quality Measure: Blood Pressure Screening     Your blood pressure was checked while you were in the emergency department today. The last reading we obtained was  BP: (!) 183/135 . Please read the guidelines below about what these numbers mean and what you should do about them.  If your systolic blood pressure (the top number) is less than 120 and your diastolic blood pressure (the bottom number) is less than 80, then your blood pressure is normal. There is nothing more that you need to do about it.  If your systolic blood pressure (the top number) is 120-139 or your diastolic blood pressure (the bottom number) is 80-89, your blood pressure may be higher than it should be. You should have your blood pressure rechecked within a year by a primary  "care provider.  If your systolic blood pressure (the top number) is 140 or greater or your diastolic blood pressure (the bottom number) is 90 or greater, you may have high blood pressure. High blood pressure is treatable, but if left untreated over time it can put you at risk for heart attack, stroke, or kidney failure. You should have your blood pressure rechecked by a primary care provider within the next 4 weeks.  If your provider in the emergency department today gave you specific instructions to follow-up with your doctor or provider even sooner than that, you should follow that instruction and not wait for up to 4 weeks for your follow-up visit.        Thank you for choosing [facility]       Thank you for choosing [facility] for your care. Our goal is always to provide you with excellent care. Hearing back from our patients is one way we can continue to improve our services. Please take a few minutes to complete the written survey that you may receive in the mail after you visit with us. Thank you!        [employer] Information     [employer] lets you send messages to your doctor, view your test results, renew your prescriptions, schedule appointments and more. To sign up, go to [email or web address] . Click on \"Log in\" on the left side of the screen, which will take you to the Welcome page. Then click on \"Sign up Now\" on the right side of the page.     You will be asked to enter the access code listed below, as well as some personal information. Please follow the directions to create your username and password.     Your access code is: [email or web address]  Expires: 10/5/2018  9:34 AM     Your access code will  in 90 days. If you need help or a new code, please call your [facility] clinic or [phone].        Care EveryWhere ID     This is your Care EveryWhere ID. This could be used by other organizations to access your [facility] medical records  [id number]        Equal Access to Services     RAI LAZCANO: Neha hogan " michaela Chapa, alyse cisneros, nilda hanley, alejandro arguello. So [facility] [phone].    ATENCIÓN: Si habla español, tiene a de leon disposición servicios gratuitos de asistencia lingüística. Llame al [phone].    We comply with applicable federal civil rights laws and [place] laws. We do not discriminate on the basis of race, color, national origin, age, disability, sex, sexual orientation, or gender identity.            After Visit Summary       This is your record. Keep this with you and show to your community pharmacist(s) and doctor(s) at your next visit.

## 2018-07-07 NOTE — ED PROVIDER NOTES
"  History     Chief Complaint:  Urinary Retention    HPI   Sam Barry is a 73 year old male who presents to the emergency department today for evaluation of urinary retention. The patient reports that since 0300 he has been unable to void his bladder. He reports this has happened once before and a Cardenas catheter was placed. He indicates that he last urinated sometime yesterday during the day. He does describe that for the past few weeks his stream has been more wide spread and less of a consistent stream. He denies any swelling in the legs, fever, chest pain, shortness of breath, or back pain.     Allergies:  No Known Drug Allergies     Medications:    Flomax    Past Medical History:    Urinary retention    Past Surgical History:    Surgical history reviewed. No pertinent surgical history.    Family History:    Family history reviewed. No pertinent family history.     Social History:  The patient was accompanied to the ED by his wife.  Smoking Status: Never Smoker  Smokeless Tobacco: Never Used  Alcohol Use: Negative  Marital Status:      Review of Systems   Constitutional: Negative for fever.   Respiratory: Negative for shortness of breath.    Cardiovascular: Negative for chest pain and leg swelling.   Genitourinary: Positive for decreased urine volume and difficulty urinating.   Musculoskeletal: Negative for back pain.   All other systems reviewed and are negative.      Physical Exam     Patient Vitals for the past 24 hrs:   BP Temp Temp src Pulse Resp SpO2 Height Weight   07/07/18 0844 (!) 183/135 98.1  F (36.7  C) Oral 100 18 98 % 1.753 m (5' 9\") 89.8 kg (198 lb)     Physical Exam  General: Alert, elderly, appears well-developed and well-nourished. Cooperative.     In no distress  HEENT:  Head:  Atraumatic  Ears:  External ears are normal  Mouth/Throat:  Oropharynx is without erythema or exudate and mucous membranes are moist.   Eyes:   Conjunctivae normal and EOM are normal. No scleral " icterus.  CV:  Normal rate, regular rhythm, normal heart sounds and radial pulses are 2+ and symmetric.  No murmur.  Resp:  Breath sounds are clear bilaterally    Non-labored, no retractions or accessory muscle use  GI:  Abdomen is soft, mild distension, no tenderness. No rebound or guarding. No CVA tenderness bilaterally  MS:  Normal range of motion. No edema.    Normal strength in all 4 extremities.     Back atraumatic.    No midline cervical, thoracic, or lumbar tenderness  Skin:  Warm and dry.  No rash or lesions noted.  Neuro: Alert. Normal strength.GCS: 15  Psych:  Normal mood and affect.    Emergency Department Course   Laboratory:  Laboratory findings were communicated with the patient who voiced understanding of the findings.    UA: Mucous Present (A)    Emergency Department Course:    0839 Nursing notes and vitals reviewed.    0845 I performed an exam of the patient as documented above.     0931 I personally reviewed the laboratory results with the patient and answered all related questions prior to discharge.    Impression & Plan      Medical Decision Making:  Sam Barry is a 73 year old male who presents to the emergency department today for evaluation of decreased urinary output.  I considered a broad differential including diverticulitis, aneurysm, urinary retention, ureterolithiasis, UTI, pyelonephritis, neurologic causes (MS, cauda equina,etc), colitis, etc.  The history and exam are consistent with acute urinary retention and this is confirmed after clark catheter placement and output of 300cc. Urinalysis showed no evidence of urinary tract infection. Will send home with catheter and have patient followup with urology in 3-5 days.  The cause of the acute urinary retention is unclear at this point but most likely related to BPH.  Considered that this may be caused by opiate medication, other medications, constipation, prostate issues, bladder or urethral tumor, ureterolithaisis, etc.  Already  on flomax and encouraged to continue its use.  Patient is stable for discharge home.      Diagnosis:    ICD-10-CM    1. Urinary retention R33.9 UA with Microscopic   2. Benign prostatic hyperplasia with urinary retention N40.1     R33.8      Disposition:   The patient is discharged to home.    Discharge Medications:  No discharge medications.    Scribe Disclosure:  Casandra MCCRACKEN, am serving as a scribe at 8:42 AM on 7/7/2018 to document services personally performed by Jean Pierre Campbell MD based on my observations and the provider's statements to me.     EMERGENCY DEPARTMENT       Jean Pierre Campbell MD  07/07/18 0960

## 2018-07-07 NOTE — ED AVS SNAPSHOT
Emergency Department    [address] [place] [address]    Phone:  [phone]    Fax:  [phone]                                       Sam Barry   MRN: [id number]    Department:   Emergency Department   Date of Visit:  7/7/2018           After Visit Summary Signature Page     I have received my discharge instructions, and my questions have been answered. I have discussed any challenges I see with this plan with the nurse or doctor.    ..........................................................................................................................................  Patient/Patient Representative Signature      ..........................................................................................................................................  Patient Representative Print Name and Relationship to Patient    ..................................................               ................................................  Date                                            Time    ..........................................................................................................................................  Reviewed by Signature/Title    ...................................................              ..............................................  Date                                                            Time

## 2018-07-07 NOTE — DISCHARGE INSTRUCTIONS
Urinary Retention (Male)  Urinary retention is the medical term for difficulty or inability to pass urine, even though your bladder is full.  Causes  The most common cause of urinary retention in men is the bladder outlet being blocked. This can be due to an enlarged prostate gland or a bladder infection. Certain medicines can also cause this problem. This condition is more likely to occur as men get older.    Symptoms  Common symptoms of urinary retention include:    Pain (not experienced by everyone)    Frequent urination    Feeling that the bladder is still full after urinating    Incontinence (not being able to control the release of urine)    Swollen abdomen  Treatment  This condition is treated by inserting a tube (catheter) into the bladder to drain the urine. This provides immediate relief. The catheter may need to stay in place for a few days. The catheter has a balloon on the tip, which is inflated after insertion. This prevents the catheter from falling out.  Home care    If you were given antibiotics, take them until they are used up, or your healthcare provider tells you to stop. It is important to finish the antibiotics even though you feel better. This is to make sure your infection has cleared.    If a catheter was left in place, it is important to keep bacteria from getting into the collection bag. Don't disconnect the catheter from the collection bag.    Use a leg band to secure the drainage tube, so it does not pull on the catheter. Drain the collection bag when it becomes full using the drain spout at the bottom of the bag.    Don't pull on or try to remove your catheter. This will injure your urethra. The catheter must be removed by a healthcare provider.  Follow-up care  Follow up with your healthcare provider, or as advised.  If a catheter was left in place, it can usually be removed within 3 to 7 days. Some conditions require the catheter to stay in longer. Your healthcare provider will  tell you when to return to have the catheter removed.  When to seek medical advice  Call your healthcare provider right away if any of these occur:    Fever of 100.4 F (38 C) or higher, or as directed by your healthcare provider    Bladder or lower-abdominal pain or fullness    Abdominal swelling, nausea, vomiting, or back pain    Blood or urine leakage around the catheter    Bloody urine coming from the catheter (if a new symptom)    Weakness, dizziness, or fainting    Confusion or change in usual level of alertness    If a catheter was left in place, return if:  ? Catheter falls out  ? Catheter stops draining for 6 hours  Date Last Reviewed: 10/1/2017    8648-0960 [employer]. [address] [place] [address], [place] [address]. All rights reserved. This information is not intended as a substitute for professional medical care. Always follow your healthcare professional's instructions.          Cardenas Catheter Care    A Cardenas catheter is a rubber tube that is placed through the urethra (opening where urine comes out) and into the bladder. This helps drain urine from the bladder. There is a small balloon on the end of the tube that is inflated after insertion. This keeps the catheter from sliding out of the bladder.  A Cardenas catheter is used to treat urinary retention (unable to pass urine). It is also used when there is incontinence (loss of bladder control).  Home care    Finish taking any prescribed antibiotic even if you are feeling better before then.    It is important to keep bacteria from getting into the collection bag. Do not disconnect the catheter from the collection bag.    Use a leg band to secure the drainage tube, so it does not pull on the catheter. Drain the collection bag when it becomes full using the drain spout at the bottom of the bag.    Do not try to pull or remove your catheter. This will injure your urethra. It must be removed by your healthcare provider or nurse.  Follow-up  care  Follow up with your healthcare provider as advised for repeat urine testing and catheter removal or replacement.  When to seek medical advice  Call your healthcare provider right away if any of these occur:    Fever of 100.4 F (38 C) or higher, or as directed by your healthcare provider    Bladder pain or fullness    Abdominal swelling, nausea or vomiting, or back pain    Blood or urine leakage around the catheter    Bloody urine coming from the catheter (if a new symptom)    Catheter falls out    Catheter stops draining for 6 hours    Weakness, dizziness, or fainting  Date Last Reviewed: 10/1/2016    7194-0160 [employer]. [address] [place] [address]. All rights reserved. This information is not intended as a substitute for professional medical care. Always follow your healthcare professional's instructions.

## 2018-07-11 ENCOUNTER — HOSPITAL ENCOUNTER (EMERGENCY)
Facility: CLINIC | Age: 73
Discharge: HOME | End: 2018-07-11
Attending: EMERGENCY MEDICINE | Admitting: EMERGENCY MEDICINE
Payer: COMMERCIAL

## 2018-07-11 VITALS
WEIGHT: 180 LBS | TEMPERATURE: 98.7 F | HEIGHT: 69 IN | SYSTOLIC BLOOD PRESSURE: 123 MMHG | BODY MASS INDEX: 26.66 KG/M2 | HEART RATE: 93 BPM | OXYGEN SATURATION: 94 % | DIASTOLIC BLOOD PRESSURE: 86 MMHG | RESPIRATION RATE: 18 BRPM

## 2018-07-11 DIAGNOSIS — N30.01 ACUTE CYSTITIS WITH HEMATURIA: ICD-10-CM

## 2018-07-11 LAB
ALBUMIN UR-MCNC: 100 MG/DL
APPEARANCE UR: ABNORMAL
BACTERIA #/AREA URNS HPF: ABNORMAL /HPF
BILIRUB UR QL STRIP: NEGATIVE
COLOR UR AUTO: YELLOW
GLUCOSE UR STRIP-MCNC: NEGATIVE MG/DL
HGB UR QL STRIP: ABNORMAL
KETONES UR STRIP-MCNC: NEGATIVE MG/DL
LEUKOCYTE ESTERASE UR QL STRIP: ABNORMAL
MUCOUS THREADS #/AREA URNS LPF: PRESENT /LPF
NITRATE UR QL: POSITIVE
PH UR STRIP: 5.5 PH (ref 5–7)
RBC #/AREA URNS AUTO: >182 /HPF (ref 0–2)
SOURCE: ABNORMAL
SP GR UR STRIP: 1.02 (ref 1–1.03)
UROBILINOGEN UR STRIP-MCNC: NORMAL MG/DL (ref 0–2)
WBC #/AREA URNS AUTO: 145 /HPF (ref 0–5)

## 2018-07-11 PROCEDURE — 87186 SC STD MICRODIL/AGAR DIL: CPT | Performed by: EMERGENCY MEDICINE

## 2018-07-11 PROCEDURE — 87088 URINE BACTERIA CULTURE: CPT | Performed by: EMERGENCY MEDICINE

## 2018-07-11 PROCEDURE — 99283 EMERGENCY DEPT VISIT LOW MDM: CPT

## 2018-07-11 PROCEDURE — 25000132 ZZH RX MED GY IP 250 OP 250 PS 637: Performed by: EMERGENCY MEDICINE

## 2018-07-11 PROCEDURE — 87086 URINE CULTURE/COLONY COUNT: CPT | Performed by: EMERGENCY MEDICINE

## 2018-07-11 PROCEDURE — 81001 URINALYSIS AUTO W/SCOPE: CPT | Performed by: EMERGENCY MEDICINE

## 2018-07-11 RX ORDER — CIPROFLOXACIN 500 MG/1
500 TABLET, FILM COATED ORAL ONCE
Status: COMPLETED | OUTPATIENT
Start: 2018-07-11 | End: 2018-07-11

## 2018-07-11 RX ORDER — CIPROFLOXACIN 500 MG/1
500 TABLET, FILM COATED ORAL 2 TIMES DAILY
Qty: 14 TABLET | Refills: 0 | Status: SHIPPED | OUTPATIENT
Start: 2018-07-11 | End: 2018-07-18

## 2018-07-11 RX ADMIN — CIPROFLOXACIN HYDROCHLORIDE 500 MG: 500 TABLET, FILM COATED ORAL at 11:25

## 2018-07-11 ASSESSMENT — ENCOUNTER SYMPTOMS
FEVER: 0
ABDOMINAL PAIN: 0
SHORTNESS OF BREATH: 0
BACK PAIN: 0
NAUSEA: 0
DYSURIA: 1

## 2018-07-11 NOTE — DISCHARGE INSTRUCTIONS
Discharge Instructions  Urinary Tract Infection  You have urinary tract infection, or UTI. The urinary tract includes the kidneys (which make urine), ureters (the tubes that carry urine from the kidneys to the bladder), the bladder (which stores urine), and urethra (the tube that carries urine out of the bladder).  Urinary tract infections occur when bacteria travel up the urethra into the bladder. We suspect a UTI based on chemical and microscopic findings in your urine, but if there is a question about your findings, we will do a culture to see if bacteria grow. A urine culture takes several days. You should always follow-up with your primary physician to find out about results of your culture if one was done.   Return to the Emergency Department if:    You have severe back pain.    You are vomiting so that you can t take your medicine, or have signs of dehydration (such as urinating less than 3 times per day).    You have fever over 101.5 degrees F.    You have significant confusion or are very weak, or feel very ill.    Your child seems much more ill, won t wake up, won t respond right, or is crying for a long time and won t calm down.    Your child is showing signs of dehydration, Signs of dehydration can be:  o Your infant has had no wet diapers in 4-5 hours.  o Your older child has not passed urine in 6-8 hours.  o Your infant or child starts to have dry mouth and lips, or no saliva or tears.    Follow-up with your doctor:     Children under 24 months need to be seen by their regular doctor within one week after a diagnosis of a UTI. It may be necessary to do some imaging tests to look at the child s kidney or bladder.    You should begin to feel better within 24 - 48 hours of starting your antibiotic.  If you do not, you need to be seen again.      Treatment:     You will be treated with an antibiotic to kill the bacteria. We have to make an educated guess as to which antibiotic will work for your infection.  "In most healthy people, we can guess right almost all of the time. Sometimes a culture is done to show which antibiotics will work. This usually takes 2-3 days. When the culture is done, we may have to contact you to put you on a different antibiotic.    Take a pain medication such as Tylenol  (acetaminophen), Advil  (ibuprofen), Nuprin  (ibuprofen), or Aleve  (naproxen). If you have been given a narcotic such as Vicodin  (hydrocodone with acetaminophen), Percocet  (oxycodone with acetaminophen), or codeine, do not drive for four hours after you have taken it. If the narcotic contains Tylenol  (acetaminophen), do not take Tylenol  with it. All narcotics will cause constipation, so eat a high fiber diet.      Pyridium  (phenazopyridine) or Uristat  (phenazopyridine) is a prescription medication that numbs the bladder to reduce the burning pain of some UTIs.  The same medication is available in a non-prescription version called Azo-Standard  (phenazopyridine), Urodol  (phenazopyridine), or other brand names. This medication will change the color of the urine and tears (usually blue or orange). If you wear contacts, do not wear them while taking this medication as they may be stained by the medication.    Antibiotic Warning:     If you have been placed on antibiotics - watch for signs of allergic reaction.  These include rash, lip swelling, difficulty breathing, wheezing, and dizziness.  If you develop any of these symptoms, stop the antibiotic immediately and go to an emergency room or urgent care for evaluation.    Probiotics: If you have been given an antibiotic, you may want to also take a probiotic pill or eat yogurt with live cultures. Probiotics have \"good bacteria\" to help your intestines stay healthy. Studies have shown that probiotics help prevent diarrhea and other intestine problems (including C. diff infection) when you take antibiotics. You can buy these without a prescription in the pharmacy section of " the store.   If you were given a prescription for medicine here today, be sure to read all of the information (including the package insert) that comes with your prescription.  This will include important information about the medicine, its side effects, and any warnings that you need to know about.  The pharmacist who fills the prescription can provide more information and answer questions you may have about the medicine.  If you have questions or concerns that the pharmacist cannot address, please call or return to the Emergency Department.       Remember that you can always come back to the Emergency Department if you are not able to see your regular doctor in the amount of time listed above, if you get any new symptoms, or if there is anything that worries you.

## 2018-07-11 NOTE — ED AVS SNAPSHOT
Emergency Department    [address] [facility] [address]    Phone:  [phone]    Fax:  [phone]                                       Sam Barry   MRN: [id number]    Department:   Emergency Department   Date of Visit:  7/11/2018           Patient Information     Date Of Birth          1945        Your diagnoses for this visit were:     Acute cystitis with hematuria        You were seen by Francesca Junior MD.      Follow-up Information     Follow up with Lan, Jovan Harrison. Schedule an appointment as soon as possible for a visit in 2 days.    Contact information:    [address] [address] [phone]          Follow up with  Emergency Department.    Specialty:  EMERGENCY MEDICINE    Why:  As needed, If symptoms worsen    Contact information:    [address] [facility] [id number] [phone]        Discharge Instructions       Discharge Instructions  Urinary Tract Infection  You have urinary tract infection, or UTI. The urinary tract includes the kidneys (which make urine), ureters (the tubes that carry urine from the kidneys to the bladder), the bladder (which stores urine), and urethra (the tube that carries urine out of the bladder).  Urinary tract infections occur when bacteria travel up the urethra into the bladder. We suspect a UTI based on chemical and microscopic findings in your urine, but if there is a question about your findings, we will do a culture to see if bacteria grow. A urine culture takes several days. You should always follow-up with your primary physician to find out about results of your culture if one was done.   Return to the Emergency Department if:    You have severe back pain.    You are vomiting so that you can t take your medicine, or have signs of dehydration (such as urinating less than 3 times per day).    You have fever over 101.5 degrees F.    You have significant confusion or are very weak, or  feel very ill.    Your child seems much more ill, won t wake up, won t respond right, or is crying for a long time and won t calm down.    Your child is showing signs of dehydration, Signs of dehydration can be:  o Your infant has had no wet diapers in 4-5 hours.  o Your older child has not passed urine in 6-8 hours.  o Your infant or child starts to have dry mouth and lips, or no saliva or tears.    Follow-up with your doctor:     Children under 24 months need to be seen by their regular doctor within one week after a diagnosis of a UTI. It may be necessary to do some imaging tests to look at the child s kidney or bladder.    You should begin to feel better within 24 - 48 hours of starting your antibiotic.  If you do not, you need to be seen again.      Treatment:     You will be treated with an antibiotic to kill the bacteria. We have to make an educated guess as to which antibiotic will work for your infection. In most healthy people, we can guess right almost all of the time. Sometimes a culture is done to show which antibiotics will work. This usually takes 2-3 days. When the culture is done, we may have to contact you to put you on a different antibiotic.    Take a pain medication such as Tylenol  (acetaminophen), Advil  (ibuprofen), Nuprin  (ibuprofen), or Aleve  (naproxen). If you have been given a narcotic such as Vicodin  (hydrocodone with acetaminophen), Percocet  (oxycodone with acetaminophen), or codeine, do not drive for four hours after you have taken it. If the narcotic contains Tylenol  (acetaminophen), do not take Tylenol  with it. All narcotics will cause constipation, so eat a high fiber diet.      Pyridium  (phenazopyridine) or Uristat  (phenazopyridine) is a prescription medication that numbs the bladder to reduce the burning pain of some UTIs.  The same medication is available in a non-prescription version called Azo-Standard  (phenazopyridine), Urodol  (phenazopyridine), or other brand names.  "This medication will change the color of the urine and tears (usually blue or orange). If you wear contacts, do not wear them while taking this medication as they may be stained by the medication.    Antibiotic Warning:     If you have been placed on antibiotics - watch for signs of allergic reaction.  These include rash, lip swelling, difficulty breathing, wheezing, and dizziness.  If you develop any of these symptoms, stop the antibiotic immediately and go to an emergency room or urgent care for evaluation.    Probiotics: If you have been given an antibiotic, you may want to also take a probiotic pill or eat yogurt with live cultures. Probiotics have \"good bacteria\" to help your intestines stay healthy. Studies have shown that probiotics help prevent diarrhea and other intestine problems (including C. diff infection) when you take antibiotics. You can buy these without a prescription in the pharmacy section of the store.   If you were given a prescription for medicine here today, be sure to read all of the information (including the package insert) that comes with your prescription.  This will include important information about the medicine, its side effects, and any warnings that you need to know about.  The pharmacist who fills the prescription can provide more information and answer questions you may have about the medicine.  If you have questions or concerns that the pharmacist cannot address, please call or return to the Emergency Department.       Remember that you can always come back to the Emergency Department if you are not able to see your regular doctor in the amount of time listed above, if you get any new symptoms, or if there is anything that worries you.        Your next 10 appointments already scheduled     Jul 16, 2018 11:30 AM CDT   Return Visit with Barbara Varghese PA-C   [facility] ([facility])    [address]  [address] [place] [address] " [phone]              24 Hour Appointment Hotline       To make an appointment at any [facility], call [phone] ([phone]). If you don't have a family doctor or clinic, we will help you find one. [facility] clinics are conveniently located to serve the needs of you and your family.             Review of your medicines      START taking        Dose / Directions Last dose taken    ciprofloxacin 500 MG tablet   Commonly known as:  CIPRO   Dose:  500 mg   Quantity:  14 tablet        Take 1 tablet (500 mg) by mouth 2 times daily for 7 days   Refills:  0          Our records show that you are taking the medicines listed below. If these are incorrect, please call your family doctor or clinic.        Dose / Directions Last dose taken    cod liver oil Caps capsule   Dose:  1 capsule        Take 1 capsule by mouth   Refills:  0        D3-50 68500 units capsule   Dose:  73111 Units   Generic drug:  cholecalciferol        Take 50,000 Units by mouth   Refills:  0        MULTI-VITAMINS Tabs   Dose:  1 tablet        Take 1 tablet by mouth   Refills:  0        oxybutynin 5 MG 24 hr tablet   Commonly known as:  DITROPAN XL   Dose:  5 mg   Quantity:  7 tablet        Take 1 tablet (5 mg) by mouth daily   Refills:  0        sennosides 8.6 MG tablet   Commonly known as:  SENOKOT   Dose:  1-3 tablet   Quantity:  20 tablet        Take 1-3 tablets by mouth daily as needed for constipation   Refills:  0        * tamsulosin 0.4 MG capsule   Commonly known as:  FLOMAX   Dose:  0.4 mg   Quantity:  30 capsule        Take 1 capsule (0.4 mg) by mouth daily   Refills:  1        * tamsulosin 0.4 MG capsule   Commonly known as:  FLOMAX   Dose:  0.4 mg   Quantity:  90 capsule        Take 1 capsule (0.4 mg) by mouth daily   Refills:  3        * Notice:  This list has 2 medication(s) that are the same as other medications prescribed for you. Read the directions carefully, and ask your doctor or other care provider to  review them with you.            Prescriptions were sent or printed at these locations (1 Prescription)                   Other Prescriptions                Printed at Department/Unit printer (1 of 1)         ciprofloxacin (CIPRO) 500 MG tablet                Procedures and tests performed during your visit     UA with Microscopic    Urine Culture      Orders Needing Specimen Collection     None      Pending Results     Date and Time Order Name Status Description    7/11/2018 0932 Urine Culture In process             Pending Culture Results     Date and Time Order Name Status Description    7/11/2018 0932 Urine Culture In process             Pending Results Instructions     If you had any lab results that were not finalized at the time of your Discharge, you can call the ED Lab Result RN at [phone]. You will be contacted by this team for any positive Lab results or changes in treatment. The nurses are available 7 days a week from 10A to 6:30P.  You can leave a message 24 hours per day and they will return your call.        Test Results From Your Hospital Stay        7/11/2018 10:27 AM      Component Results     Component Value Ref Range & Units Status    Color Urine Yellow  Final    Appearance Urine Slightly Cloudy  Final    Glucose Urine Negative NEG^Negative mg/dL Final    Bilirubin Urine Negative NEG^Negative Final    Ketones Urine Negative NEG^Negative mg/dL Final    Specific Gravity Urine 1.023 1.003 - 1.035 Final    Blood Urine Large (A) NEG^Negative Final    pH Urine 5.5 5.0 - 7.0 pH Final    Protein Albumin Urine 100 (A) NEG^Negative mg/dL Final    Urobilinogen mg/dL Normal 0.0 - 2.0 mg/dL Final    Nitrite Urine Positive (A) NEG^Negative Final    Leukocyte Esterase Urine Large (A) NEG^Negative Final    Source Catheterized Urine  Final    WBC Urine 145 (H) 0 - 5 /HPF Final    RBC Urine >182 (H) 0 - 2 /HPF Final    Bacteria Urine Moderate (A) NEG^Negative /HPF Final    Mucous Urine Present (A)  NEG^Negative /LPF Final         7/11/2018 10:12 AM                Clinical Quality Measure: Blood Pressure Screening     Your blood pressure was checked while you were in the emergency department today. The last reading we obtained was  BP: 123/86 . Please read the guidelines below about what these numbers mean and what you should do about them.  If your systolic blood pressure (the top number) is less than 120 and your diastolic blood pressure (the bottom number) is less than 80, then your blood pressure is normal. There is nothing more that you need to do about it.  If your systolic blood pressure (the top number) is 120-139 or your diastolic blood pressure (the bottom number) is 80-89, your blood pressure may be higher than it should be. You should have your blood pressure rechecked within a year by a primary care provider.  If your systolic blood pressure (the top number) is 140 or greater or your diastolic blood pressure (the bottom number) is 90 or greater, you may have high blood pressure. High blood pressure is treatable, but if left untreated over time it can put you at risk for heart attack, stroke, or kidney failure. You should have your blood pressure rechecked by a primary care provider within the next 4 weeks.  If your provider in the emergency department today gave you specific instructions to follow-up with your doctor or provider even sooner than that, you should follow that instruction and not wait for up to 4 weeks for your follow-up visit.        Thank you for choosing [facility]       Thank you for choosing [facility] for your care. Our goal is always to provide you with excellent care. Hearing back from our patients is one way we can continue to improve our services. Please take a few minutes to complete the written survey that you may receive in the mail after you visit with us. Thank you!        [facility] Information     [facility] lets you send messages to your doctor, view your test results, renew  "your prescriptions, schedule appointments and more. To sign up, go to [email or web address] . Click on \"Log in\" on the left side of the screen, which will take you to the Welcome page. Then click on \"Sign up Now\" on the right side of the page.     You will be asked to enter the access code listed below, as well as some personal information. Please follow the directions to create your username and password.     Your access code is: [email or web address]  Expires: 10/5/2018  9:34 AM     Your access code will  in 90 days. If you need help or a new code, please call your [facility] clinic or [phone].        Care EveryWhere ID     This is your Care EveryWhere ID. This could be used by other organizations to access your [facility] medical records  [id number]        Equal Access to Services     RAI VITALE : Neha Chapa, alyse cisneros, nilda hanley, alejandro murray . So [facility] [phone].    ATENCIÓN: Si habla español, tiene a de leon disposición servicios gratuitos de asistencia lingüística. Llame al [phone].    We comply with applicable federal civil rights laws and [place] laws. We do not discriminate on the basis of race, color, national origin, age, disability, sex, sexual orientation, or gender identity.            After Visit Summary       This is your record. Keep this with you and show to your community pharmacist(s) and doctor(s) at your next visit.                  "

## 2018-07-11 NOTE — ED AVS SNAPSHOT
Emergency Department    [address] [place] [address]    Phone:  [phone]    Fax:  [phone]                                       Sam Barry   MRN: [id number]    Department:   Emergency Department   Date of Visit:  7/11/2018           After Visit Summary Signature Page     I have received my discharge instructions, and my questions have been answered. I have discussed any challenges I see with this plan with the nurse or doctor.    ..........................................................................................................................................  Patient/Patient Representative Signature      ..........................................................................................................................................  Patient Representative Print Name and Relationship to Patient    ..................................................               ................................................  Date                                            Time    ..........................................................................................................................................  Reviewed by Signature/Title    ...................................................              ..............................................  Date                                                            Time

## 2018-07-11 NOTE — ED PROVIDER NOTES
History     Chief Complaint:  Catheter Problem    HPI   Sam Barry is a 73 year old male on Flomax who presents to the emergency department today for evaluation of painful urination. The patient has had a catheter for the past 4-5 days, and up until the past day or so it had been working fine. Now however, the catheter is not draining and seems to be leaking and the patient notes considerble dysuria when he feels he needs to urinate. When it does leak, he has dysuria. He denies abdominal pain, fever, shortness of breath, nausea, and back pain. He does note than he has a follow-up appointment scheduled already for 5 days from now. Of note, the last time he had a catheter, he had the same issues.    Allergies:  No Known Drug Allergies    Medications:    cholecalciferol (D3-50) 05486 UNITS capsule  cod liver oil CAPS capsule  Multiple Vitamin (MULTI-VITAMINS) TABS  oxybutynin (DITROPAN XL) 5 MG 24 hr tablet  sennosides (SENOKOT) 8.6 MG tablet  tamsulosin (FLOMAX) 0.4 MG capsule    Past Medical History:    History reviewed. No pertinent medical history.    Past Surgical History:    History reviewed. No pertinent surgical history.    Family History:    History reviewed. No pertinent family history.    Social History:  The patient was accompanied to the ED by his wife.  Smoking Status: Never Smoker  Smokeless Tobacco: Never Used  Alcohol Use: Negative   Marital Status:       Review of Systems   Constitutional: Negative for fever.   Respiratory: Negative for shortness of breath.    Gastrointestinal: Negative for abdominal pain and nausea.   Genitourinary: Positive for dysuria.   Musculoskeletal: Negative for back pain.   10 point review of systems performed and is negative except as above and in HPI.     Physical Exam     Patient Vitals for the past 24 hrs:   BP Temp Temp src Pulse Resp SpO2 Height Weight   07/11/18 1125 123/86 - - 93 - 94 % - -   07/11/18 1000 129/84 - - 98 18 94 % - -   07/11/18 0922 (!)  "165/98 98.7  F (37.1  C) Oral 117 20 96 % 1.753 m (5' 9\") 81.6 kg (180 lb)      Physical Exam  General: Resting on gurney appears somewhat uncomfortable.  Head:  The scalp, face, and head appear normal  Mouth/Throat: Mucus membranes are moist  CV:  Regular rate    Normal S1 and S2  No pathological murmur   Resp:  Breath sounds clear and equal bilaterally    Non-labored, no retractions or accessory muscle use    No coarseness    No wheezing   GI:  Abdomen is soft, no rigidity    No tenderness to palpation  :   No suprapubic tenderness or fullness. Cardenas in place, draining appropriately.   MS:  Normal motor assessment of all extremities.    Good capillary refill noted.    No CVA tenderness to percussion.  Skin:  No rash or lesions noted.  Neuro:  Speech is normal and fluent. No apparent deficit.  Psych:  Awake. Alert.  Normal affect.      Appropriate interactions.    Emergency Department Course     Laboratory:  Laboratory findings were communicated with the patient who voiced understanding of the findings.    UA: Blood large, Albumin 100, Nitrite positive, LeukEst large, , RBC >182, Bacteria moderate, Mucous present  Urine culture: Pending    Interventions:  1125 Cipro 500 mg PO    Emergency Department Course:    0910 Nursing notes and vitals reviewed.    0923 I performed an exam of the patient as documented above.     1007 The patient provided a urine sample here in the emergency department. This was sent for laboratory testing, findings above.     1107 I personally reviewed the lab results with the patient and answered all related questions prior to discharge.    Impression & Plan      Medical Decision Making:  Sam Barry is a 73 year old male who presents for evaluation of painful urination.  Catheter removed and UA obtained, which is consistent with UTI, which is liekly the cause of his symptoms.   There has been no fever, back/flank pain or significant abdominal pain.  There is no clinical " evidence of pyelonephritis, appendicitis, colitis, diverticulitis or any intraabdominal catastrophe. The patient will be started on antibiotics for the infection.  Discussed risks and benefits of ongoing catheter use and will attempt removal.  Patient and wife aware he may need to return for inability to void.  Return if increasing pain, vomiting, fever, or inability to tolerate the oral antibiotic.  Follow up with primary physician is indicated if not improving in 2-3 days.     Diagnosis:    ICD-10-CM    1. Acute cystitis with hematuria N30.01      Disposition:   Discharge    Discharge Medications:  Discharge Medication List as of 7/11/2018 11:28 AM      START taking these medications    Details   ciprofloxacin (CIPRO) 500 MG tablet Take 1 tablet (500 mg) by mouth 2 times daily for 7 days, Disp-14 tablet, R-0, Local Print           Scribe Disclosure:  Pako MCCRACKEN, am serving as a scribe at 9:23 AM on 7/11/2018 to document services personally performed by Francesca Junior MD based on my observations and the provider's statements to me.      EMERGENCY DEPARTMENT       Francesca Junior MD  07/12/18 0035

## 2018-07-13 LAB
BACTERIA SPEC CULT: ABNORMAL
Lab: ABNORMAL
SPECIMEN SOURCE: ABNORMAL

## 2018-07-18 ENCOUNTER — OFFICE VISIT (OUTPATIENT)
Dept: UROLOGY | Facility: CLINIC | Age: 73
End: 2018-07-18
Payer: COMMERCIAL

## 2018-07-18 VITALS
HEIGHT: 69 IN | BODY MASS INDEX: 26.66 KG/M2 | WEIGHT: 180 LBS | DIASTOLIC BLOOD PRESSURE: 100 MMHG | HEART RATE: 70 BPM | SYSTOLIC BLOOD PRESSURE: 150 MMHG

## 2018-07-18 DIAGNOSIS — R33.9 URINARY RETENTION: Primary | ICD-10-CM

## 2018-07-18 LAB
ALBUMIN UR-MCNC: 30 MG/DL
APPEARANCE UR: CLEAR
BILIRUB UR QL STRIP: NEGATIVE
COLOR UR AUTO: YELLOW
GLUCOSE UR STRIP-MCNC: NEGATIVE MG/DL
HGB UR QL STRIP: ABNORMAL
KETONES UR STRIP-MCNC: NEGATIVE MG/DL
LEUKOCYTE ESTERASE UR QL STRIP: NEGATIVE
NITRATE UR QL: NEGATIVE
PH UR STRIP: 5 PH (ref 5–7)
RESIDUAL VOLUME (RV) (EXTERNAL): 33
SOURCE: ABNORMAL
SP GR UR STRIP: >1.03 (ref 1–1.03)
UROBILINOGEN UR STRIP-ACNC: 0.2 EU/DL (ref 0.2–1)

## 2018-07-18 PROCEDURE — 99214 OFFICE O/P EST MOD 30 MIN: CPT | Mod: 25 | Performed by: UROLOGY

## 2018-07-18 PROCEDURE — 81003 URINALYSIS AUTO W/O SCOPE: CPT | Performed by: UROLOGY

## 2018-07-18 PROCEDURE — 51798 US URINE CAPACITY MEASURE: CPT | Performed by: UROLOGY

## 2018-07-18 ASSESSMENT — PAIN SCALES - GENERAL: PAINLEVEL: NO PAIN (0)

## 2018-07-18 NOTE — MR AVS SNAPSHOT
"              After Visit Summary   2018    Sam Barry    MRN: [id number]           Patient Information     Date Of Birth          1945        Visit Information        Provider Department      2018 8:00 AM Fredy Feliz MD; [facility] [place]        Today's Diagnoses     Urinary retention    -  1       Follow-ups after your visit        Follow-up notes from your care team     Return if symptoms worsen or fail to improve.      Who to contact     If you have questions or need follow up information about today's clinic visit or your schedule please contact [facility] [place] directly at [phone].  Normal or non-critical lab and imaging results will be communicated to you by MyChart, letter or phone within 4 business days after the clinic has received the results. If you do not hear from us within 7 days, please contact the clinic through [employer] or phone. If you have a critical or abnormal lab result, we will notify you by phone as soon as possible.  Submit refill requests through [employer] or call your pharmacy and they will forward the refill request to us. Please allow 3 business days for your refill to be completed.          Additional Information About Your Visit        MyChart Information     NAVX lets you send messages to your doctor, view your test results, renew your prescriptions, schedule appointments and more. To sign up, go to [email or web address] . Click on \"Log in\" on the left side of the screen, which will take you to the Welcome page. Then click on \"Sign up Now\" on the right side of the page.     You will be asked to enter the access code listed below, as well as some personal information. Please follow the directions to create your username and password.     Your access code is: [email or web address]  Expires: 10/5/2018  9:34 AM     Your access code will  in 90 days. If you need help or a new " "code, please call your [facility] clinic or [phone].        Care EveryWhere ID     This is your Care EveryWhere ID. This could be used by other organizations to access your [facility] medical records  [id number]        Your Vitals Were     Pulse Height BMI (Body Mass Index)             70 1.753 m (5' 9\") 26.58 kg/m2          Blood Pressure from Last 3 Encounters:   07/18/18 (!) 150/100   07/11/18 123/86   07/07/18 165/86    Weight from Last 3 Encounters:   07/18/18 81.6 kg (180 lb)   07/11/18 81.6 kg (180 lb)   07/07/18 89.8 kg (198 lb)              We Performed the Following     MEASURE POST-VOID RESIDUAL URINE/BLADDER CAPACITY, US NON-IMAGING     UA without Microscopic        Primary Care Provider Office Phone # Fax #    Jovan [facility] [phone]       [address] [place] [id number]        Equal Access to Services     RAI VITALE : Hadii aad ku hadasho Soomaali, waaxda luqadaha, qaybta kaalmada adeegyada, waxay idiin haylul murray . So [facility] [phone].    ATENCIÓN: Si habla español, tiene a de leon disposición servicios gratuitos de asistencia lingüística. Laishaame al [phone].    We comply with applicable federal civil rights laws and [place] laws. We do not discriminate on the basis of race, color, national origin, age, disability, sex, sexual orientation, or gender identity.            Thank you!     Thank you for choosing [facility] [place]  for your care. Our goal is always to provide you with excellent care. Hearing back from our patients is one way we can continue to improve our services. Please take a few minutes to complete the written survey that you may receive in the mail after your visit with us. Thank you!             Your Updated Medication List - Protect others around you: Learn how to safely use, store and throw away your medicines at www.disposemymeds.org.          This list is accurate as of 7/18/18  8:50 AM.  " Always use your most recent med list.                   Brand Name Dispense Instructions for use Diagnosis    ciprofloxacin 500 MG tablet    CIPRO    14 tablet    Take 1 tablet (500 mg) by mouth 2 times daily for 7 days        cod liver oil Caps capsule      Take 1 capsule by mouth        D3-50 39068 units capsule   Generic drug:  cholecalciferol      Take 50,000 Units by mouth        MULTI-VITAMINS Tabs      Take 1 tablet by mouth        oxybutynin 5 MG 24 hr tablet    DITROPAN XL    7 tablet    Take 1 tablet (5 mg) by mouth daily    Benign prostatic hyperplasia with urinary retention       sennosides 8.6 MG tablet    SENOKOT    20 tablet    Take 1-3 tablets by mouth daily as needed for constipation    Constipation, unspecified constipation type       * tamsulosin 0.4 MG capsule    FLOMAX    30 capsule    Take 1 capsule (0.4 mg) by mouth daily    Benign prostatic hyperplasia with urinary retention       * tamsulosin 0.4 MG capsule    FLOMAX    90 capsule    Take 1 capsule (0.4 mg) by mouth daily    Urinary retention       * Notice:  This list has 2 medication(s) that are the same as other medications prescribed for you. Read the directions carefully, and ask your doctor or other care provider to review them with you.

## 2018-07-18 NOTE — PROGRESS NOTES
History: It is a great pleasure to see this very pleasant 73-year-old gentleman in follow-up consultation today.  He has been seen by Barbara recently with a history of urinary retention requiring catheterization in March of this year.  Following this she did pass a trial of voiding, and is now on Flomax 0.4 mg daily  He did develop urinary retention again in early July.  He was catheterized at that time for about 300 cc.  Once again since then he is now voiding satisfactorily.  His symptom score today is 1.  His postvoid residual is only 33 cc.  He has no other complaints at this time  He did have a urinary tract infection about a week ago which was treated successfully and today the urinalysis is negative.        No past medical history on file.    Social History     Social History     Marital status:      Spouse name: N/A     Number of children: N/A     Years of education: N/A     Social History Main Topics     Smoking status: Never Smoker     Smokeless tobacco: Never Used     Alcohol use No     Drug use: No     Sexual activity: Not Asked     Other Topics Concern     None     Social History Narrative       No past surgical history on file.    No family history on file.      Current Outpatient Prescriptions:      cholecalciferol (D3-50) 09626 UNITS capsule, Take 50,000 Units by mouth, Disp: , Rfl:      ciprofloxacin (CIPRO) 500 MG tablet, Take 1 tablet (500 mg) by mouth 2 times daily for 7 days, Disp: 14 tablet, Rfl: 0     cod liver oil CAPS capsule, Take 1 capsule by mouth, Disp: , Rfl:      Multiple Vitamin (MULTI-VITAMINS) TABS, Take 1 tablet by mouth, Disp: , Rfl:      sennosides (SENOKOT) 8.6 MG tablet, Take 1-3 tablets by mouth daily as needed for constipation, Disp: 20 tablet, Rfl: 0     oxybutynin (DITROPAN XL) 5 MG 24 hr tablet, Take 1 tablet (5 mg) by mouth daily (Patient not taking: Reported on 7/18/2018), Disp: 7 tablet, Rfl: 0     tamsulosin (FLOMAX) 0.4 MG capsule, Take 1 capsule (0.4 mg) by  "mouth daily (Patient not taking: Reported on 7/18/2018), Disp: 90 capsule, Rfl: 3     tamsulosin (FLOMAX) 0.4 MG capsule, Take 1 capsule (0.4 mg) by mouth daily (Patient not taking: Reported on 7/18/2018), Disp: 30 capsule, Rfl: 1    10 point ROS of systems including Constitutional, Eyes, Respiratory, Cardiovascular, Gastroenterology, Genitourinary, Integumentary, Muscularskeletal, Psychiatric were all negative except for pertinent positives noted in my HPI.    Examination:   BP (!) 150/100  Pulse 70  Ht 1.753 m (5' 9\")  Wt 81.6 kg (180 lb)  BMI 26.58 kg/m2  General Impression: Very pleasant gentleman in no acute distress who is well oriented to time place and person  Mental Status: Normal.  HEENT.  There is no clinical evidence of jaundice and the mucous membranes are normal  Skin: The skin is otherwise unremarkable  Respiratory System: The respiratory cycle is normal  Lymph Nodes: Not examined  Back/Flank Tenderness: No flank tenderness  Cardiovascular System: Not examined  Abdominal Examination: Not examined  Extremities: No significant peripheral edema  Genitial: Not examined  Rectal Examination: Not examined  Neurologic System: There are no focal abnormal clinical neurological signs in the central, or peripheral nervous systems    Impression: The patient is voiding well with a very low symptom score with a low postvoid residual with no evidence of infection in the urine and seems very content with the situation.  I reviewed his medications carefully today.  He should continue on tamsulosin 0.4 mg daily.  I have explained to him that it should difficult symptoms occur again we will need to arrange for cystoscopy to evaluate the bladder outlet for the time being he seems to be doing very well.  I note the most recent PSA 3 months ago was 3.5  I did discuss the entire situation with the patient in detail today.  I answered all his questions      Plan: I will see him on a as needed basis.  It was carefully " "explained to him to return should further problems with voiding occur.    Time: 30 minutes with greater than 50% spent in discussion consultation    \"This dictation was performed with voice recognition software and may contain errors,  omissions and inadvertent word substitution.\"      "

## 2018-07-18 NOTE — LETTER
7/18/2018       RE: Sam Barry  [address] [address]     Dear Colleague,    Thank you for referring your patient, Sam Barry, to the [facility] [place] at [facility]. Please see a copy of my visit note below.    History: It is a great pleasure to see this very pleasant 73-year-old gentleman in follow-up consultation today.  He has been seen by Barbara recently with a history of urinary retention requiring catheterization in March of this year.  Following this she did pass a trial of voiding, and is now on Flomax 0.4 mg daily  He did develop urinary retention again in early July.  He was catheterized at that time for about 300 cc.  Once again since then he is now voiding satisfactorily.  His symptom score today is 1.  His postvoid residual is only 33 cc.  He has no other complaints at this time  He did have a urinary tract infection about a week ago which was treated successfully and today the urinalysis is negative.        No past medical history on file.    Social History     Social History     Marital status:      Spouse name: N/A     Number of children: N/A     Years of education: N/A     Social History Main Topics     Smoking status: Never Smoker     Smokeless tobacco: Never Used     Alcohol use No     Drug use: No     Sexual activity: Not Asked     Other Topics Concern     None     Social History Narrative       No past surgical history on file.    No family history on file.      Current Outpatient Prescriptions:      cholecalciferol (D3-50) 06491 UNITS capsule, Take 50,000 Units by mouth, Disp: , Rfl:      ciprofloxacin (CIPRO) 500 MG tablet, Take 1 tablet (500 mg) by mouth 2 times daily for 7 days, Disp: 14 tablet, Rfl: 0     cod liver oil CAPS capsule, Take 1 capsule by mouth, Disp: , Rfl:      Multiple Vitamin (MULTI-VITAMINS) TABS, Take 1 tablet by mouth, Disp: , Rfl:      sennosides  "(SENOKOT) 8.6 MG tablet, Take 1-3 tablets by mouth daily as needed for constipation, Disp: 20 tablet, Rfl: 0     oxybutynin (DITROPAN XL) 5 MG 24 hr tablet, Take 1 tablet (5 mg) by mouth daily (Patient not taking: Reported on 7/18/2018), Disp: 7 tablet, Rfl: 0     tamsulosin (FLOMAX) 0.4 MG capsule, Take 1 capsule (0.4 mg) by mouth daily (Patient not taking: Reported on 7/18/2018), Disp: 90 capsule, Rfl: 3     tamsulosin (FLOMAX) 0.4 MG capsule, Take 1 capsule (0.4 mg) by mouth daily (Patient not taking: Reported on 7/18/2018), Disp: 30 capsule, Rfl: 1    10 point ROS of systems including Constitutional, Eyes, Respiratory, Cardiovascular, Gastroenterology, Genitourinary, Integumentary, Muscularskeletal, Psychiatric were all negative except for pertinent positives noted in my HPI.    Examination:   BP (!) 150/100  Pulse 70  Ht 1.753 m (5' 9\")  Wt 81.6 kg (180 lb)  BMI 26.58 kg/m2  General Impression: Very pleasant gentleman in no acute distress who is well oriented to time place and person  Mental Status: Normal.  HEENT.  There is no clinical evidence of jaundice and the mucous membranes are normal  Skin: The skin is otherwise unremarkable  Respiratory System: The respiratory cycle is normal  Lymph Nodes: Not examined  Back/Flank Tenderness: No flank tenderness  Cardiovascular System: Not examined  Abdominal Examination: Not examined  Extremities: No significant peripheral edema  Genitial: Not examined  Rectal Examination: Not examined  Neurologic System: There are no focal abnormal clinical neurological signs in the central, or peripheral nervous systems    Impression: The patient is voiding well with a very low symptom score with a low postvoid residual with no evidence of infection in the urine and seems very content with the situation.  I reviewed his medications carefully today.  He should continue on tamsulosin 0.4 mg daily.  I have explained to him that it should difficult symptoms occur again we will need " "to arrange for cystoscopy to evaluate the bladder outlet for the time being he seems to be doing very well.  I note the most recent PSA 3 months ago was 3.5  I did discuss the entire situation with the patient in detail today.  I answered all his questions      Plan: I will see him on a as needed basis.  It was carefully explained to him to return should further problems with voiding occur.    Time: 30 minutes with greater than 50% spent in discussion consultation    \"This dictation was performed with voice recognition software and may contain errors,  omissions and inadvertent word substitution.\"        Again, thank you for allowing me to participate in the care of your patient.      Sincerely,    Fredy Feliz MD      "

## 2018-08-07 ENCOUNTER — HOSPITAL ENCOUNTER (EMERGENCY)
Facility: CLINIC | Age: 73
Discharge: HOME | End: 2018-08-07
Attending: EMERGENCY MEDICINE | Admitting: EMERGENCY MEDICINE
Payer: COMMERCIAL

## 2018-08-07 ENCOUNTER — TELEPHONE (OUTPATIENT)
Dept: UROLOGY | Facility: CLINIC | Age: 73
End: 2018-08-07

## 2018-08-07 VITALS
BODY MASS INDEX: 29.62 KG/M2 | HEART RATE: 100 BPM | TEMPERATURE: 98.4 F | OXYGEN SATURATION: 98 % | SYSTOLIC BLOOD PRESSURE: 149 MMHG | RESPIRATION RATE: 20 BRPM | HEIGHT: 69 IN | WEIGHT: 200 LBS | DIASTOLIC BLOOD PRESSURE: 101 MMHG

## 2018-08-07 DIAGNOSIS — R33.9 URINARY RETENTION: ICD-10-CM

## 2018-08-07 LAB
ALBUMIN UR-MCNC: NEGATIVE MG/DL
APPEARANCE UR: CLEAR
BILIRUB UR QL STRIP: NEGATIVE
COLOR UR AUTO: NORMAL
GLUCOSE UR STRIP-MCNC: NEGATIVE MG/DL
HGB UR QL STRIP: NEGATIVE
KETONES UR STRIP-MCNC: NEGATIVE MG/DL
LEUKOCYTE ESTERASE UR QL STRIP: NEGATIVE
NITRATE UR QL: NEGATIVE
PH UR STRIP: 6 PH (ref 5–7)
RBC #/AREA URNS AUTO: 1 /HPF (ref 0–2)
SOURCE: NORMAL
SP GR UR STRIP: 1 (ref 1–1.03)
UROBILINOGEN UR STRIP-MCNC: NORMAL MG/DL (ref 0–2)
WBC #/AREA URNS AUTO: 2 /HPF (ref 0–5)

## 2018-08-07 PROCEDURE — 51702 INSERT TEMP BLADDER CATH: CPT

## 2018-08-07 PROCEDURE — 99284 EMERGENCY DEPT VISIT MOD MDM: CPT | Mod: 25

## 2018-08-07 PROCEDURE — 51798 US URINE CAPACITY MEASURE: CPT

## 2018-08-07 PROCEDURE — 25000125 ZZHC RX 250: Performed by: EMERGENCY MEDICINE

## 2018-08-07 PROCEDURE — 81001 URINALYSIS AUTO W/SCOPE: CPT | Performed by: EMERGENCY MEDICINE

## 2018-08-07 RX ADMIN — LIDOCAINE HYDROCHLORIDE 10 ML: 20 JELLY TOPICAL at 15:24

## 2018-08-07 ASSESSMENT — ENCOUNTER SYMPTOMS
NAUSEA: 0
FLANK PAIN: 0
DYSURIA: 1
FEVER: 0
BACK PAIN: 0
ABDOMINAL PAIN: 0
DIFFICULTY URINATING: 1
HEMATURIA: 0
CHILLS: 0
VOMITING: 0

## 2018-08-07 NOTE — TELEPHONE ENCOUNTER
Patient called nurse line and LM. Returned a phone call and was unable to leave a message. (Message Box Full).    Jessica Cruz LPN

## 2018-08-07 NOTE — ED AVS SNAPSHOT
Emergency Department    [address] [place] [id number]    Phone:  [phone]    Fax:  [phone]                                       Sam Barry   MRN: [id number]    Department:   Emergency Department   Date of Visit:  8/7/2018           After Visit Summary Signature Page     I have received my discharge instructions, and my questions have been answered. I have discussed any challenges I see with this plan with the nurse or doctor.    ..........................................................................................................................................  Patient/Patient Representative Signature      ..........................................................................................................................................  Patient Representative Print Name and Relationship to Patient    ..................................................               ................................................  Date                                            Time    ..........................................................................................................................................  Reviewed by Signature/Title    ...................................................              ..............................................  Date                                                            Time

## 2018-08-07 NOTE — DISCHARGE INSTRUCTIONS
Cardenas Catheter Care    A Cardenas catheter is a rubber tube that is placed through the urethra (opening where urine comes out) and into the bladder. This helps drain urine from the bladder. There is a small balloon on the end of the tube that is inflated after insertion. This keeps the catheter from sliding out of the bladder.  A Cardenas catheter is used to treat urinary retention (unable to pass urine). It is also used when there is incontinence (loss of bladder control).  Home care    Finish taking any prescribed antibiotic even if you are feeling better before then.    It is important to keep bacteria from getting into the collection bag. Do not disconnect the catheter from the collection bag.    Use a leg band to secure the drainage tube, so it does not pull on the catheter. Drain the collection bag when it becomes full using the drain spout at the bottom of the bag.    Do not try to pull or remove your catheter. This will injure your urethra. It must be removed by your healthcare provider or nurse.  Follow-up care  Follow up with your healthcare provider as advised for repeat urine testing and catheter removal or replacement.  When to seek medical advice  Call your healthcare provider right away if any of these occur:    Fever of 100.4 F (38 C) or higher, or as directed by your healthcare provider    Bladder pain or fullness    Abdominal swelling, nausea or vomiting, or back pain    Blood or urine leakage around the catheter    Bloody urine coming from the catheter (if a new symptom)    Catheter falls out    Catheter stops draining for 6 hours    Weakness, dizziness, or fainting  Date Last Reviewed: 10/1/2016    [id number] [employer]. [address] [place] [address], [place] [address]. All rights reserved. This information is not intended as a substitute for professional medical care. Always follow your healthcare professional's instructions.

## 2018-08-07 NOTE — ED AVS SNAPSHOT
Emergency Department    [address]    [facility] [address]    Phone:  [phone]    Fax:  [phone]                                       Sam Barry   MRN: [id number]    Department:   Emergency Department   Date of Visit:  8/7/2018           Patient Information     Date Of Birth          1945        Your diagnoses for this visit were:     Urinary retention        You were seen by Carlos Allen MD.      Follow-up Information     Follow up with Fredy Feliz MD.    Specialty:  Urology    Contact information:    [address] [facility]  [facility] [address]  [phone]          Discharge Instructions         Cardenas Catheter Care    A Cardenas catheter is a rubber tube that is placed through the urethra (opening where urine comes out) and into the bladder. This helps drain urine from the bladder. There is a small balloon on the end of the tube that is inflated after insertion. This keeps the catheter from sliding out of the bladder.  A Cardenas catheter is used to treat urinary retention (unable to pass urine). It is also used when there is incontinence (loss of bladder control).  Home care    Finish taking any prescribed antibiotic even if you are feeling better before then.    It is important to keep bacteria from getting into the collection bag. Do not disconnect the catheter from the collection bag.    Use a leg band to secure the drainage tube, so it does not pull on the catheter. Drain the collection bag when it becomes full using the drain spout at the bottom of the bag.    Do not try to pull or remove your catheter. This will injure your urethra. It must be removed by your healthcare provider or nurse.  Follow-up care  Follow up with your healthcare provider as advised for repeat urine testing and catheter removal or replacement.  When to seek medical advice  Call your healthcare provider right away if any of these occur:    Fever of 100.4 F (38 C) or higher, or as directed  by your healthcare provider    Bladder pain or fullness    Abdominal swelling, nausea or vomiting, or back pain    Blood or urine leakage around the catheter    Bloody urine coming from the catheter (if a new symptom)    Catheter falls out    Catheter stops draining for 6 hours    Weakness, dizziness, or fainting  Date Last Reviewed: 10/1/2016    3809-6005 [employer]. [address] [place] [address]. All rights reserved. This information is not intended as a substitute for professional medical care. Always follow your healthcare professional's instructions.          24 Hour Appointment Hotline       To make an appointment at any [facility], call [phone] ([phone]). If you don't have a family doctor or clinic, we will help you find one. [facility] clinics are conveniently located to serve the needs of you and your family.             Review of your medicines      Our records show that you are taking the medicines listed below. If these are incorrect, please call your family doctor or clinic.        Dose / Directions Last dose taken    CIPROFLOXACIN PO        Refills:  0        cod liver oil Caps capsule   Dose:  1 capsule        Take 1 capsule by mouth   Refills:  0        D3-50 55370 units capsule   Dose:  74767 Units   Generic drug:  cholecalciferol        Take 50,000 Units by mouth   Refills:  0        MULTI-VITAMINS Tabs   Dose:  1 tablet        Take 1 tablet by mouth   Refills:  0        oxybutynin 5 MG 24 hr tablet   Commonly known as:  DITROPAN XL   Dose:  5 mg   Quantity:  7 tablet        Take 1 tablet (5 mg) by mouth daily   Refills:  0        sennosides 8.6 MG tablet   Commonly known as:  SENOKOT   Dose:  1-3 tablet   Quantity:  20 tablet        Take 1-3 tablets by mouth daily as needed for constipation   Refills:  0        tamsulosin 0.4 MG capsule   Commonly known as:  FLOMAX   Dose:  0.4 mg   Quantity:  90 capsule        Take 1 capsule (0.4 mg) by mouth daily    Refills:  3                Procedures and tests performed during your visit     UA with Microscopic      Orders Needing Specimen Collection     None      Pending Results     No orders found from 8/5/2018 to 8/8/2018.            Pending Culture Results     No orders found from 8/5/2018 to 8/8/2018.            Pending Results Instructions     If you had any lab results that were not finalized at the time of your Discharge, you can call the ED Lab Result RN at [phone]. You will be contacted by this team for any positive Lab results or changes in treatment. The nurses are available 7 days a week from 10A to 6:30P.  You can leave a message 24 hours per day and they will return your call.        Test Results From Your Hospital Stay        8/7/2018  2:43 PM      Component Results     Component Value Ref Range & Units Status    Color Urine Straw  Final    Appearance Urine Clear  Final    Glucose Urine Negative NEG^Negative mg/dL Final    Bilirubin Urine Negative NEG^Negative Final    Ketones Urine Negative NEG^Negative mg/dL Final    Specific Gravity Urine 1.003 1.003 - 1.035 Final    Blood Urine Negative NEG^Negative Final    pH Urine 6.0 5.0 - 7.0 pH Final    Protein Albumin Urine Negative NEG^Negative mg/dL Final    Urobilinogen mg/dL Normal 0.0 - 2.0 mg/dL Final    Nitrite Urine Negative NEG^Negative Final    Leukocyte Esterase Urine Negative NEG^Negative Final    Source Midstream Urine  Final    WBC Urine 2 0 - 5 /HPF Final    RBC Urine 1 0 - 2 /HPF Final                Clinical Quality Measure: Blood Pressure Screening     Your blood pressure was checked while you were in the emergency department today. The last reading we obtained was  BP: (!) 149/101 . Please read the guidelines below about what these numbers mean and what you should do about them.  If your systolic blood pressure (the top number) is less than 120 and your diastolic blood pressure (the bottom number) is less than 80, then your blood pressure  "is normal. There is nothing more that you need to do about it.  If your systolic blood pressure (the top number) is 120-139 or your diastolic blood pressure (the bottom number) is 80-89, your blood pressure may be higher than it should be. You should have your blood pressure rechecked within a year by a primary care provider.  If your systolic blood pressure (the top number) is 140 or greater or your diastolic blood pressure (the bottom number) is 90 or greater, you may have high blood pressure. High blood pressure is treatable, but if left untreated over time it can put you at risk for heart attack, stroke, or kidney failure. You should have your blood pressure rechecked by a primary care provider within the next 4 weeks.  If your provider in the emergency department today gave you specific instructions to follow-up with your doctor or provider even sooner than that, you should follow that instruction and not wait for up to 4 weeks for your follow-up visit.        Thank you for choosing [facility]       Thank you for choosing [facility] for your care. Our goal is always to provide you with excellent care. Hearing back from our patients is one way we can continue to improve our services. Please take a few minutes to complete the written survey that you may receive in the mail after you visit with us. Thank you!        [employer] Information     [employer] lets you send messages to your doctor, view your test results, renew your prescriptions, schedule appointments and more. To sign up, go to [email or web address] . Click on \"Log in\" on the left side of the screen, which will take you to the Welcome page. Then click on \"Sign up Now\" on the right side of the page.     You will be asked to enter the access code listed below, as well as some personal information. Please follow the directions to create your username and password.     Your access code is: [email or web address]  Expires: 10/5/2018  9:34 AM     Your access code will  " in 90 days. If you need help or a new code, please call your [facility] clinic or [phone].        Care EveryWhere ID     This is your Care EveryWhere ID. This could be used by other organizations to access your [facility] medical records  [id number]        Equal Access to Services     RAI VITALE : Neha Chapa, wafrank luqadaha, qaclaribel kaalparadise hanley, alejandro arguello. So [facility] [phone].    ATENCIÓN: Si habla español, tiene a de leon disposición servicios gratuitos de asistencia lingüística. Llame al [phone].    We comply with applicable federal civil rights laws and [place] laws. We do not discriminate on the basis of race, color, national origin, age, disability, sex, sexual orientation, or gender identity.            After Visit Summary       This is your record. Keep this with you and show to your community pharmacist(s) and doctor(s) at your next visit.

## 2018-08-07 NOTE — ED PROVIDER NOTES
History     Chief Complaint:  Dysuria; Urinary Retention    HPI   Sam Barry is a 73 year old male who presents for evaluation of urinary retention and dysuria. The patient has a history of recurrent problems with urinary retention and has been seen in the ER multiple times in the recent months. He follows with Dr. Feliz of urology. He required catheterization for urinary retention in March, and again in July for recurrent urinary retention. Cardenas was removed at an ER visit on 7/11. He was seen in urology clinic on 7/18 and was still voiding successfully, postvoid residual only 33 ml. The patient presents to the ED today for a 1-2 day history of dysuria and difficulty urinating. He reports it feels like he tightens up when he tries to urinate and is only able to dribble a few drops. He is also having burning with urination. He notes he has been constipated recently and is taking Senna. His constipation is improving, but he still has ongoing urinary symptoms. He denies any fevers, chills, hematuria, flank pain, abdominal pain.  He is taking Flomax.     Allergies:  No Known Allergies     Medications:    Cholecalciferol  Oxybutynin  Sennosides  Tamsulosin     Past Medical History:    History reviewed. No pertinent past medical history.    Past Surgical History:    History reviewed. No pertinent surgical history.    Family History:    History reviewed. No pertinent family history.     Social History:  Smoking status: Never smoker  Alcohol use: No  Presents to the ED with his wife  Marital Status:   [2]     Review of Systems   Constitutional: Negative for chills and fever.   Gastrointestinal: Negative for abdominal pain, nausea and vomiting.   Genitourinary: Positive for difficulty urinating and dysuria. Negative for flank pain and hematuria.   Musculoskeletal: Negative for back pain.   All other systems reviewed and are negative.      Physical Exam   Patient Vitals for the past 24 hrs:   BP Temp Temp  "src Pulse Heart Rate Resp SpO2 Height Weight   08/07/18 1531 (!) 149/101 - - 100 100 20 98 % - -   08/07/18 1354 (!) 154/106 98.4  F (36.9  C) Oral - 118 20 97 % 1.753 m (5' 9\") 90.7 kg (200 lb)       Physical Exam  GENERAL: well developed, pleasant  HEAD: atraumatic  EYES: pupils reactive, extraocular muscles intact, conjunctivae normal  ENT:  mucus membranes moist  NECK:  trachea midline, normal range of motion  RESPIRATORY: no tachypnea, breath sounds clear to auscultation   CVS: normal S1/S2, no murmurs, intact distal pulses  ABDOMEN: soft, nontender, nondistention  MUSCULOSKELETAL: no deformities  SKIN: warm and dry, no acute rashes or ulceration  NEURO: GCS 15, cranial nerves intact, alert and oriented x3  PSYCH:  Mood/affect normal    Emergency Department Course     Laboratory:  UA with micro: Negative     Interventions:  1524: Lidocaine 10 mL urethral     Emergency Department Course:  Past medical records, nursing notes, and vitals reviewed.  1416: I performed an exam of the patient and obtained history, as documented above.     Post void bladder scan reveals 406 ml. Cardenas catheter placed by nursing staff.     I rechecked the patient. Explained findings to patient.    1604: I rechecked the patient. Findings and plan explained to the Patient. Patient discharged home with instructions regarding supportive care, medications, and reasons to return. The importance of close follow-up was reviewed.     Impression & Plan      Medical Decision Making:  Patient presents with dysuria and a history of urinary retention. UA is negative for infection. The post-void bladder scan is 400. Cardenas catheter was inserted. He has been followed by Dr. Feliz and can follow up with him. He is already on Flomax. Wife notes that he appears to be uncomfortable when he attempts to urinate, possibly he needs some B&O suppositories in the future but will hold off on that as we are now placing a catheter.      Diagnosis:    ICD-10-CM   1. " Urinary retention R33.9     Disposition:  discharged to home  Kelsey Irizarry  8/7/2018    EMERGENCY DEPARTMENT    I, Kelsey Irizarry, am serving as a scribe at 2:16 PM on 8/7/2018 to document services personally performed by Carlos Allen MD based on my observations and the provider's statements to me.        Carlos Allen MD  08/07/18 4646

## 2018-08-14 DIAGNOSIS — R33.9 URINARY RETENTION: Primary | ICD-10-CM

## 2018-08-20 ENCOUNTER — OFFICE VISIT (OUTPATIENT)
Dept: UROLOGY | Facility: CLINIC | Age: 73
End: 2018-08-20
Payer: COMMERCIAL

## 2018-08-20 VITALS
DIASTOLIC BLOOD PRESSURE: 80 MMHG | SYSTOLIC BLOOD PRESSURE: 130 MMHG | HEIGHT: 69 IN | BODY MASS INDEX: 29.62 KG/M2 | WEIGHT: 200 LBS

## 2018-08-20 DIAGNOSIS — R33.9 URINARY RETENTION WITH INCOMPLETE BLADDER EMPTYING: Primary | ICD-10-CM

## 2018-08-20 DIAGNOSIS — N13.8 BPH WITH OBSTRUCTION/LOWER URINARY TRACT SYMPTOMS: ICD-10-CM

## 2018-08-20 DIAGNOSIS — R33.9 URINARY RETENTION: Primary | ICD-10-CM

## 2018-08-20 DIAGNOSIS — N40.1 BPH WITH OBSTRUCTION/LOWER URINARY TRACT SYMPTOMS: ICD-10-CM

## 2018-08-20 PROCEDURE — 52000 CYSTOURETHROSCOPY: CPT | Performed by: UROLOGY

## 2018-08-20 RX ORDER — CEFAZOLIN SODIUM 1 G
1 VIAL (EA) INJECTION SEE ADMIN INSTRUCTIONS
Status: CANCELLED | OUTPATIENT
Start: 2018-08-20 | End: 2019-08-20

## 2018-08-20 RX ORDER — CIPROFLOXACIN 500 MG/1
500 TABLET, FILM COATED ORAL ONCE
Qty: 1 TABLET | Refills: 0 | Status: SHIPPED | OUTPATIENT
Start: 2018-08-20 | End: 2018-08-20

## 2018-08-20 ASSESSMENT — PAIN SCALES - GENERAL: PAINLEVEL: NO PAIN (0)

## 2018-08-20 NOTE — LETTER
8/20/2018       RE: Sam Barry  [address] [facility] [address]     Dear Colleague,    Thank you for referring your patient, Sam Barry, to the [facility] [place] at [facility]. Please see a copy of my visit note below.    This very pleasant 73-year-old gentleman returns again for cystoscopy.  He has had problems with repeated episodes of urinary retention over the last few months and once again, after voiding well for a time after previous catheterization developed retention again and a catheter was reinserted.  He apparently also was quite constipated at the time.  The patient is also on tamsulosin at this time, the patient has been taking oxybutynin  intermittently.  He also had a recent urinary tract infection has been treated.  He is not on blood thinning medication except for cod liver oil.     Procedure.  Cystoscopy.   Surgeon.    Tray  Anesthesia.  Local anesthesia.  Description.  With the patient in the supine position, with the genital area prepped and draped in the customary fashion, with local anesthetic and the urethra, the flexible cystoscope was Inserted.  After removal of the catheter the flexible cystoscope was inserted.  The penile urethra appears unremarkable.  The external sphincter is intact.  Into the abdomen time is clearly considerable enlargement of the lateral lobes of the prostate which meet in the midline.  When viewed retrospectively there is a large median lobe extending out into the bladder covering much of the trigone.  There is evidence of inflammation from the presence of catheter in the bladder.  There is grade 1-2 trabeculation noted in the bladder via  There is no evidence of neoplasm or stone in the bladder.  There were no other remarkable features.    Impression.  After the procedure I had a careful discussion with both the patient and his wife.  In my opinion, despite  "the periods between catheterization when he seemed to void quite well, he has had repeated episodes of urinary retention, has a large prostate and a large prostatic median lobe and it seems likely that these issues will continue despite treatment with tamsulosin.  In addition, he has been taking oxybutynin which probably has not helped.  However, the patient is clearly not wishing to run into any further problems with retention and is concerned that this could happen again.  In my opinion he should now have a transurethral resection prostate to relieve obstruction.  This procedure I carefully discussed with the patient in detail today.  We also talked about alternatives including laser TURP and because of the large median lobe I would favor a traditional transurethral resection is more appropriate in this instance.  I've advised him he will likely spend overnight in the hospital and go home the next day, the catheter may be removed from the bladder the day after surgery or it might go home with a catheter in for a few days to have it removed in the office.  He will have to avoid any lifting beyond 10 pounds for about 4 weeks after the surgery until the prostatic urethra seal satisfactorily to prevent any further bleeding.  He may sustain issues related to retrograde ejaculation after the surgery.  I carefully discussed the entire situation and the procedure in detail with the patient and his wife.  I answered all questions.    Plan cystoscopy with transurethral resection of prostate.    Time.  25 minutes was spent in addition to the procedure in order to have a careful review of the record a careful discussion about the findings and to go over very carefully the therapeutic options difficulty considered and discussed in detail transurethral resection of prostate, Pramod is performed, the potential risks associated with it and the associated benefits, side effects and consultations.    \"This dictation was performed " "with voice recognition software and may contain errors,  omissions and inadvertent word substitution.\"    Again, thank you for allowing me to participate in the care of your patient.      Sincerely,    Fredy Feliz MD      "

## 2018-08-20 NOTE — NURSING NOTE
Chief Complaint   Patient presents with     Cystoscopy     Pt is here for cystoscopy due to urinary retention.    Prior to the start of the procedure and with procedural staff participation, I verbally confirmed the patient s identity using two indicators, relevant allergies, that the procedure was appropriate and matched the consent or emergent situation, and that the correct equipment/implants were available. Immediately prior to starting the procedure I conducted the Time Out with the procedural staff and re-confirmed the patient s name, procedure, and site/side. I have wiped the patient off with the povidone-Iodine solution, draped them,  used Lidocaine hydrochloride jelly, and instilled sterile water into the bladder. (The Joint Commission universal protocol was followed.)  Yes    Sedation (Moderate or Deep): None  Leida Palma CMA

## 2018-08-20 NOTE — MR AVS SNAPSHOT
"              After Visit Summary   8/20/2018    Sam Barry    MRN: [id number]           Patient Information     Date Of Birth          1945        Visit Information        Provider Department      8/20/2018 4:00 PM Fredy Feliz MD; [facility] [place]        Care Instructions         AFTER YOUR CYSTOSCOPY         You have just completed a cystoscopy, or \"cysto\", which allowed your physician to learn more about your bladder (or to remove a stent placed after surgery). We suggest that you continue to avoid caffeine, fruit juice, and alcohol for the next 24 hours, however, you are encouraged to return to your normal activities.       A few things that are considered normal after your cystoscopy:    * small amount of bleeding (or spotting) that clears within the next 24 hours    * slight burning sensation with urination    * sensation to of needing to avoid more frequently    * the feeling of \"air\" in your urine    * mild discomfort that is relieved with Tylonol        Please contact our office promptly if you:    * develop a fever above 101 degrees    * are unable to urinate    * develop bright red blood that does not stop    * severe pain or swelling        And of course, please contact our office with any concerns or questions [phone]              Follow-ups after your visit        Future tests that were ordered for you today     Open Future Orders        Priority Expected Expires Ordered    UA without Microscopic Routine  8/20/2019 8/20/2018            Who to contact     If you have questions or need follow up information about today's clinic visit or your schedule please contact [facility]OLOG[facility] directly at [phone].  Normal or non-critical lab and imaging results will be communicated to you by MyChart, letter or phone within 4 business days after the clinic has received the results. If you do not hear from us " "within 7 days, please contact the clinic through [facility] or phone. If you have a critical or abnormal lab result, we will notify you by phone as soon as possible.  Submit refill requests through [facility] [employer] [facility] or call your pharmacy and they will forward the refill request to us. Please allow 3 business days for your refill to be completed.          Additional Information About Your Visit        [employer]aze Information     Me[employer] You lets you send messages to your doctor, view your test results, renew your prescriptions, schedule appointments and more. To sign up, go to [email or web address] . Click on \"Log in\" on the left side of the screen, which will take you to the Welcome page. Then click on \"Sign up Now\" on the right side of the page.     You will be asked to enter the access code listed below, as well as some personal information. Please follow the directions to create your username and password.     Your access code is: [email or web address]  Expires: 10/5/2018  9:34 AM     Your access code will  in 90 days. If you need help or a new code, please call your [place] clinic or [phone].        Care EveryWhere ID     This is your Care EveryWhere ID. This could be used by other organizations to access your [place] medical records  [id number]        Your Vitals Were     Height BMI (Body Mass Index)                1.753 m (5' 9\") 29.53 kg/m2           Blood Pressure from Last 3 Encounters:   18 130/80   18 (!) 149/101   18 (!) 150/100    Weight from Last 3 Encounters:   18 90.7 kg (200 lb)   18 90.7 kg (200 lb)   18 81.6 kg (180 lb)              Today, you had the following     No orders found for display         Today's Medication Changes          These changes are accurate as of 18  5:13 PM.  If you have any questions, ask your nurse or doctor.               Start taking these medicines.        Dose/Directions    ciprofloxacin 500 MG tablet   Commonly known as:  CIPRO   Used for:  " Urinary retention   Started by:  Fredy Feliz MD        Dose:  500 mg   Take 1 tablet (500 mg) by mouth once for 1 dose   Quantity:  1 tablet   Refills:  0            Where to get your medicines      These medications were sent to [facility] [place], [place] - [address]  [address] [place] [address]     Phone:  [phone]     ciprofloxacin 500 MG tablet                Primary Care Provider Office Phone # Fax #    [facility] [place] [phone]       [address] [place] [address]        Equal Access to Services     [facility] CenterEST[facility] : Hadii samira jennings hadasho Soomaali, waaxda luqadaha, qaybta kaalmada adejalynyawhitney, alejandro murray . So [facility] [phone].    ATENCIÓN: Si habla español, tiene a de leon disposición servicios gratuitos de asistencia lingüística. [facility] [phone].    We comply with applicable federal civil rights laws and [place] laws. We do not discriminate on the basis of race, color, national origin, age, disability, sex, sexual orientation, or gender identity.            Thank you!     Thank you for choosing [facility] [place]  for your care. Our goal is always to provide you with excellent care. Hearing back from our patients is one way we can continue to improve our services. Please take a few minutes to complete the written survey that you may receive in the mail after your visit with us. Thank you!             Your Updated Medication List - Protect others around you: Learn how to safely use, store and throw away your medicines at www.disposemymeds.org.          This list is accurate as of 8/20/18  5:13 PM.  Always use your most recent med list.                   Brand Name Dispense Instructions for use Diagnosis    ciprofloxacin 500 MG tablet    CIPRO    1 tablet    Take 1 tablet (500 mg) by mouth once for 1 dose    Urinary retention       CIPROFLOXACIN PO           cod liver  oil Caps capsule      Take 1 capsule by mouth        D3-50 41482 units capsule   Generic drug:  cholecalciferol      Take 50,000 Units by mouth        MULTI-VITAMINS Tabs      Take 1 tablet by mouth        oxybutynin 5 MG 24 hr tablet    DITROPAN XL    7 tablet    Take 1 tablet (5 mg) by mouth daily    Benign prostatic hyperplasia with urinary retention       sennosides 8.6 MG tablet    SENOKOT    20 tablet    Take 1-3 tablets by mouth daily as needed for constipation    Constipation, unspecified constipation type       tamsulosin 0.4 MG capsule    FLOMAX    90 capsule    Take 1 capsule (0.4 mg) by mouth daily    Urinary retention

## 2018-08-20 NOTE — PROGRESS NOTES
This very pleasant 73-year-old gentleman returns again for cystoscopy.  He has had problems with repeated episodes of urinary retention over the last few months and once again, after voiding well for a time after previous catheterization developed retention again and a catheter was reinserted.  He apparently also was quite constipated at the time.  The patient is also on tamsulosin at this time, the patient has been taking oxybutynin  intermittently.  He also had a recent urinary tract infection has been treated.  He is not on blood thinning medication except for cod liver oil.     Procedure.  Cystoscopy.   Surgeon.    Tray  Anesthesia.  Local anesthesia.  Description.  With the patient in the supine position, with the genital area prepped and draped in the customary fashion, with local anesthetic and the urethra, the flexible cystoscope was Inserted.  After removal of the catheter the flexible cystoscope was inserted.  The penile urethra appears unremarkable.  The external sphincter is intact.  Into the abdomen time is clearly considerable enlargement of the lateral lobes of the prostate which meet in the midline.  When viewed retrospectively there is a large median lobe extending out into the bladder covering much of the trigone.  There is evidence of inflammation from the presence of catheter in the bladder.  There is grade 1-2 trabeculation noted in the bladder via  There is no evidence of neoplasm or stone in the bladder.  There were no other remarkable features.    Impression.  After the procedure I had a careful discussion with both the patient and his wife.  In my opinion, despite the periods between catheterization when he seemed to void quite well, he has had repeated episodes of urinary retention, has a large prostate and a large prostatic median lobe and it seems likely that these issues will continue despite treatment with tamsulosin.  In addition, he has been taking oxybutynin which probably has not  "helped.  However, the patient is clearly not wishing to run into any further problems with retention and is concerned that this could happen again.  In my opinion he should now have a transurethral resection prostate to relieve obstruction.  This procedure I carefully discussed with the patient in detail today.  We also talked about alternatives including laser TURP and because of the large median lobe I would favor a traditional transurethral resection is more appropriate in this instance.  I've advised him he will likely spend overnight in the hospital and go home the next day, the catheter may be removed from the bladder the day after surgery or it might go home with a catheter in for a few days to have it removed in the office.  He will have to avoid any lifting beyond 10 pounds for about 4 weeks after the surgery until the prostatic urethra seal satisfactorily to prevent any further bleeding.  He may sustain issues related to retrograde ejaculation after the surgery.  I carefully discussed the entire situation and the procedure in detail with the patient and his wife.  I answered all questions.    Plan cystoscopy with transurethral resection of prostate.    Time.  25 minutes was spent in addition to the procedure in order to have a careful review of the record a careful discussion about the findings and to go over very carefully the therapeutic options difficulty considered and discussed in detail transurethral resection of prostate, Pramod is performed, the potential risks associated with it and the associated benefits, side effects and consultations.    \"This dictation was performed with voice recognition software and may contain errors,  omissions and inadvertent word substitution.\"    "

## 2018-08-20 NOTE — PATIENT INSTRUCTIONS

## 2018-09-18 ENCOUNTER — HOSPITAL ENCOUNTER (OUTPATIENT)
Facility: CLINIC | Age: 73
Discharge: HOME | End: 2018-09-19
Attending: UROLOGY | Admitting: UROLOGY
Payer: COMMERCIAL

## 2018-09-18 ENCOUNTER — ANESTHESIA (OUTPATIENT)
Dept: SURGERY | Facility: CLINIC | Age: 73
End: 2018-09-18
Payer: COMMERCIAL

## 2018-09-18 ENCOUNTER — SURGERY (OUTPATIENT)
Age: 73
End: 2018-09-18

## 2018-09-18 ENCOUNTER — ANESTHESIA EVENT (OUTPATIENT)
Dept: SURGERY | Facility: CLINIC | Age: 73
End: 2018-09-18
Payer: COMMERCIAL

## 2018-09-18 DIAGNOSIS — R33.9 URINARY RETENTION: ICD-10-CM

## 2018-09-18 LAB
ABO + RH BLD: NORMAL
ABO + RH BLD: NORMAL
BLD GP AB SCN SERPL QL: NORMAL
BLOOD BANK CMNT PATIENT-IMP: NORMAL
SPECIMEN EXP DATE BLD: NORMAL

## 2018-09-18 PROCEDURE — 25000125 ZZHC RX 250: Performed by: UROLOGY

## 2018-09-18 PROCEDURE — 88305 TISSUE EXAM BY PATHOLOGIST: CPT | Performed by: UROLOGY

## 2018-09-18 PROCEDURE — 37000009 ZZH ANESTHESIA TECHNICAL FEE, EACH ADDTL 15 MIN: Performed by: UROLOGY

## 2018-09-18 PROCEDURE — 25000128 H RX IP 250 OP 636: Performed by: NURSE ANESTHETIST, CERTIFIED REGISTERED

## 2018-09-18 PROCEDURE — 40000170 ZZH STATISTIC PRE-PROCEDURE ASSESSMENT II: Performed by: UROLOGY

## 2018-09-18 PROCEDURE — 86850 RBC ANTIBODY SCREEN: CPT | Performed by: UROLOGY

## 2018-09-18 PROCEDURE — 86900 BLOOD TYPING SEROLOGIC ABO: CPT | Performed by: UROLOGY

## 2018-09-18 PROCEDURE — 25000566 ZZH SEVOFLURANE, EA 15 MIN: Performed by: UROLOGY

## 2018-09-18 PROCEDURE — 36415 COLL VENOUS BLD VENIPUNCTURE: CPT | Performed by: UROLOGY

## 2018-09-18 PROCEDURE — 25000128 H RX IP 250 OP 636: Performed by: UROLOGY

## 2018-09-18 PROCEDURE — 71000012 ZZH RECOVERY PHASE 1 LEVEL 1 FIRST HR: Performed by: UROLOGY

## 2018-09-18 PROCEDURE — 36000058 ZZH SURGERY LEVEL 3 EA 15 ADDTL MIN: Performed by: UROLOGY

## 2018-09-18 PROCEDURE — 88305 TISSUE EXAM BY PATHOLOGIST: CPT | Mod: 26 | Performed by: UROLOGY

## 2018-09-18 PROCEDURE — 27210794 ZZH OR GENERAL SUPPLY STERILE: Performed by: UROLOGY

## 2018-09-18 PROCEDURE — 25000128 H RX IP 250 OP 636: Performed by: ANESTHESIOLOGY

## 2018-09-18 PROCEDURE — 25000125 ZZHC RX 250: Performed by: NURSE ANESTHETIST, CERTIFIED REGISTERED

## 2018-09-18 PROCEDURE — 25800025 ZZH RX 258: Performed by: UROLOGY

## 2018-09-18 PROCEDURE — 36000056 ZZH SURGERY LEVEL 3 1ST 30 MIN: Performed by: UROLOGY

## 2018-09-18 PROCEDURE — 86901 BLOOD TYPING SEROLOGIC RH(D): CPT | Performed by: UROLOGY

## 2018-09-18 PROCEDURE — 37000008 ZZH ANESTHESIA TECHNICAL FEE, 1ST 30 MIN: Performed by: UROLOGY

## 2018-09-18 PROCEDURE — 52601 PROSTATECTOMY (TURP): CPT | Performed by: UROLOGY

## 2018-09-18 RX ORDER — NALOXONE HYDROCHLORIDE 0.4 MG/ML
.1-.4 INJECTION, SOLUTION INTRAMUSCULAR; INTRAVENOUS; SUBCUTANEOUS
Status: DISCONTINUED | OUTPATIENT
Start: 2018-09-18 | End: 2018-09-19 | Stop reason: HOSPADM

## 2018-09-18 RX ORDER — SODIUM CHLORIDE, SODIUM LACTATE, POTASSIUM CHLORIDE, CALCIUM CHLORIDE 600; 310; 30; 20 MG/100ML; MG/100ML; MG/100ML; MG/100ML
INJECTION, SOLUTION INTRAVENOUS CONTINUOUS
Status: DISCONTINUED | OUTPATIENT
Start: 2018-09-18 | End: 2018-09-19 | Stop reason: HOSPADM

## 2018-09-18 RX ORDER — CEFAZOLIN SODIUM 2 G/100ML
2 INJECTION, SOLUTION INTRAVENOUS
Status: COMPLETED | OUTPATIENT
Start: 2018-09-18 | End: 2018-09-18

## 2018-09-18 RX ORDER — GLYCINE 1.5 G/100ML
IRRIGANT IRRIGATION PRN
Status: DISCONTINUED | OUTPATIENT
Start: 2018-09-18 | End: 2018-09-18 | Stop reason: HOSPADM

## 2018-09-18 RX ORDER — SODIUM CHLORIDE, SODIUM LACTATE, POTASSIUM CHLORIDE, CALCIUM CHLORIDE 600; 310; 30; 20 MG/100ML; MG/100ML; MG/100ML; MG/100ML
INJECTION, SOLUTION INTRAVENOUS CONTINUOUS
Status: DISCONTINUED | OUTPATIENT
Start: 2018-09-18 | End: 2018-09-18 | Stop reason: HOSPADM

## 2018-09-18 RX ORDER — ONDANSETRON 4 MG/1
4 TABLET, ORALLY DISINTEGRATING ORAL EVERY 30 MIN PRN
Status: DISCONTINUED | OUTPATIENT
Start: 2018-09-18 | End: 2018-09-18 | Stop reason: HOSPADM

## 2018-09-18 RX ORDER — FENTANYL CITRATE 50 UG/ML
25-50 INJECTION, SOLUTION INTRAMUSCULAR; INTRAVENOUS
Status: DISCONTINUED | OUTPATIENT
Start: 2018-09-18 | End: 2018-09-18 | Stop reason: HOSPADM

## 2018-09-18 RX ORDER — FENTANYL CITRATE 50 UG/ML
INJECTION, SOLUTION INTRAMUSCULAR; INTRAVENOUS PRN
Status: DISCONTINUED | OUTPATIENT
Start: 2018-09-18 | End: 2018-09-18

## 2018-09-18 RX ORDER — ONDANSETRON 4 MG/1
4 TABLET, ORALLY DISINTEGRATING ORAL EVERY 6 HOURS PRN
Status: DISCONTINUED | OUTPATIENT
Start: 2018-09-18 | End: 2018-09-19 | Stop reason: HOSPADM

## 2018-09-18 RX ORDER — LIDOCAINE HYDROCHLORIDE 20 MG/ML
INJECTION, SOLUTION INFILTRATION; PERINEURAL PRN
Status: DISCONTINUED | OUTPATIENT
Start: 2018-09-18 | End: 2018-09-18

## 2018-09-18 RX ORDER — NALOXONE HYDROCHLORIDE 0.4 MG/ML
.1-.4 INJECTION, SOLUTION INTRAMUSCULAR; INTRAVENOUS; SUBCUTANEOUS
Status: DISCONTINUED | OUTPATIENT
Start: 2018-09-18 | End: 2018-09-18 | Stop reason: HOSPADM

## 2018-09-18 RX ORDER — ACETAMINOPHEN 325 MG/1
650 TABLET ORAL EVERY 4 HOURS PRN
Qty: 100 TABLET | Refills: 0 | Status: SHIPPED | OUTPATIENT
Start: 2018-09-18 | End: 2023-02-09

## 2018-09-18 RX ORDER — ONDANSETRON 2 MG/ML
INJECTION INTRAMUSCULAR; INTRAVENOUS PRN
Status: DISCONTINUED | OUTPATIENT
Start: 2018-09-18 | End: 2018-09-18

## 2018-09-18 RX ORDER — DEXAMETHASONE SODIUM PHOSPHATE 4 MG/ML
INJECTION, SOLUTION INTRA-ARTICULAR; INTRALESIONAL; INTRAMUSCULAR; INTRAVENOUS; SOFT TISSUE PRN
Status: DISCONTINUED | OUTPATIENT
Start: 2018-09-18 | End: 2018-09-18

## 2018-09-18 RX ORDER — SODIUM CHLORIDE, SODIUM LACTATE, POTASSIUM CHLORIDE, CALCIUM CHLORIDE 600; 310; 30; 20 MG/100ML; MG/100ML; MG/100ML; MG/100ML
INJECTION, SOLUTION INTRAVENOUS CONTINUOUS PRN
Status: DISCONTINUED | OUTPATIENT
Start: 2018-09-18 | End: 2018-09-18

## 2018-09-18 RX ORDER — NEOMYCIN/BACITRACIN/POLYMYXINB 3.5-400-5K
OINTMENT (GRAM) TOPICAL 4 TIMES DAILY PRN
Status: DISCONTINUED | OUTPATIENT
Start: 2018-09-18 | End: 2018-09-19 | Stop reason: HOSPADM

## 2018-09-18 RX ORDER — PROPOFOL 10 MG/ML
INJECTION, EMULSION INTRAVENOUS PRN
Status: DISCONTINUED | OUTPATIENT
Start: 2018-09-18 | End: 2018-09-18

## 2018-09-18 RX ORDER — ONDANSETRON 2 MG/ML
4 INJECTION INTRAMUSCULAR; INTRAVENOUS EVERY 6 HOURS PRN
Status: DISCONTINUED | OUTPATIENT
Start: 2018-09-18 | End: 2018-09-19 | Stop reason: HOSPADM

## 2018-09-18 RX ORDER — ONDANSETRON 2 MG/ML
4 INJECTION INTRAMUSCULAR; INTRAVENOUS EVERY 30 MIN PRN
Status: DISCONTINUED | OUTPATIENT
Start: 2018-09-18 | End: 2018-09-18 | Stop reason: HOSPADM

## 2018-09-18 RX ORDER — HYDROMORPHONE HYDROCHLORIDE 1 MG/ML
.3-.5 INJECTION, SOLUTION INTRAMUSCULAR; INTRAVENOUS; SUBCUTANEOUS EVERY 10 MIN PRN
Status: DISCONTINUED | OUTPATIENT
Start: 2018-09-18 | End: 2018-09-18 | Stop reason: HOSPADM

## 2018-09-18 RX ORDER — CEFAZOLIN SODIUM 1 G/3ML
1 INJECTION, POWDER, FOR SOLUTION INTRAMUSCULAR; INTRAVENOUS SEE ADMIN INSTRUCTIONS
Status: DISCONTINUED | OUTPATIENT
Start: 2018-09-18 | End: 2018-09-18 | Stop reason: HOSPADM

## 2018-09-18 RX ORDER — OXYCODONE AND ACETAMINOPHEN 5; 325 MG/1; MG/1
1-2 TABLET ORAL EVERY 4 HOURS PRN
Status: DISCONTINUED | OUTPATIENT
Start: 2018-09-18 | End: 2018-09-19 | Stop reason: HOSPADM

## 2018-09-18 RX ORDER — MEPERIDINE HYDROCHLORIDE 25 MG/ML
12.5 INJECTION INTRAMUSCULAR; INTRAVENOUS; SUBCUTANEOUS
Status: DISCONTINUED | OUTPATIENT
Start: 2018-09-18 | End: 2018-09-18 | Stop reason: HOSPADM

## 2018-09-18 RX ORDER — LIDOCAINE 40 MG/G
CREAM TOPICAL
Status: DISCONTINUED | OUTPATIENT
Start: 2018-09-18 | End: 2018-09-19 | Stop reason: HOSPADM

## 2018-09-18 RX ORDER — HYDROMORPHONE HYDROCHLORIDE 1 MG/ML
0.2 INJECTION, SOLUTION INTRAMUSCULAR; INTRAVENOUS; SUBCUTANEOUS
Status: DISCONTINUED | OUTPATIENT
Start: 2018-09-18 | End: 2018-09-19 | Stop reason: HOSPADM

## 2018-09-18 RX ADMIN — SODIUM CHLORIDE, POTASSIUM CHLORIDE, SODIUM LACTATE AND CALCIUM CHLORIDE: 600; 310; 30; 20 INJECTION, SOLUTION INTRAVENOUS at 11:24

## 2018-09-18 RX ADMIN — CEFAZOLIN SODIUM 2 G: 2 INJECTION, SOLUTION INTRAVENOUS at 08:58

## 2018-09-18 RX ADMIN — SODIUM CHLORIDE, POTASSIUM CHLORIDE, SODIUM LACTATE AND CALCIUM CHLORIDE: 600; 310; 30; 20 INJECTION, SOLUTION INTRAVENOUS at 08:47

## 2018-09-18 RX ADMIN — DEXMEDETOMIDINE HYDROCHLORIDE 8 MCG: 100 INJECTION, SOLUTION INTRAVENOUS at 09:24

## 2018-09-18 RX ADMIN — DEXMEDETOMIDINE HYDROCHLORIDE 12 MCG: 100 INJECTION, SOLUTION INTRAVENOUS at 09:45

## 2018-09-18 RX ADMIN — DEXMEDETOMIDINE HYDROCHLORIDE 8 MCG: 100 INJECTION, SOLUTION INTRAVENOUS at 09:55

## 2018-09-18 RX ADMIN — MIDAZOLAM 2 MG: 1 INJECTION INTRAMUSCULAR; INTRAVENOUS at 08:48

## 2018-09-18 RX ADMIN — GLYCINE 18000 ML: 1.5 SOLUTION IRRIGATION at 09:23

## 2018-09-18 RX ADMIN — PROPOFOL 200 MG: 10 INJECTION, EMULSION INTRAVENOUS at 08:56

## 2018-09-18 RX ADMIN — ATROPA BELLADONNA AND OPIUM 30 MG: 16.2; 3 SUPPOSITORY RECTAL at 10:06

## 2018-09-18 RX ADMIN — FENTANYL CITRATE 50 MCG: 50 INJECTION, SOLUTION INTRAMUSCULAR; INTRAVENOUS at 08:56

## 2018-09-18 RX ADMIN — ONDANSETRON 4 MG: 2 INJECTION INTRAMUSCULAR; INTRAVENOUS at 09:35

## 2018-09-18 RX ADMIN — DEXAMETHASONE SODIUM PHOSPHATE 4 MG: 4 INJECTION, SOLUTION INTRA-ARTICULAR; INTRALESIONAL; INTRAMUSCULAR; INTRAVENOUS; SOFT TISSUE at 09:00

## 2018-09-18 RX ADMIN — SODIUM CHLORIDE, POTASSIUM CHLORIDE, SODIUM LACTATE AND CALCIUM CHLORIDE: 600; 310; 30; 20 INJECTION, SOLUTION INTRAVENOUS at 12:11

## 2018-09-18 RX ADMIN — LIDOCAINE HYDROCHLORIDE 60 MG: 20 INJECTION, SOLUTION INFILTRATION; PERINEURAL at 08:56

## 2018-09-18 RX ADMIN — FENTANYL CITRATE 50 MCG: 50 INJECTION, SOLUTION INTRAMUSCULAR; INTRAVENOUS at 09:14

## 2018-09-18 RX ADMIN — SODIUM CHLORIDE 3000 ML: 900 IRRIGANT IRRIGATION at 12:15

## 2018-09-18 ASSESSMENT — ENCOUNTER SYMPTOMS: DYSRHYTHMIAS: 0

## 2018-09-18 ASSESSMENT — LIFESTYLE VARIABLES: TOBACCO_USE: 0

## 2018-09-18 NOTE — ANESTHESIA POSTPROCEDURE EVALUATION
Patient: Sam Barry    Procedure(s):  CYSTOSCOPY, TRANSURETHRAL RESECTION OF THE PROSTATE.    EBL: 30mL - Wound Class: II-Clean Contaminated    Diagnosis:URINARY RETENSION, BPH  Diagnosis Additional Information: No value filed.    Anesthesia Type:  General, LMA    Note:  Anesthesia Post Evaluation    Patient location during evaluation: PACU  Patient participation: Able to fully participate in evaluation  Level of consciousness: awake  Pain management: adequate  Cardiovascular status: acceptable  Respiratory status: acceptable  Hydration status: acceptable  PONV: none     Anesthetic complications: None          Last vitals:  Vitals:    09/18/18 1130 09/18/18 1145 09/18/18 1206   BP: (!) 138/98 (!) 140/92 (!) 155/101   Resp: 12 12 14   Temp: 36.3  C (97.4  F)  35.1  C (95.1  F)   SpO2: 99% 98% 97%         Electronically Signed By: RICH DE LA ROSA  September 18, 2018  3:52 PM

## 2018-09-18 NOTE — OP NOTE
Procedure Date: 09/18/2018      PREOPERATIVE DIAGNOSIS:  Urinary retention with benign prostatic hyperplasia.        POSTOPERATIVE DIAGNOSIS:  Urinary retention with benign prostatic hyperplasia.      PROCEDURE:  Cystoscopy, transurethral resection of prostate.      INDICATIONS:  This very pleasant 73-year-old gentleman has a history of urinary retention and during an investigation was found to have an extended large median lobe prostatic lobe extending into the bladder.      SURGEON:  Fredy Feliz MD      ANESTHESIA:  General.      DESCRIPTION OF PROCEDURE:  The patient was brought to the operative suite and after induction of anesthesia was placed in the dorsal lithotomy position with genitalia prepped and draped in customary fashion.  Timeout was then called.  A 24-[place] Islander cystoscope was then carefully inserted into the penile urethra.  The penile skin was normal.  The external sphincter was intact when viewed from verumontanum.  There was significant enlargement of the lateral lobes of the prostate and a significant median lobe extending out over the trigone of the bladder.  I carefully inspected the bladder.  There was no evidence of neoplasm or stone in the bladder with grade 2 trabeculation identified with some small saccules also seen.  Ureteric orifices were situated fairly close to the bladder neck, but there were no other remarkable features.  I then withdrew the cystoscope, gently dilated the urethra and bladder neck from 24 to 28 [place] Islander with curved Quinn sounds and the 26-[place] Islander continuous flow resectoscope with the obturator in place and was carefully passed through the urethra into the bladder.  The obturator was removed and the resecting element inserted in its place.        After identification of the ureteric orifice and verumontanum, I began the resection with resection of the median lobe tissue from 5 o'clock to 7 o'clock and then continued with resection of all tissue between bladder neck  and verumontanum beginning with the remainder of the median lobe between 5 o'clock and 7 o'clock, continuing with the patient's right lateral lobe from 7 through 11 o'clock, the patient's left lateral lobe from 5 to 1 o'clock and completing the resection with resection of the anterior lobe tissue between 11 o'clock and 1 o'clock.  All bleeding points were carefully coagulated.  All fragments were evacuated from the bladder with the Urovac.  At the end of the procedure, I carefully inspected the prostatic fossa, was very satisfied with the appearance, I decided that we did not need to do an incision of the bladder neck at 6 o'clock.  I was able to identify the ureteric orifices, which had not been damaged.  The resectoscope was then removed.  A 24-Irish 3-way Cardenas catheter was passed through the urethra into the bladder with the aid of a curved catheter guide.  The catheter guide removed, the balloon inflated with about 40 mL of fluid.  Continuous bladder irrigation was commenced.  The bladder was irrigated with a catheter tip syringe for 4 flushes.  The drainage was then very clear, very gentle traction was placed on the catheter.  A 30 mg B&O suppository was placed in the rectum.  The patient was taken to the recovery room and tolerated the procedure well.      CONCLUSION AND PLAN:  The patient will avoid any aspirin for the next 4 weeks.  The catheter may very well come out tomorrow morning.  We will do a trial of voiding tomorrow if the situation looks satisfactory.  Otherwise, he will go home with a catheter in for about 2-3 days and then come to the office for removal in the office.  Subsequently, I will see him in the office in 4 weeks to review the pathology report, evaluate his progress and do a bladder scan.         MICHAELA HSU MD             D: 2018   T: 2018   MT: JOSEPHINE      Name:     HEIDY REDMAN   MRN:      [id number]        Account:        [id number]   :      1945            Procedure Date: 09/18/2018      Document: [id number]       cc: [facility]        Fredy Feliz MD

## 2018-09-18 NOTE — PROGRESS NOTES
Admission medication history interview status for the 9/18/2018  admission is complete. See EPIC admission navigator for prior to admission medications     Medication history source reliability:Good    Medication history interview source(s):Patient    Medication history resources (including written lists, pill bottles, clinic record):None    Primary pharmacy. [facility]    Additional medication history information not noted on PTA med list :None    Time spent in this activity: 45 minutes    Prior to Admission medications    Medication Sig Last Dose Taking? Auth Provider   Aspirin-Salicylamide-Caffeine (BC HEADACHE PO) Take 1-2 tablets by mouth daily as needed more than a week at unknown Yes Reported, Patient   cod liver oil CAPS capsule Take 1 capsule by mouth more than a week at unknown Yes Reported, Patient   Multiple Vitamin (MULTI-VITAMINS) TABS Take 1 tablet by mouth daily  more than a week at unknown Yes Reported, Patient   sennosides (SENOKOT) 8.6 MG tablet Take 1-3 tablets by mouth daily as needed for constipation more than a week at unknown Yes Barbara Varghese PA-C   VITAMIN D, CHOLECALCIFEROL, PO Take 1 tablet by mouth daily more than a week at unknown Yes Reported, Patient

## 2018-09-18 NOTE — IP AVS SNAPSHOT
[address], [address]    [facility] [address]    Phone:  [phone]                                       After Visit Summary   9/18/2018    Sam Barry    MRN: [id number]           After Visit Summary Signature Page     I have received my discharge instructions, and my questions have been answered. I have discussed any challenges I see with this plan with the nurse or doctor.    ..........................................................................................................................................  Patient/Patient Representative Signature      ..........................................................................................................................................  Patient Representative Print Name and Relationship to Patient    ..................................................               ................................................  Date                                   Time    ..........................................................................................................................................  Reviewed by Signature/Title    ...................................................              ..............................................  Date                                               Time          22EPIC Rev 08/18

## 2018-09-18 NOTE — ANESTHESIA CARE TRANSFER NOTE
Patient: Sam Barry    Procedure(s):  CYSTOSCOPY, TRANSURETHRAL RESECTION OF THE PROSTATE.    EBL: 30mL - Wound Class: II-Clean Contaminated    Diagnosis: URINARY RETENSION, BPH  Diagnosis Additional Information: No value filed.    Anesthesia Type:   General, LMA     Note:  Airway :Face Mask  Patient transferred to:PACU  Comments: Pt exhibits spontaneous respirations, follows commands, suctioned, LMA removed, exchanging well, transferred to pacu with O2 @ 10L via mask, all monitors and alarms on, report to RN, VSS.Handoff Report: Identifed the Patient, Identified the Reponsible Provider, Reviewed the pertinent medical history, Discussed the surgical course, Reviewed Intra-OP anesthesia mangement and issues during anesthesia, Set expectations for post-procedure period and Allowed opportunity for questions and acknowledgement of understanding      Vitals: (Last set prior to Anesthesia Care Transfer)    CRNA VITALS  9/18/2018 0945 - 9/18/2018 1021      9/18/2018             Pulse: 71    SpO2: 100 %    Resp Rate (observed): 8                Electronically Signed By: ANGELA Luong CRNA  September 18, 2018  10:21 AM

## 2018-09-18 NOTE — IP AVS SNAPSHOT
MRN:[id number]                      After Visit Summary   9/18/2018    Sam Barry    MRN: [id number]           Thank you!     Thank you for choosing [facility] for your care. Our goal is always to provide you with excellent care. Hearing back from our patients is one way we can continue to improve our services. Please take a few minutes to complete the written survey that you may receive in the mail after you visit with us. Thank you!        Patient Information     Date Of Birth          1945        Designated Caregiver       Most Recent Value    Caregiver    Will someone help with your care after discharge? yes    Name of designated caregiver WIFEOscar GOVEA    Phone number of caregiver [phone]    Caregiver address [address] [address]      About your hospital stay     You were admitted on:  September 18, 2018 You last received care in the:  [place] [address] Oncology    You were discharged on:  September 19, 2018        Reason for your hospital stay       Surgery                  Who to Call     For medical emergencies, please call 911.  For non-urgent questions about your medical care, please call your primary care provider or clinic, [phone]  For questions related to your surgery, please call your surgery clinic        Attending Provider     Provider Specialty    Fredy Feliz MD Urology       Primary Care Provider Office Phone # Fax #    [facility] [phone]       When to contact your care team       Fevers, chills, nausea, vomiting, uncontrolled pain, passing large blood clots.   [phone]                  After Care Instructions     Activity       Your activity upon discharge: No lifting >10 lbs until your post op appt.            Diet       Follow this diet upon discharge: Regular            No lifting        No lifting over 10 lbs and no strenuous physical activity for 4 weeks                  Follow-up  "Appointments     Follow-up and recommended labs and tests       Dr. Feliz 10/15/18 at 10am.  No aspirin or fish oil for 4 weeks.                  Your next 10 appointments already scheduled     Oct 15, 2018 10:00 AM CDT   Return Visit with Fredy Feliz MD   [facility] [place] ([facility])    [address]  [address]  [facility] [address]   [phone]              Pending Results     Date and Time Order Name Status Description    2018 0928 Surgical pathology exam In process             Statement of Approval     Ordered          18 1118  I have reviewed and agree with all the recommendations and orders detailed in this document.  EFFECTIVE NOW     Approved and electronically signed by:  Barbara Varghese PA-C             Admission Information     Date & Time Provider Department Dept. Phone    2018 Fredy Feliz MD [place] [address] Oncology [phone]      Your Vitals Were     Blood Pressure Temperature Respirations Height Weight Pulse Oximetry    137/89 (BP Location: Right arm) 97.1  F (36.2  C) (Oral) 16 1.753 m (5' 9\") 94.3 kg (208 lb) 96%    BMI (Body Mass Index)                   30.72 kg/m2           MyChart Information     [employer] lets you send messages to your doctor, view your test results, renew your prescriptions, schedule appointments and more. To sign up, go to [email or web address] . Click on \"Log in\" on the left side of the screen, which will take you to the Welcome page. Then click on \"Sign up Now\" on the right side of the page.     You will be asked to enter the access code listed below, as well as some personal information. Please follow the directions to create your username and password.     Your access code is: [email or web address]  Expires: 10/5/2018  9:34 AM     Your access code will  in 90 days. If you need help or a new code, please call your [facility] or [phone].        Care EveryWhere " ID     This is your Care EveryWhere ID. This could be used by other organizations to access your [facility] medical records  [id number]        Equal Access to Services     RAI VITALE : Hadii aad ku hadgraysonmontrell Sammi, alyse alissonabimbola, nilda hanley, alejandro Dempsey Redwood [phone].    ATENCIÓN: Si habla español, tiene a de leon disposición servicios gratuitos de asistencia lingüística. Llame al [phone].    We comply with applicable federal civil rights laws and [place] laws. We do not discriminate on the basis of race, color, national origin, age, disability, sex, sexual orientation, or gender identity.               Review of your medicines      START taking        Dose / Directions    acetaminophen 325 MG tablet   Commonly known as:  TYLENOL        Dose:  650 mg   Take 2 tablets (650 mg) by mouth every 4 hours as needed for other (mild pain)   Quantity:  100 tablet   Refills:  0         CONTINUE these medicines which have NOT CHANGED        Dose / Directions    MULTI-VITAMINS Tabs        Dose:  1 tablet   Take 1 tablet by mouth daily   Refills:  0       sennosides 8.6 MG tablet   Commonly known as:  SENOKOT   Used for:  Constipation, unspecified constipation type        Dose:  1-3 tablet   Take 1-3 tablets by mouth daily as needed for constipation   Quantity:  20 tablet   Refills:  0       VITAMIN D (CHOLECALCIFEROL) PO        Dose:  1 tablet   Take 1 tablet by mouth daily   Refills:  0         STOP taking     BC HEADACHE PO           cod liver oil Caps capsule                Where to get your medicines      These medications were sent to [facility] - [address]  [address], [place] [address]     Phone:  [phone]     acetaminophen 325 MG tablet                Protect others around you: Learn how to safely use, store and throw away your medicines at www.disposemymeds.org.             Medication List: This is a list of  all your medications and when to take them. Check marks below indicate your daily home schedule. Keep this list as a reference.      Medications           Morning Afternoon Evening Bedtime As Needed    acetaminophen 325 MG tablet   Commonly known as:  TYLENOL   Take 2 tablets (650 mg) by mouth every 4 hours as needed for other (mild pain)                                MULTI-VITAMINS Tabs   Take 1 tablet by mouth daily                                sennosides 8.6 MG tablet   Commonly known as:  SENOKOT   Take 1-3 tablets by mouth daily as needed for constipation                                VITAMIN D (CHOLECALCIFEROL) PO   Take 1 tablet by mouth daily                                          More Information        Transurethral Resection of the Prostate (TURP): Home Recovery    Take it easy for the first month or so while you heal after transurethral resection of the prostate. During the first few weeks, you may feel burning when you pass urine. You may also feel like you have to urinate often. These sensations will go away. If your urine becomes bright red, it means that the treated area is bleeding. This may happen on and off for a month or so after a TURP. If this occurs, rest and drink plenty of fluids until the bleeding stops.  While you are healing  To help prevent problems during the first month after your surgery, follow these tips:    Drink plenty of fluids.    Avoid strenuous exercise.    Don t lift anything over 10 pounds.    Avoid sexual activity and strenuous exercise.    Avoid straining at stool. If you are constipated, take stool softeners or laxatives for a few days.    Talk to your doctor about when you can return to work.    Ask your doctor when you can start driving again.    Don t sit for more than 60 minutes without getting up.    Check with your doctor before taking over-the-counter pain relievers. These include aspirin, ibuprofen, and naproxen.  Follow-up care  You will visit your doctor  to make sure you are healing without problems. If tests were done on your prostate tissue your doctor will discuss the results with you.     When to call your healthcare provider  Call your healthcare provider right away if any of these occur:    You re not able to urinate, or notice a decrease in urine flow    You have a fever of 100.4 F (38 C) or higher, or as directed by your doctor    You have severe pain that is not relieved by prescription pain medicine    You have bleeding that doesn t stop within 12 hours    You have bleeding with clots, or blood plugs up the catheter. (A little blood in the urine is normal.)    The catheter falls out   Getting back to sex  BPH and its treatments rarely cause problems with sex. Even if you have retrograde ejaculation, your erection or orgasm shouldn t feel any different than it used to. Retrograde ejaculation can result in infertility, as the semen will not come out of the penis. If you notice any problems with sex, talk to your doctor. Help may be available.  Trouble controlling your urine  Some men will have trouble controlling their urine (urinary incontinence) after this surgery. This may last for a few days, weeks, or months, but it will improve with time. You may also pass your urine more often (urinary frequency), like you did before the surgery. This will also improve as you start to heal.  Date Last Reviewed: 1/1/2017 2000-2017 [employer]. [address] [place] [address], [place] [address]. All rights reserved. This information is not intended as a substitute for professional medical care. Always follow your healthcare professional's instructions.

## 2018-09-18 NOTE — PLAN OF CARE
Problem: Patient Care Overview  Goal: Plan of Care/Patient Progress Review  Arrived up to the unit around 1215. POD0 cysto and turp. A&Ox4. VSS on 2L O2. Denies pain. CBI running at slow/moderate rate with clear light red output. BS hypoactive and no flatus. Full liquid diet currently not hunger. IVF infusing 100 ml/hr.

## 2018-09-18 NOTE — ANESTHESIA PREPROCEDURE EVALUATION
Anesthesia Evaluation     . Pt has had prior anesthetic.     No history of anesthetic complications          ROS/MED HX    ENT/Pulmonary:      (-) tobacco use, asthma and sleep apnea   Neurologic:     (+)other neuro mild cognitive impairment    Cardiovascular:     (+) Dyslipidemia, ----. : . . . :. .      (-) arrhythmias, irregular heartbeat/palpitations and valvular problems/murmurs   METS/Exercise Tolerance:     Hematologic:         Musculoskeletal:         GI/Hepatic:        (-) GERD   Renal/Genitourinary:         Endo:         Psychiatric:         Infectious Disease:         Malignancy:         Other:                     Physical Exam  Normal systems: cardiovascular and pulmonary    Airway   Mallampati: II  TM distance: >3 FB  Neck ROM: full    Dental   Comment: native    Cardiovascular       Pulmonary                     Anesthesia Plan      History & Physical Review  History and physical reviewed and following examination; no interval change.    ASA Status:  1 .    NPO Status:  > 8 hours    Plan for General and LMA with Propofol induction. Maintenance will be Balanced.    PONV prophylaxis:  Ondansetron (or other 5HT-3) and Dexamethasone or Solumedrol       Postoperative Care      Consents  Anesthetic plan, risks, benefits and alternatives discussed with:  Patient..                          .

## 2018-09-19 VITALS
TEMPERATURE: 97.1 F | RESPIRATION RATE: 16 BRPM | BODY MASS INDEX: 30.81 KG/M2 | WEIGHT: 208 LBS | OXYGEN SATURATION: 96 % | DIASTOLIC BLOOD PRESSURE: 89 MMHG | HEIGHT: 69 IN | SYSTOLIC BLOOD PRESSURE: 137 MMHG

## 2018-09-19 LAB
COPATH REPORT: NORMAL
ERYTHROCYTE [DISTWIDTH] IN BLOOD BY AUTOMATED COUNT: 11.9 % (ref 10–15)
HCT VFR BLD AUTO: 39.7 % (ref 40–53)
HGB BLD-MCNC: 13.7 G/DL (ref 13.3–17.7)
MCH RBC QN AUTO: 30 PG (ref 26.5–33)
MCHC RBC AUTO-ENTMCNC: 34.5 G/DL (ref 31.5–36.5)
MCV RBC AUTO: 87 FL (ref 78–100)
PLATELET # BLD AUTO: 210 10E9/L (ref 150–450)
RBC # BLD AUTO: 4.57 10E12/L (ref 4.4–5.9)
WBC # BLD AUTO: 9.9 10E9/L (ref 4–11)

## 2018-09-19 PROCEDURE — 85027 COMPLETE CBC AUTOMATED: CPT | Performed by: UROLOGY

## 2018-09-19 PROCEDURE — 36415 COLL VENOUS BLD VENIPUNCTURE: CPT | Performed by: UROLOGY

## 2018-09-19 NOTE — DISCHARGE SUMMARY
[facility] Discharge Summary: Urology    Sam Barry MRN# [id number]   Age: 73 year old YOB: 1945     Date of Admission:  9/18/2018  Date of Discharge::  9/19/2018  Admitting Physician:  Fredy Feliz MD  Discharge Physician:  Barbara Varghese PA-C, LARRY           Admission Diagnoses:   Urinary retention          Discharge Diagnosis:   Same          Procedures:   TURP          Medications Prior to Admission:     Prescriptions Prior to Admission   Medication Sig Dispense Refill Last Dose     Multiple Vitamin (MULTI-VITAMINS) TABS Take 1 tablet by mouth daily    more than a week at unknown     sennosides (SENOKOT) 8.6 MG tablet Take 1-3 tablets by mouth daily as needed for constipation 20 tablet 0 more than a week at unknown     VITAMIN D, CHOLECALCIFEROL, PO Take 1 tablet by mouth daily   more than a week at unknown     [DISCONTINUED] Aspirin-Salicylamide-Caffeine (BC HEADACHE PO) Take 1-2 tablets by mouth daily as needed   more than a week at unknown     [DISCONTINUED] cod liver oil CAPS capsule Take 1 capsule by mouth   more than a week at unknown             Discharge Medications:     Current Discharge Medication List      START taking these medications    Details   acetaminophen (TYLENOL) 325 MG tablet Take 2 tablets (650 mg) by mouth every 4 hours as needed for other (mild pain)  Qty: 100 tablet, Refills: 0    Associated Diagnoses: Urinary retention         CONTINUE these medications which have NOT CHANGED    Details   Multiple Vitamin (MULTI-VITAMINS) TABS Take 1 tablet by mouth daily       sennosides (SENOKOT) 8.6 MG tablet Take 1-3 tablets by mouth daily as needed for constipation  Qty: 20 tablet, Refills: 0    Associated Diagnoses: Constipation, unspecified constipation type      VITAMIN D, CHOLECALCIFEROL, PO Take 1 tablet by mouth daily         STOP taking these medications       Aspirin-Salicylamide-Caffeine (BC HEADACHE PO) Comments:   Reason for Stopping:          cod liver oil CAPS capsule Comments:   Reason for Stopping:                     Consultations:     None          Hospital Course:     The patient underwent the above procedure and tolerated this well. Uncomplicated post operative course. Had adequate pain control, ambulating and tolerating regular diet at the time of discharge. Should remain office ASA and fish oil x 4 weeks.            Discharge Instructions and Follow-Up:   Discharge diet: Regular   Discharge activity: No lifting >10lbs or strenuous exercise for 4 week(s)   Discharge follow-up: Dr. Feliz in 4 weeks               Discharge Disposition:   Discharged to home        Barbara Varghese PA-C  [facility]

## 2018-09-19 NOTE — PLAN OF CARE
Problem: Patient Care Overview  Goal: Plan of Care/Patient Progress Review  Outcome: Improving  Pt A&Ox4. Up with 1, ambulated x1. VSS, tachy at times. Denies pain. Weaned to RA, sating well. On Capno. Tolerating full liquid diet, good appetite. Cardenas output cherry colored- CBI running slow/moderate, irrigated x1 with no clots. BS low pitched (-) flatus/BM. PIV SL'd since tolerating PO intake. IS education completed. Dc possibly tomorrow.

## 2018-09-19 NOTE — PROGRESS NOTES
Patient discharged at 12:38 PM to home.  IV was discontinued.  Belongings returned to patient.  Discharge instructions and medications reviewed with patient.  Patient verbalized understanding and all questions were answered.  Prescriptions given to patient.  At time of discharge, patient condition was stable and left the unit escorted by step force

## 2018-09-19 NOTE — PROGRESS NOTES
[facility] Urology Progress Note          Assessment and Plan:   Active Problems:    Urinary retention    Assessment: POD 1 TURP    Plan: IS, ambulate  TOV in 1 hour if urine remains clear. Check PVR, if >300cc need to check a follow up PVR.   Dispo: Hopeful for home later today.       Barbara Varghese PA-C  [facility]  [phone]                 Interval History:   doing well; no cp, sob, n/v/d, or abd pain. Cardenas clear, CBI clamped              Review of Systems:   The 5 point Review of Systems is negative other than noted in the HPI             Medications:     Current Facility-Administered Medications Ordered in Epic   Medication Dose Route Frequency Last Rate Last Dose     HYDROmorphone (PF) (DILAUDID) injection 0.2 mg  0.2 mg Intravenous Q2H PRN         lactated ringers infusion   Intravenous Continuous 100 mL/hr at 09/18/18 1211       lidocaine (LMX4) cream   Topical Q1H PRN         lidocaine 1 % 1 mL  1 mL Other Q1H PRN         naloxone (NARCAN) injection 0.1-0.4 mg  0.1-0.4 mg Intravenous Q2 Min PRN         neomycin-bacitracin-polymyxin (NEOSPORIN) ointment   Topical 4x Daily PRN         ondansetron (ZOFRAN-ODT) ODT tab 4 mg  4 mg Oral Q6H PRN        Or     ondansetron (ZOFRAN) injection 4 mg  4 mg Intravenous Q6H PRN         opium-belladonna (B&O SUPPRETTES) 30-16.2 MG per suppository 1 suppository  30 mg Rectal TID PRN         oxyCODONE-acetaminophen (PERCOCET) 5-325 MG per tablet 1-2 tablet  1-2 tablet Oral Q4H PRN         sodium chloride (PF) 0.9% PF flush 3 mL  3 mL Intracatheter Q1H PRN         sodium chloride (PF) 0.9% PF flush 3 mL  3 mL Intracatheter Q8H   3 mL at 09/19/18 0757     sodium chloride 0.9% (bag) irrigation   Irrigation Continuous   3,000 mL at 09/18/18 1215     Current Outpatient Prescriptions Ordered in Epic   Medication     acetaminophen (TYLENOL) 325 MG tablet                  Physical Exam:   Vitals were reviewed  Patient Vitals for the past 8 hrs:   BP Temp  Temp src Heart Rate Resp SpO2   09/19/18 0725 137/89 97.1  F (36.2  C) Oral 76 16 96 %     GEN: NAD, lying in bed  HEENT: EOMI  NECK: Supple  ABD: Obese, soft  EXT: No LE edema  : Cardenas Clear, CBI clamped           Data:   No results found for: NTBNPI, NTBNP  Lab Results   Component Value Date    WBC 9.9 09/19/2018    WBC 7.9 03/15/2018    WBC 9.7 12/01/2014    HGB 13.7 09/19/2018    HGB 12.9 (L) 03/15/2018    HGB 13.3 12/01/2014    HCT 39.7 (L) 09/19/2018    HCT 36.6 (L) 03/15/2018    HCT 38.9 (L) 12/01/2014    MCV 87 09/19/2018    MCV 87 03/15/2018    MCV 89 12/01/2014     09/19/2018     03/15/2018     12/01/2014     No results found for: INR

## 2018-09-19 NOTE — PLAN OF CARE
Problem: Patient Care Overview  Goal: Plan of Care/Patient Progress Review  Outcome: No Change   Pt is A&Ox4. VSS on RA. On capno, WDL. Denies any pain. Up A1, ambulated x1 last evening. Tolerating full liquid diet, can likely be advanced to low fiber in AM. CBI running at slow rate, pink colored output. No clots noted. BS low pitched (-) flatus/BM. PIV SL'd since tolerating PO intake. Plan for possible discharge today pending CBI and TOV. Will continue to monitor.

## 2018-10-12 DIAGNOSIS — R33.9 URINARY RETENTION: Primary | ICD-10-CM

## 2018-10-15 ENCOUNTER — OFFICE VISIT (OUTPATIENT)
Dept: UROLOGY | Facility: CLINIC | Age: 73
End: 2018-10-15
Payer: COMMERCIAL

## 2018-10-15 VITALS
HEIGHT: 69 IN | SYSTOLIC BLOOD PRESSURE: 128 MMHG | WEIGHT: 208 LBS | BODY MASS INDEX: 30.81 KG/M2 | DIASTOLIC BLOOD PRESSURE: 88 MMHG

## 2018-10-15 DIAGNOSIS — N40.1 BPH WITH URINARY OBSTRUCTION: ICD-10-CM

## 2018-10-15 DIAGNOSIS — R33.9 URINARY RETENTION: Primary | ICD-10-CM

## 2018-10-15 DIAGNOSIS — N13.8 BPH WITH URINARY OBSTRUCTION: ICD-10-CM

## 2018-10-15 PROCEDURE — 99024 POSTOP FOLLOW-UP VISIT: CPT | Performed by: UROLOGY

## 2018-10-15 ASSESSMENT — PAIN SCALES - GENERAL: PAINLEVEL: NO PAIN (0)

## 2018-10-15 NOTE — MR AVS SNAPSHOT
"              After Visit Summary   10/15/2018    Sam Barry    MRN: [id number]           Patient Information     Date Of Birth          1945        Visit Information        Provider Department      10/15/2018 10:00 AM Fredy Feliz MD [facility] [place]        Today's Diagnoses     Urinary retention    -  1    BPH with urinary obstruction           Follow-ups after your visit        Follow-up notes from your care team     Return if symptoms worsen or fail to improve.      Who to contact     If you have questions or need follow up information about today's clinic visit or your schedule please contact [facility] [place] directly at [phone].  Normal or non-critical lab and imaging results will be communicated to you by MyChart, letter or phone within 4 business days after the clinic has received the results. If you do not hear from us within 7 days, please contact the clinic through [employer] [facility] or phone. If you have a critical or abnormal lab result, we will notify you by phone as soon as possible.  Submit refill requests through [employer] or call your pharmacy and they will forward the refill request to us. Please allow 3 business days for your refill to be completed.          Additional Information About Your Visit        MyChart Information     [employer] lets you send messages to your doctor, view your test results, renew your prescriptions, schedule appointments and more. To sign up, go to [email or web address] . Click on \"Log in\" on the left side of the screen, which will take you to the Welcome page. Then click on \"Sign up Now\" on the right side of the page.     You will be asked to enter the access code listed below, as well as some personal information. Please follow the directions to create your username and password.     Your access code is: [email or web address]  Expires: 2019 10:29 AM     Your access code will  in 90 " "days. If you need help or a new code, please call your [place] clinic or [phone].        Care EveryWhere ID     This is your Care EveryWhere ID. This could be used by other organizations to access your [place] medical records  [id number]        Your Vitals Were     Height BMI (Body Mass Index)                1.753 m (5' 9\") 30.72 kg/m2           Blood Pressure from Last 3 Encounters:   10/15/18 128/88   09/19/18 137/89   08/20/18 130/80    Weight from Last 3 Encounters:   10/15/18 94.3 kg (208 lb)   09/18/18 94.3 kg (208 lb)   08/20/18 90.7 kg (200 lb)              Today, you had the following     No orders found for display       Primary Care Provider Office Phone # Fax #    [facility] [phone]       [address] [place] [address]        Equal Access to Services     RAI VITALE : Hadii samira ku hadasho Soomaali, waaxda luqadaha, qaybta kaalmada adeegyada, alejandro cabanin lokesh murray . So [facility] [phone].    ATENCIÓN: Si eaglela espkala, tiene a de leon disposición servicios gratuitos de asistencia lingüística. Llame al [phone].    We comply with applicable federal civil rights laws and [place] laws. We do not discriminate on the basis of race, color, national origin, age, disability, sex, sexual orientation, or gender identity.            Thank you!     Thank you for choosing [facility] [place]  for your care. Our goal is always to provide you with excellent care. Hearing back from our patients is one way we can continue to improve our services. Please take a few minutes to complete the written survey that you may receive in the mail after your visit with us. Thank you!             Your Updated Medication List - Protect others around you: Learn how to safely use, store and throw away your medicines at www.disposemymeds.org.          This list is accurate as of 10/15/18 10:29 AM.  Always use your most recent med list.    "                Brand Name Dispense Instructions for use Diagnosis    acetaminophen 325 MG tablet    TYLENOL    100 tablet    Take 2 tablets (650 mg) by mouth every 4 hours as needed for other (mild pain)    Urinary retention       MULTI-VITAMINS Tabs      Take 1 tablet by mouth daily        sennosides 8.6 MG tablet    SENOKOT    20 tablet    Take 1-3 tablets by mouth daily as needed for constipation    Constipation, unspecified constipation type       VITAMIN D (CHOLECALCIFEROL) PO      Take 1 tablet by mouth daily

## 2018-10-15 NOTE — LETTER
10/15/2018       RE: Sam Barry  [address] [address]     Dear Colleague,    Thank you for referring your patient, Sam Barry, to the [facility] [place] at [facility]. Please see a copy of my visit note below.    This gentleman had a transurethral resection of the prostate 4 weeks ago.  As we recall he had an extended median lobe of the bladder with a history of urinary retention.  I we have now got a transurethral resection of the prostate from which he is made an excellent recovery.  He has a very good urine stream.  Pathology of the resected tissue was all benign.  I discussed the situation with him today.  I do not need to see him on a regular basis at this point.  However should gross hematuria or difficulty with stream recur in the future I would need to see him promptly.  I did discuss this carefully with him in detail today.  I answered all his questions    Plan.  I will see him on a as needed basis.    Postoperative visit    Again, thank you for allowing me to participate in the care of your patient.      Sincerely,    Fredy Feliz MD

## 2020-03-19 NOTE — ED NOTES
Pt had clark catheter exchanged for leg bag and given night bag for home. Pt educated on catheter care.   
ACP

## 2020-08-13 NOTE — PROGRESS NOTES
This gentleman had a transurethral resection of the prostate 4 weeks ago.  As we recall he had an extended median lobe of the bladder with a history of urinary retention.  I we have now got a transurethral resection of the prostate from which he is made an excellent recovery.  He has a very good urine stream.  Pathology of the resected tissue was all benign.  I discussed the situation with him today.  I do not need to see him on a regular basis at this point.  However should gross hematuria or difficulty with stream recur in the future I would need to see him promptly.  I did discuss this carefully with him in detail today.  I answered all his questions    Plan.  I will see him on a as needed basis.    Postoperative visit  
No

## 2020-09-21 ENCOUNTER — APPOINTMENT (OUTPATIENT)
Dept: CT IMAGING | Facility: CLINIC | Age: 75
End: 2020-09-21
Attending: EMERGENCY MEDICINE
Payer: COMMERCIAL

## 2020-09-21 ENCOUNTER — APPOINTMENT (OUTPATIENT)
Dept: GENERAL RADIOLOGY | Facility: CLINIC | Age: 75
End: 2020-09-21
Attending: EMERGENCY MEDICINE
Payer: COMMERCIAL

## 2020-09-21 ENCOUNTER — HOSPITAL ENCOUNTER (EMERGENCY)
Facility: CLINIC | Age: 75
Discharge: LEFT BEFORE DISCHARGE | End: 2020-09-21
Attending: EMERGENCY MEDICINE | Admitting: EMERGENCY MEDICINE
Payer: COMMERCIAL

## 2020-09-21 ENCOUNTER — HOSPITAL ENCOUNTER (EMERGENCY)
Facility: CLINIC | Age: 75
Discharge: HOME | End: 2020-09-21
Attending: EMERGENCY MEDICINE | Admitting: EMERGENCY MEDICINE
Payer: COMMERCIAL

## 2020-09-21 VITALS
BODY MASS INDEX: 30.27 KG/M2 | DIASTOLIC BLOOD PRESSURE: 102 MMHG | SYSTOLIC BLOOD PRESSURE: 167 MMHG | OXYGEN SATURATION: 100 % | RESPIRATION RATE: 20 BRPM | HEART RATE: 129 BPM | TEMPERATURE: 99 F | WEIGHT: 205 LBS

## 2020-09-21 DIAGNOSIS — Z53.29 LEFT AGAINST MEDICAL ADVICE: ICD-10-CM

## 2020-09-21 DIAGNOSIS — R00.0 TACHYCARDIA: ICD-10-CM

## 2020-09-21 DIAGNOSIS — R41.3 MEMORY IMPAIRMENT: ICD-10-CM

## 2020-09-21 LAB
ANION GAP SERPL CALCULATED.3IONS-SCNC: 7 MMOL/L (ref 3–14)
BASOPHILS # BLD AUTO: 0 10E9/L (ref 0–0.2)
BASOPHILS NFR BLD AUTO: 0.2 %
BUN SERPL-MCNC: 15 MG/DL (ref 7–30)
CALCIUM SERPL-MCNC: 9.6 MG/DL (ref 8.5–10.1)
CHLORIDE SERPL-SCNC: 108 MMOL/L (ref 94–109)
CO2 SERPL-SCNC: 26 MMOL/L (ref 20–32)
CREAT SERPL-MCNC: 1.09 MG/DL (ref 0.66–1.25)
DIFFERENTIAL METHOD BLD: ABNORMAL
EOSINOPHIL # BLD AUTO: 0 10E9/L (ref 0–0.7)
EOSINOPHIL NFR BLD AUTO: 0 %
ERYTHROCYTE [DISTWIDTH] IN BLOOD BY AUTOMATED COUNT: 11.9 % (ref 10–15)
ETHANOL SERPL-MCNC: <0.01 G/DL
GFR SERPL CREATININE-BSD FRML MDRD: 66 ML/MIN/{1.73_M2}
GLUCOSE SERPL-MCNC: 116 MG/DL (ref 70–99)
HCT VFR BLD AUTO: 42 % (ref 40–53)
HGB BLD-MCNC: 14.7 G/DL (ref 13.3–17.7)
IMM GRANULOCYTES # BLD: 0 10E9/L (ref 0–0.4)
IMM GRANULOCYTES NFR BLD: 0.1 %
INTERPRETATION ECG - MUSE: NORMAL
LYMPHOCYTES # BLD AUTO: 1.4 10E9/L (ref 0.8–5.3)
LYMPHOCYTES NFR BLD AUTO: 13.2 %
MCH RBC QN AUTO: 31.3 PG (ref 26.5–33)
MCHC RBC AUTO-ENTMCNC: 35 G/DL (ref 31.5–36.5)
MCV RBC AUTO: 89 FL (ref 78–100)
MONOCYTES # BLD AUTO: 0.5 10E9/L (ref 0–1.3)
MONOCYTES NFR BLD AUTO: 5.2 %
NEUTROPHILS # BLD AUTO: 8.5 10E9/L (ref 1.6–8.3)
NEUTROPHILS NFR BLD AUTO: 81.3 %
NRBC # BLD AUTO: 0 10*3/UL
NRBC BLD AUTO-RTO: 0 /100
PLATELET # BLD AUTO: 255 10E9/L (ref 150–450)
POTASSIUM SERPL-SCNC: 3.6 MMOL/L (ref 3.4–5.3)
RBC # BLD AUTO: 4.7 10E12/L (ref 4.4–5.9)
SODIUM SERPL-SCNC: 141 MMOL/L (ref 133–144)
TSH SERPL DL<=0.005 MIU/L-ACNC: 0.71 MU/L (ref 0.4–4)
WBC # BLD AUTO: 10.5 10E9/L (ref 4–11)

## 2020-09-21 PROCEDURE — 80048 BASIC METABOLIC PNL TOTAL CA: CPT | Performed by: EMERGENCY MEDICINE

## 2020-09-21 PROCEDURE — 73502 X-RAY EXAM HIP UNI 2-3 VIEWS: CPT

## 2020-09-21 PROCEDURE — 99285 EMERGENCY DEPT VISIT HI MDM: CPT | Mod: 25

## 2020-09-21 PROCEDURE — 70450 CT HEAD/BRAIN W/O DYE: CPT

## 2020-09-21 PROCEDURE — 80320 DRUG SCREEN QUANTALCOHOLS: CPT | Performed by: EMERGENCY MEDICINE

## 2020-09-21 PROCEDURE — 85025 COMPLETE CBC W/AUTO DIFF WBC: CPT | Performed by: EMERGENCY MEDICINE

## 2020-09-21 PROCEDURE — 99282 EMERGENCY DEPT VISIT SF MDM: CPT

## 2020-09-21 PROCEDURE — 84443 ASSAY THYROID STIM HORMONE: CPT | Performed by: EMERGENCY MEDICINE

## 2020-09-21 PROCEDURE — 90791 PSYCH DIAGNOSTIC EVALUATION: CPT

## 2020-09-21 RX ORDER — WATER 10 ML/10ML
INJECTION INTRAMUSCULAR; INTRAVENOUS; SUBCUTANEOUS
Status: DISCONTINUED
Start: 2020-09-21 | End: 2020-09-22 | Stop reason: HOSPADM

## 2020-09-21 RX ORDER — OLANZAPINE 10 MG/2ML
5 INJECTION, POWDER, FOR SOLUTION INTRAMUSCULAR ONCE
Status: DISCONTINUED | OUTPATIENT
Start: 2020-09-21 | End: 2020-09-22 | Stop reason: HOSPADM

## 2020-09-21 ASSESSMENT — ENCOUNTER SYMPTOMS
SHORTNESS OF BREATH: 0
MYALGIAS: 0
BACK PAIN: 0
ARTHRALGIAS: 0
NECK PAIN: 0
DIZZINESS: 0
LIGHT-HEADEDNESS: 0

## 2020-09-21 NOTE — ED AVS SNAPSHOT
Emergency Department  [address] [facility] [address]  Phone:  [phone]  Fax:  [phone]                                    Sam Barry   MRN: [id number]    Department:   Emergency Department   Date of Visit:  9/21/2020           After Visit Summary Signature Page    I have received my discharge instructions, and my questions have been answered. I have discussed any challenges I see with this plan with the nurse or doctor.    ..........................................................................................................................................  Patient/Patient Representative Signature      ..........................................................................................................................................  Patient Representative Print Name and Relationship to Patient    ..................................................               ................................................  Date                                   Time    ..........................................................................................................................................  Reviewed by Signature/Title    ...................................................              ..............................................  Date                                               Time          22EPIC Rev 08/18
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
## 2020-09-21 NOTE — ED TRIAGE NOTES
I got a call from security that there was an elderly man in the parking ramp who was sitting on the curb in the lower level and appeared confused and unable to stand up on his own. I wheeled him back to triage desk and had him register. I appears he was seen in the ED earlier today and left AMA after telling Dr Kruger that he makes his own decisions and insisted upon leaving. Nobody answered the phone numbers listed for family/spouse.

## 2020-09-21 NOTE — ED NOTES
Bed: ED12  Expected date: 9/21/20  Expected time: 2:30 PM  Means of arrival: Ambulance  Comments:  451 75m fall eta now

## 2020-09-21 NOTE — DISCHARGE INSTRUCTIONS
It was recommended that you stay in the emergency department for further eval to figure out why you felt dizzy, lightheaded and fell and to ensure that you do not have any injuries from falling.  You have declined this and I recommended blood work, electrical picture of your heart, IV fluids to hydrate you and continued monitoring.  You were able to walk with a steady gait and declined work-up and were able to state that there is risk of injury or death.

## 2020-09-21 NOTE — ED TRIAGE NOTES
Pt was found wandering in employee parking ramp. Pt was unable to tell where he was and why. Pt just left ama from ED today.

## 2020-09-21 NOTE — ED NOTES
RN asked permission to obtain labs from patient. Pt continues to refuse at this time. MD notified.

## 2020-09-21 NOTE — ED NOTES
"EDT was asked to ambulate patient, upon EDT entering room to do so, pt yelled: \"THIS IS AN ABDUCTION\". When pt informed that EDT needed to see him walk, pt responded: \"the only walking you'll see is me walking out of this shit hole.\"    MD informed of patient refusal.      Bird Hernandez, EDT  "
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
## 2020-09-21 NOTE — ED NOTES
"Pt refusing blood work and urine at this time. Pt states, \"I was just walking back from [employer] after eating a burger and I tripped and fell. The  stopped by to help me up and then brought me to the hospital. I don't want to be here, I just want to go home. There's nothing wrong with me right now.\"  "

## 2020-09-21 NOTE — ED TRIAGE NOTES
"Pt presents by EMS after sustaining a fall. Pt reports he was walking home, \"was spinning by the bush\" and fell onto R side per EMS. EMS arrived and pt was refusing to get up and was complaining of R elbow and R leg pain. Now, pt complaining of L leg pain. Pt denies hitting head, denies LOC. No blood thinners.   "
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
## 2020-09-22 NOTE — ED NOTES
Attempted to call wife, Natalia, multiple times at each different number listed in patient chart. Unable to reach any family members at this time.
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
## 2020-09-22 NOTE — DISCHARGE INSTRUCTIONS
I would not leave Sam home alone.  He is proved tonight that his memory issues are enough that he is unsafe to be left alone.  I do recommend following up with his primary doctor for repeat evaluation and I would consider having him tested for dementia.

## 2021-07-01 NOTE — ED NOTES
Patient refused to stay in emergency department and refused evaluation by staff. Patient walked out to front of hospital to leave. No hold is on this patient. [facility] and aware of [facility] leaving AMA. No AMA paperwork signed.    no

## 2022-03-08 NOTE — NURSING NOTE
Chief Complaint   Patient presents with     RECHECK     Patient is here for follow up. He is taking cipro right now. He said that he believes that he addressed the retention issue at his last appointment.      Jose Dodd LPN 8:20 AM July 18, 2018      
No
10-Apr-2021

## 2023-02-09 ENCOUNTER — APPOINTMENT (OUTPATIENT)
Dept: MRI IMAGING | Facility: CLINIC | Age: 78
DRG: 689 | End: 2023-02-09
Attending: PHYSICIAN ASSISTANT
Payer: COMMERCIAL

## 2023-02-09 ENCOUNTER — APPOINTMENT (OUTPATIENT)
Dept: CT IMAGING | Facility: CLINIC | Age: 78
DRG: 689 | End: 2023-02-09
Attending: EMERGENCY MEDICINE
Payer: COMMERCIAL

## 2023-02-09 ENCOUNTER — HOSPITAL ENCOUNTER (INPATIENT)
Facility: CLINIC | Age: 78
LOS: 11 days | Discharge: HOME | DRG: 689 | End: 2023-02-20
Attending: EMERGENCY MEDICINE | Admitting: STUDENT IN AN ORGANIZED HEALTH CARE EDUCATION/TRAINING PROGRAM
Payer: COMMERCIAL

## 2023-02-09 ENCOUNTER — HOSPITAL ENCOUNTER (INPATIENT)
Dept: NEUROLOGY | Facility: CLINIC | Age: 78
Discharge: HOME | DRG: 689 | End: 2023-02-09
Attending: STUDENT IN AN ORGANIZED HEALTH CARE EDUCATION/TRAINING PROGRAM
Payer: COMMERCIAL

## 2023-02-09 DIAGNOSIS — G40.909 SEIZURE DISORDER (H): ICD-10-CM

## 2023-02-09 DIAGNOSIS — M62.830 BACK MUSCLE SPASM: Primary | ICD-10-CM

## 2023-02-09 DIAGNOSIS — R33.9 URINARY RETENTION: ICD-10-CM

## 2023-02-09 LAB
ANION GAP SERPL CALCULATED.3IONS-SCNC: 20 MMOL/L (ref 7–15)
APTT PPP: 33 SECONDS (ref 22–38)
ATRIAL RATE - MUSE: 84 BPM
BASOPHILS # BLD AUTO: 0.1 10E3/UL (ref 0–0.2)
BASOPHILS NFR BLD AUTO: 1 %
BUN SERPL-MCNC: 10.1 MG/DL (ref 8–23)
CALCIUM SERPL-MCNC: 9.9 MG/DL (ref 8.8–10.2)
CHLORIDE SERPL-SCNC: 98 MMOL/L (ref 98–107)
CREAT SERPL-MCNC: 1.09 MG/DL (ref 0.67–1.17)
DEPRECATED HCO3 PLAS-SCNC: 20 MMOL/L (ref 22–29)
DIASTOLIC BLOOD PRESSURE - MUSE: NORMAL MMHG
EOSINOPHIL # BLD AUTO: 0.1 10E3/UL (ref 0–0.7)
EOSINOPHIL NFR BLD AUTO: 1 %
ERYTHROCYTE [DISTWIDTH] IN BLOOD BY AUTOMATED COUNT: 11.3 % (ref 10–15)
GFR SERPL CREATININE-BSD FRML MDRD: 70 ML/MIN/1.73M2
GLUCOSE SERPL-MCNC: 159 MG/DL (ref 70–99)
HCO3 BLDV-SCNC: 28 MMOL/L (ref 21–28)
HCT VFR BLD AUTO: 41.3 % (ref 40–53)
HGB BLD-MCNC: 13.8 G/DL (ref 13.3–17.7)
HOLD SPECIMEN: NORMAL
IMM GRANULOCYTES # BLD: 0.2 10E3/UL
IMM GRANULOCYTES NFR BLD: 2 %
INR PPP: 1.17 (ref 0.85–1.15)
INTERPRETATION ECG - MUSE: NORMAL
LACTATE BLD-SCNC: 3.8 MMOL/L
LACTATE SERPL-SCNC: 2.7 MMOL/L (ref 0.7–2)
LYMPHOCYTES # BLD AUTO: 3 10E3/UL (ref 0.8–5.3)
LYMPHOCYTES NFR BLD AUTO: 27 %
MCH RBC QN AUTO: 30.2 PG (ref 26.5–33)
MCHC RBC AUTO-ENTMCNC: 33.4 G/DL (ref 31.5–36.5)
MCV RBC AUTO: 90 FL (ref 78–100)
MONOCYTES # BLD AUTO: 0.5 10E3/UL (ref 0–1.3)
MONOCYTES NFR BLD AUTO: 4 %
NEUTROPHILS # BLD AUTO: 7.3 10E3/UL (ref 1.6–8.3)
NEUTROPHILS NFR BLD AUTO: 65 %
NRBC # BLD AUTO: 0 10E3/UL
NRBC BLD AUTO-RTO: 0 /100
P AXIS - MUSE: 49 DEGREES
PCO2 BLDV: 54 MM HG (ref 40–50)
PH BLDV: 7.33 [PH] (ref 7.32–7.43)
PLATELET # BLD AUTO: 277 10E3/UL (ref 150–450)
PO2 BLDV: 22 MM HG (ref 25–47)
POTASSIUM SERPL-SCNC: 3.8 MMOL/L (ref 3.4–5.3)
PR INTERVAL - MUSE: 182 MS
QRS DURATION - MUSE: 114 MS
QT - MUSE: 376 MS
QTC - MUSE: 444 MS
R AXIS - MUSE: -36 DEGREES
RBC # BLD AUTO: 4.57 10E6/UL (ref 4.4–5.9)
SAO2 % BLDV: 33 % (ref 94–100)
SODIUM SERPL-SCNC: 138 MMOL/L (ref 136–145)
SYSTOLIC BLOOD PRESSURE - MUSE: NORMAL MMHG
T AXIS - MUSE: 38 DEGREES
TROPONIN T SERPL HS-MCNC: <6 NG/L
VENTRICULAR RATE- MUSE: 84 BPM
WBC # BLD AUTO: 11 10E3/UL (ref 4–11)

## 2023-02-09 PROCEDURE — 96374 THER/PROPH/DIAG INJ IV PUSH: CPT

## 2023-02-09 PROCEDURE — 82803 BLOOD GASES ANY COMBINATION: CPT

## 2023-02-09 PROCEDURE — 36415 COLL VENOUS BLD VENIPUNCTURE: CPT | Performed by: EMERGENCY MEDICINE

## 2023-02-09 PROCEDURE — 258N000003 HC RX IP 258 OP 636: Performed by: PHYSICIAN ASSISTANT

## 2023-02-09 PROCEDURE — 93005 ELECTROCARDIOGRAM TRACING: CPT

## 2023-02-09 PROCEDURE — 99291 CRITICAL CARE FIRST HOUR: CPT | Performed by: PHYSICIAN ASSISTANT

## 2023-02-09 PROCEDURE — 83605 ASSAY OF LACTIC ACID: CPT

## 2023-02-09 PROCEDURE — 99223 1ST HOSP IP/OBS HIGH 75: CPT | Mod: AI | Performed by: STUDENT IN AN ORGANIZED HEALTH CARE EDUCATION/TRAINING PROGRAM

## 2023-02-09 PROCEDURE — 120N000001 HC R&B MED SURG/OB

## 2023-02-09 PROCEDURE — 80048 BASIC METABOLIC PNL TOTAL CA: CPT | Performed by: EMERGENCY MEDICINE

## 2023-02-09 PROCEDURE — 95816 EEG AWAKE AND DROWSY: CPT

## 2023-02-09 PROCEDURE — 250N000011 HC RX IP 250 OP 636: Performed by: EMERGENCY MEDICINE

## 2023-02-09 PROCEDURE — 85730 THROMBOPLASTIN TIME PARTIAL: CPT | Performed by: EMERGENCY MEDICINE

## 2023-02-09 PROCEDURE — 84484 ASSAY OF TROPONIN QUANT: CPT | Performed by: EMERGENCY MEDICINE

## 2023-02-09 PROCEDURE — 36415 COLL VENOUS BLD VENIPUNCTURE: CPT

## 2023-02-09 PROCEDURE — 85025 COMPLETE CBC W/AUTO DIFF WBC: CPT | Performed by: EMERGENCY MEDICINE

## 2023-02-09 PROCEDURE — 99285 EMERGENCY DEPT VISIT HI MDM: CPT | Mod: 25

## 2023-02-09 PROCEDURE — 70551 MRI BRAIN STEM W/O DYE: CPT

## 2023-02-09 PROCEDURE — 250N000009 HC RX 250: Performed by: EMERGENCY MEDICINE

## 2023-02-09 PROCEDURE — 85610 PROTHROMBIN TIME: CPT | Performed by: EMERGENCY MEDICINE

## 2023-02-09 PROCEDURE — 70450 CT HEAD/BRAIN W/O DYE: CPT

## 2023-02-09 PROCEDURE — 250N000011 HC RX IP 250 OP 636

## 2023-02-09 PROCEDURE — 70498 CT ANGIOGRAPHY NECK: CPT

## 2023-02-09 PROCEDURE — 250N000011 HC RX IP 250 OP 636: Performed by: PHYSICIAN ASSISTANT

## 2023-02-09 RX ORDER — HYDRALAZINE HYDROCHLORIDE 20 MG/ML
10 INJECTION INTRAMUSCULAR; INTRAVENOUS EVERY 4 HOURS PRN
Status: DISCONTINUED | OUTPATIENT
Start: 2023-02-09 | End: 2023-02-20 | Stop reason: HOSPADM

## 2023-02-09 RX ORDER — OXYCODONE HYDROCHLORIDE 5 MG/1
5 TABLET ORAL EVERY 4 HOURS PRN
Status: DISCONTINUED | OUTPATIENT
Start: 2023-02-09 | End: 2023-02-20 | Stop reason: HOSPADM

## 2023-02-09 RX ORDER — ONDANSETRON 2 MG/ML
4 INJECTION INTRAMUSCULAR; INTRAVENOUS ONCE
Status: COMPLETED | OUTPATIENT
Start: 2023-02-09 | End: 2023-02-09

## 2023-02-09 RX ORDER — NALOXONE HYDROCHLORIDE 0.4 MG/ML
0.4 INJECTION, SOLUTION INTRAMUSCULAR; INTRAVENOUS; SUBCUTANEOUS
Status: DISCONTINUED | OUTPATIENT
Start: 2023-02-09 | End: 2023-02-20 | Stop reason: HOSPADM

## 2023-02-09 RX ORDER — ONDANSETRON 2 MG/ML
4 INJECTION INTRAMUSCULAR; INTRAVENOUS EVERY 6 HOURS PRN
Status: DISCONTINUED | OUTPATIENT
Start: 2023-02-09 | End: 2023-02-20 | Stop reason: HOSPADM

## 2023-02-09 RX ORDER — PROCHLORPERAZINE MALEATE 5 MG
5 TABLET ORAL EVERY 6 HOURS PRN
Status: DISCONTINUED | OUTPATIENT
Start: 2023-02-09 | End: 2023-02-20 | Stop reason: HOSPADM

## 2023-02-09 RX ORDER — LEVETIRACETAM 500 MG/1
1000 TABLET ORAL 2 TIMES DAILY
Status: DISCONTINUED | OUTPATIENT
Start: 2023-02-10 | End: 2023-02-11

## 2023-02-09 RX ORDER — NALOXONE HYDROCHLORIDE 0.4 MG/ML
0.2 INJECTION, SOLUTION INTRAMUSCULAR; INTRAVENOUS; SUBCUTANEOUS
Status: DISCONTINUED | OUTPATIENT
Start: 2023-02-09 | End: 2023-02-20 | Stop reason: HOSPADM

## 2023-02-09 RX ORDER — ONDANSETRON 4 MG/1
4 TABLET, ORALLY DISINTEGRATING ORAL EVERY 6 HOURS PRN
Status: DISCONTINUED | OUTPATIENT
Start: 2023-02-09 | End: 2023-02-20 | Stop reason: HOSPADM

## 2023-02-09 RX ORDER — LORAZEPAM 2 MG/ML
INJECTION INTRAMUSCULAR
Status: COMPLETED
Start: 2023-02-09 | End: 2023-02-09

## 2023-02-09 RX ORDER — ALENDRONATE SODIUM 70 MG/1
70 TABLET ORAL
COMMUNITY

## 2023-02-09 RX ORDER — LISINOPRIL 20 MG/1
20 TABLET ORAL DAILY
COMMUNITY

## 2023-02-09 RX ORDER — PROCHLORPERAZINE 25 MG
12.5 SUPPOSITORY, RECTAL RECTAL EVERY 12 HOURS PRN
Status: DISCONTINUED | OUTPATIENT
Start: 2023-02-09 | End: 2023-02-20 | Stop reason: HOSPADM

## 2023-02-09 RX ORDER — IOPAMIDOL 755 MG/ML
75 INJECTION, SOLUTION INTRAVASCULAR ONCE
Status: COMPLETED | OUTPATIENT
Start: 2023-02-09 | End: 2023-02-09

## 2023-02-09 RX ORDER — LORAZEPAM 2 MG/ML
1 INJECTION INTRAMUSCULAR ONCE
Status: COMPLETED | OUTPATIENT
Start: 2023-02-09 | End: 2023-02-09

## 2023-02-09 RX ORDER — AMOXICILLIN 250 MG
2 CAPSULE ORAL 2 TIMES DAILY PRN
Status: DISCONTINUED | OUTPATIENT
Start: 2023-02-09 | End: 2023-02-20 | Stop reason: HOSPADM

## 2023-02-09 RX ORDER — ACETAMINOPHEN 650 MG/1
650 SUPPOSITORY RECTAL EVERY 6 HOURS PRN
Status: DISCONTINUED | OUTPATIENT
Start: 2023-02-09 | End: 2023-02-20 | Stop reason: HOSPADM

## 2023-02-09 RX ORDER — LABETALOL HYDROCHLORIDE 5 MG/ML
10 INJECTION, SOLUTION INTRAVENOUS
Status: DISCONTINUED | OUTPATIENT
Start: 2023-02-09 | End: 2023-02-20 | Stop reason: HOSPADM

## 2023-02-09 RX ORDER — ACETAMINOPHEN 325 MG/1
650 TABLET ORAL EVERY 6 HOURS PRN
Status: DISCONTINUED | OUTPATIENT
Start: 2023-02-09 | End: 2023-02-20 | Stop reason: HOSPADM

## 2023-02-09 RX ORDER — AMOXICILLIN 250 MG
1 CAPSULE ORAL 2 TIMES DAILY PRN
Status: DISCONTINUED | OUTPATIENT
Start: 2023-02-09 | End: 2023-02-20 | Stop reason: HOSPADM

## 2023-02-09 RX ORDER — LIDOCAINE 40 MG/G
CREAM TOPICAL
Status: DISCONTINUED | OUTPATIENT
Start: 2023-02-09 | End: 2023-02-20 | Stop reason: HOSPADM

## 2023-02-09 RX ORDER — POLYETHYLENE GLYCOL 3350 17 G/17G
17 POWDER, FOR SOLUTION ORAL DAILY PRN
Status: DISCONTINUED | OUTPATIENT
Start: 2023-02-09 | End: 2023-02-20 | Stop reason: HOSPADM

## 2023-02-09 RX ADMIN — IOPAMIDOL 75 ML: 755 INJECTION, SOLUTION INTRAVENOUS at 14:32

## 2023-02-09 RX ADMIN — ONDANSETRON 4 MG: 2 INJECTION INTRAMUSCULAR; INTRAVENOUS at 14:28

## 2023-02-09 RX ADMIN — SODIUM CHLORIDE 90 ML: 900 INJECTION INTRAVENOUS at 14:33

## 2023-02-09 RX ADMIN — LEVETIRACETAM 2000 MG: 100 INJECTION, SOLUTION INTRAVENOUS at 17:27

## 2023-02-09 RX ADMIN — LORAZEPAM 1 MG: 2 INJECTION INTRAMUSCULAR; INTRAVENOUS at 14:29

## 2023-02-09 ASSESSMENT — ACTIVITIES OF DAILY LIVING (ADL)
ADLS_ACUITY_SCORE: 35
ADLS_ACUITY_SCORE: 42

## 2023-02-09 NOTE — CONSULTS
"      [facility]    Stroke Consult Note    Reason for Consult: Stroke Code     Chief Complaint: Altered Mental Status      HPI  Sam Barry is a 77 year old male with pertinent past medical history of arthritis, BPH, HLD, prediabetes, mild cognitive impairment, wife reports progressive worsening gait x 3 months to the point where he can now hardly walk even with a walker (shuffling)-wife was concerned of Parkinson's as she is a retired RN, not on AC and no history of stroke/MI/or surgery in the past 3 months, no history of ICH or brain mets, or current bleeding or black/tarry stools. No alcohol use. No prior history of seizures.    He presented to [facility] ED today via EMS after wife was about to take him to a restaurant to eat around 1340 this afternoon and he had acute onset of altered mental status, dysarthric speech that she couldn't understand \"talking gibberish\" he then was weak and unsteady so she attempted to help him sit down. He then lost consciousness and had urinary incontinence, no tremors or shaking. She did call EMS and he was asleep and not able to wake up. He was hypertensive SBP 190s. .    When he woke up he was agitated and combative but still nonverbal not answering any questions.  He vomited. No evidence of tongue biting. Given ativan IV 1 mg before CT. He was CTH negative for bleed. CTA without severe stenosis or LVO. Symptoms were concerning for seizure with postictal confusion/somnolence but given global aphasia decided to pursue hyperacute MRI. Prior to MRI I did review any potential contraindications to TNK with his wife and she helped fill out the checklist. MRI did not shows any evidence of stroke. Lactate 3.8. Following MRI he was awake and answered clearly his full name and said he was feeling good.      Imaging Findings  CTH:  1.  No acute intracranial abnormality.     HEAD CTA:  1.  No large artery stenosis or occlusion. No aneurysm.     NECK " "CTA:  1.  No measurable carotid or vertebral artery stenosis.  2.  Mildly ectatic and tortuous distal cervical internal carotid  Arteries.    Hyperacute MRI:  No evidence of acute stroke on DWI, pending formal read    Intravenous Thrombolysis  Not given due to:   - stroke mimic: MRI negative for stroke    Endovascular Treatment  Not initiated due to absence of proximal vessel occlusion    Impression   Global aphasia and somnolence following loss of consciousness and urinary incontinence, suspect seizure in setting of lactic acidosis versus rule out metabolic/infectious/toxic etiology (lower suspicion given significant improvement following ativan and MRI)    Recommendations  -Keppra 2 g x1 then maintenance 1 g BID  -consult general neurology    Thank you for this consult. No further stroke evaluation is recommended, so we will sign off. Please contact us with any additional questions.     Tiffanie Campos PA-C  Vascular Neurology    To page me or covering stroke neurology team member, click here: AMCOM  Choose \"On Call\" tab at top, then select \"NEUROLOGY/ALL SITES\" from middle drop-down box, press Enter, then look for \"stroke\" or \"telestroke\" for your site.    ______________________________________________________    Clinically Significant Risk Factors Present on Admission               # Coagulation Defect: INR = 1.17 (Ref range: 0.85 - 1.15) and/or PTT = 33 Seconds (Ref range: 22 - 38 Seconds), will monitor for bleeding                 Past Medical History   Past Medical History:   Diagnosis Date     Arthritis     lower leg     BPH (benign prostatic hyperplasia)      Hyperlipidemia      IFG (impaired fasting glucose)      MCI (mild cognitive impairment)     with memory loss     Osteopenia      Tachycardia      Vitamin D deficiency      Past Surgical History   Past Surgical History:   Procedure Laterality Date     APPENDECTOMY       CYSTOSCOPY, TRANSURETHRAL RESECTION (TUR) PROSTATE, COMBINED N/A 9/18/2018    " Procedure: COMBINED CYSTOSCOPY, TRANSURETHRAL RESECTION (TUR) PROSTATE;  CYSTOSCOPY, TRANSURETHRAL RESECTION OF THE PROSTATE.    EBL: 30mL;  Surgeon: Fredy Feliz MD;  Location:  OR     Medications   Home Meds  Prior to Admission medications    Medication Sig Start Date End Date Taking? Authorizing Provider   acetaminophen (TYLENOL) 325 MG tablet Take 2 tablets (650 mg) by mouth every 4 hours as needed for other (mild pain)  Patient not taking: Reported on 10/15/2018 9/18/18   Fredy Feliz MD   Multiple Vitamin (MULTI-VITAMINS) TABS Take 1 tablet by mouth daily  12/21/17   Reported, Patient   sennosides (SENOKOT) 8.6 MG tablet Take 1-3 tablets by mouth daily as needed for constipation  Patient not taking: Reported on 10/15/2018 3/19/18   Barbara Varghese PA-C   VITAMIN D, CHOLECALCIFEROL, PO Take 1 tablet by mouth daily    Reported, Patient       Scheduled Meds      Infusion Meds      PRN Meds      Allergies   No Known Allergies  Family History   No family history on file.  Social History   Social History     Tobacco Use     Smoking status: Never     Smokeless tobacco: Never   Substance Use Topics     Alcohol use: No     Drug use: No       Review of Systems   Not able to obtain due to AMS       PHYSICAL EXAMINATION  Pulse:  [70-85] 80  Resp:  [11-23] 16  BP: (150-159)/(87-94) 150/87  SpO2:  [90 %-97 %] 92 %     General Exam  General:  patient lying in bed somnolent  HEENT:  normocephalic/atraumatic  Pulmonary:  no respiratory distress on oxymask    Neuro Exam  Mental Status: opens eyes and follows some limited commands to noxious stimuli, doesn't answer any questions appropriately and a few words are all severely dysarthric   Cranial Nerves:  Blinks to threat b/l, PERRL, EOMI with normal smooth pursuit, facial movements grossly symmetric, hearing not formally tested but intact to conversation, does not protrude tongue, no tongue biting  Motor: able to follow commands to keep arms up for 10  seconds without drift, does not follow commands to lift legs but there is movement spontaneously  Reflexes:  toes down-going  Sensory: withdrawals and grimaces to noxious all extremities, doesn't follow commands to test for neglect   Coordination: not obtained due to AMS, not following commands    Station/Gait:  deferred    Dysphagia Screen  Should be cleared by SPT    Stroke Scales    NIHSS  1a. Level of Consciousness 2-->Not alert, requires repeated stimulation to attend, or is obtunded and requires strong or painful stimulation to make movements (not stereotyped)   1b. LOC Questions 2-->Answers neither question correctly   1c. LOC Commands 0-->Performs both tasks correctly   2.   Best Gaze 0-->Normal   3.   Visual 0-->No visual loss   4.   Facial Palsy 0-->Normal symmetrical movements   5a. Motor Arm, Left 0-->No drift, limb holds 90 (or 45) degrees for full 10 secs   5b. Motor Arm, Right 0-->No drift, limb holds 90 (or 45) degrees for full 10 secs   6a. Motor Leg, Left 3-->No effort against gravity, leg falls to bed immediately   6b. Motor Leg, right 3-->No effort against gravity, leg falls to bed immediately   7.   Limb Ataxia 0-->Absent   8.   Sensory 0-->Normal, no sensory loss   9.   Best Language 3-->Mute, global aphasia, no usable speech or auditory comprehension   10. Dysarthria 2-->Severe dysarthria, patients speech is so slurred as to be unintelligible in the absence of or out of proportion to any dysphasia, or is mute/anarthric   11. Extinction and Inattention  0-->No abnormality   Total 15 (02/09/23 1459)       Modified Elliott Score (Pre-morbid)  4 - Moderately severe disability.  Unable to attend to own bodily needs without assistance or unable to walk unassisted.    Imaging  I personally reviewed all imaging; relevant findings per HPI.     Lab Results Data   CBC  Recent Labs   Lab 02/09/23  1428   WBC 11.0   RBC 4.57   HGB 13.8   HCT 41.3        Basic Metabolic Panel    No results for  input(s): NA, POTASSIUM, CHLORIDE, CO2, BUN, CR, GLC, KATIE in the last 168 hours.  Liver Panel  No results for input(s): PROTTOTAL, ALBUMIN, BILITOTAL, ALKPHOS, AST, ALT, BILIDIRECT in the last 168 hours.  INR    Recent Labs   Lab Test 02/09/23  1428   INR 1.17*      Lipid Profile  No lab results found.  A1C  No lab results found.  Troponin  No results for input(s): CTROPT, TROPONINIS, TROPONINI, GHTROP in the last 168 hours.       Stroke Code Data Data   Stroke Code Data  (for stroke code without tele)  Stroke code activated 02/09/23   1426   First stroke provider response 02/09/23   1430   Last known normal 02/09/23   1339   Time of discovery   (or onset of symptoms) 02/09/23   1340   Head CT read by Stroke Neuro Dr/Provider 02/09/23   1441   Was stroke code de-escalated? Yes 02/09/23 1606            I personally examined and evaluated the patient today. At the time of my evaluation and management the patient was in critical condition today due to stroke code. I personally managed review of chart, medical record, meds, imaging, history, exam, and discussion with attending regarding plan and documentation.  I spent a total of 120 minutes providing critical care services, evaluating the patient, directing care and reviewing laboratory values and radiologic reports.

## 2023-02-09 NOTE — ED NOTES
Bed: ST02  Expected date:   Expected time:   Means of arrival:   Comments:  429  78M Stroke alert  1424

## 2023-02-09 NOTE — PROGRESS NOTES
RECEIVING UNIT ED HANDOFF REVIEW    ED Nurse Handoff Report was reviewed by: Katherine Leung RN on February 9, 2023 at 5:30 PM

## 2023-02-09 NOTE — ED TRIAGE NOTES
Pt presents to the ED via EMS for evaluation of AMS.     Per EMS: wife and pt were supposed to go out to lunch, pt sat in chair, episode of unresponsiveness, unable to answer questions en route to ED, inconsistent in following commands. EMS placed 18g left AC, blood sugar WNL. Pt agitated upon arrival to the ED. Pt vomiting on arrival to ED, suction at the ready.      Triage Assessment       Row Name 02/09/23 1448       [place] Coma Scale    Best Eye Response 4-->(E4) spontaneous    Best Verbal Response 4-->(V4) confused

## 2023-02-09 NOTE — ED PROVIDER NOTES
History     Chief Complaint:  Altered Mental Status       HPI   Sam Barry is a 77 year old male who presents with altered mental status. Wife states they were getting ready to go out when the patient had onset of weakness in his legs. She reports that he started looking at her confused and he could not tell her his name. She states she sat him down and he started having labored breathing and full body tremors so she called EMS.  She notes that this is new for him but that he did have an episode where he could not get out of bed a few weeks ago and was having difficulty ambulating. Since he has been intermittently walking with a walker and shuffling. Prior to today though she reports he has had no issues with eating or talking. His wife denies any trauma or falls.  The patient endorses headache.    EMS reports that en arrival the patient was nonverbal and not moving. They state that en route he then became progressively more alert and agitated, but he still was mumbling incoherently. He currently on arrival is confused and not answering appropriately. The patient had an episode of emesis on arrival. They also state the patient was hypertensive en route.     Independent Historian: EMS, patient's wife    ROS:  Review of Systems   Unable to perform ROS: Mental status change       Allergies:  No Known Allergies     Medications:    Lisinopril     Past Medical History:    Arthritis   BPD   Hyperlipidemia   Mild cognitive impairment   Osteopenia     Past Surgical History:    Appendectomy   TURP     Social History:  The patient presents via EMS.   Wife presents at bedside.    reports that he has never smoked. He has never used smokeless tobacco. He reports that he does not drink alcohol and does not use drugs.  PCP: Jovan Montenegro     Physical Exam     Patient Vitals for the past 24 hrs:   BP Pulse Resp SpO2 Weight   02/09/23 1632 (!) 149/123 -- -- 96 % --   02/09/23 1621 -- -- -- 92 % --   02/09/23 1620  -- -- -- 93 % --   02/09/23 1619 -- -- -- 93 % --   02/09/23 1618 -- -- -- 94 % --   02/09/23 1617 (!) 164/113 91 -- 97 % --   02/09/23 1616 (!) 167/109 92 -- -- --   02/09/23 1522 -- 89 18 98 % --   02/09/23 1520 -- 84 14 98 % --   02/09/23 1512 (!) 150/94 83 13 97 % --   02/09/23 1507 -- 80 16 92 % --   02/09/23 1506 -- 80 11 92 % --   02/09/23 1505 -- 79 15 91 % --   02/09/23 1504 -- 85 18 90 % --   02/09/23 1502 -- 84 16 -- --   02/09/23 1501 -- 84 16 92 % --   02/09/23 1500 (!) 150/87 83 16 91 % --   02/09/23 1452 (!) 151/94 84 15 93 % --   02/09/23 1437 (!) 151/92 -- -- -- --   02/09/23 1429 (!) 159/87 70 23 97 % 82.4 kg (181 lb 10.5 oz)        Physical Exam    GENERAL: Confused agitated  HEAD:  no signs of head trauma  EYES: pupils reactive, extraocular muscles intact, conjunctivae normal  ENT:  mucus membranes moist  NECK:  trachea midline, normal range of motion  RESPIRATORY: no tachypnea, breath sounds clear to auscultation   CVS: normal S1/S2, no murmurs, intact distal pulses  ABDOMEN: soft, nontender, nondistention  MUSCULOSKELETAL: no deformities  SKIN: warm and dry, no acute rashes or ulceration  NEURO: Broken up speech moving all extremities being held down as he is agitated  PSYCH: Generally agitated      Emergency Department Course   ECG  ECG results from 02/09/23   EKG 12-lead, tracing only     Value    Systolic Blood Pressure     Diastolic Blood Pressure     Ventricular Rate 84    Atrial Rate 84    TX Interval 182    QRS Duration 114        QTc 444    P Axis 49    R AXIS -36    T Axis 38    Interpretation ECG      Sinus rhythm with Premature atrial complexes  Left axis deviation  Abnormal ECG  When compared with ECG of 21-SEP-2020 21:55,  Premature atrial complexes are now Present         Imaging:  MR Brain w/o Contrast   Final Result   IMPRESSION:    Motion limited exam. No evidence of acute ischemia or hemorrhage.   Volume loss and nonspecific confluent white matter hypoattenuation   which  presumably represents advanced chronic small vessel ischemic   change. 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      AISSATOU GALLOWAY MD            SYSTEM ID:  [id number]      CTA Head Neck w Contrast   Final Result   IMPRESSION:      HEAD CTA:   1.  No large artery stenosis or occlusion. No aneurysm.      NECK CTA:   1.  No measurable carotid or vertebral artery stenosis.   2.  Mildly ectatic and tortuous distal cervical internal carotid   arteries.      The results were communicated to Dr. Allen at 3:02 PM on 2/9/2023.      PENNIE AGUSTIN MD            SYSTEM ID:  [id number]      CT Head w/o Contrast   Final Result   IMPRESSION:   1.  No acute intracranial abnormality.      PENNIE AGUSTIN MD            SYSTEM ID:  [id number]         Report per radiology    Laboratory:  Labs Ordered and Resulted from Time of ED Arrival to Time of ED Departure   BASIC METABOLIC PANEL - Abnormal       Result Value    Sodium 138      Potassium 3.8      Chloride 98      Carbon Dioxide (CO2) 20 (*)     Anion Gap 20 (*)     Urea Nitrogen 10.1      Creatinine 1.09      Calcium 9.9      Glucose 159 (*)     GFR Estimate 70     INR - Abnormal    INR 1.17 (*)    ISTAT GASES LACTATE VENOUS POCT - Abnormal    Lactic Acid POCT 3.8 (*)     Bicarbonate Venous POCT 28      O2 Sat, Venous POCT 33 (*)     pCO2V Venous POCT 54 (*)     pH Venous POCT 7.33      pO2 Venous POCT 22 (*)    PARTIAL THROMBOPLASTIN TIME - Normal    aPTT 33     TROPONIN T, HIGH SENSITIVITY - Normal    Troponin T, High Sensitivity <6     CBC WITH PLATELETS AND DIFFERENTIAL    WBC Count 11.0      RBC Count 4.57      Hemoglobin 13.8      Hematocrit 41.3      MCV 90      MCH 30.2      MCHC 33.4      RDW 11.3      Platelet Count 277      % Neutrophils 65      % Lymphocytes 27      % Monocytes 4      % Eosinophils 1      % Basophils 1      % Immature Granulocytes 2      NRBCs per 100 WBC 0      Absolute Neutrophils 7.3      Absolute Lymphocytes 3.0      Absolute Monocytes 0.5       Absolute Eosinophils 0.1      Absolute Basophils 0.1      Absolute Immature Granulocytes 0.2      Absolute NRBCs 0.0     GLUCOSE MONITOR NURSING POCT        Procedures     Emergency Department Course & Assessments:    Interventions:  Medications   levETIRAcetam (KEPPRA) 2,000 mg in sodium chloride 0.9 % 250 mL intermittent infusion (has no administration in time range)   levETIRAcetam (KEPPRA) tablet 1,000 mg (has no administration in time range)   Saline flush (90 mLs Intravenous Given 2/9/23 1433)   iopamidol (ISOVUE-370) solution 75 mL (75 mLs Intravenous Given 2/9/23 1432)   LORazepam (ATIVAN) 2 MG/ML injection (1 mg  Given 2/9/23 1429)   ondansetron (ZOFRAN) injection 4 mg (4 mg Intravenous Given 2/9/23 1428)   LORazepam (ATIVAN) injection 1 mg (1 mg Intravenous Not Given 2/9/23 1457)        Consultations/Discussion of Management or Tests:  1429 I spoke with Dr. Moreno of stroke neurology regarding patient's presentation, findings, and plan of care.      1601 Dr. Moreno spoke with me and told me they are deescalating code stroke     1640 I spoke with Dr. Lockhart of the Hospitalist service from [facility] regarding patient's presentation, findings, and plan of care.       Assessments:  ED Course as of 02/09/23 1649   Thu Feb 09, 2023   1423 I obtained history and examined the patient as noted above.   1424 Tier 1 stroke code called       Disposition:  The patient was admitted to the hospital under the care of Dr. Lockhart.     Impression & Plan      Medical Decision Making:  Patient presents with episode of altered mental status and unresponsiveness and now agitation.  Wife notes that he has had what she is been concerned about his Parkinson's type symptoms with shaking of his hands and his shuffling gait but today had an episode where he became unresponsive and she had to lay him back down.  She noted some shaking-like activity as well as moaning and breathing heavily.  EMS notes that he was  unresponsive for about 15 to 20 minutes and then gradually became responsive and then became agitated.  Code stroke was called but certainly seizure sounds more consistent after more history is gathered.  He was given Ativan.  Code stroke was de-escalated and stat MRI is negative for stroke.  He was given Keppra.  Still has some mild confusion but is not agitated.  Spoke with the hospitalist regarding admission.    Critical Care time:  was 45 minutes for this patient excluding procedures.    Diagnosis:    ICD-10-CM    1. Seizure disorder (H)  G40.909            Scribe Disclosure:  I, Maira Vazquez, am serving as a scribe at 4:31 PM on 2/9/2023 to document services personally performed by Carlos Allen MD based on my observations and the provider's statements to me.     2/9/2023   Carlos Allen MD Adams, Shaun L, MD  02/09/23 0467

## 2023-02-09 NOTE — ED NOTES
[facility]  ED Nurse Handoff Report    ED Chief complaint: Altered Mental Status      ED Diagnosis:   Final diagnoses:   None       Code Status: To be addressed by admitting MD.     Allergies: No Known Allergies    Patient Story:   Pt presents to the ED via EMS for evaluation of AMS.      Per EMS: wife and pt were supposed to go out to lunch, pt sat in chair, episode of unresponsiveness, unable to answer questions en route to ED, inconsistent in following commands. EMS placed 18g left AC, blood sugar WNL. Pt agitated upon arrival to the ED. Pt vomiting on arrival to ED, suction at the ready.      Focused Assessment:    No neuro deficits at this time in ED, alert/oriented x4, speaks in full sentences, strong in all four extremities, Neuro work up normal in ED (CT scan and MRI).   Radial pulse regular  Respirations even and unlabored     Treatments and/or interventions provided:   MR Brain w/o Contrast   Final Result   IMPRESSION:    Motion limited exam. No evidence of acute ischemia or hemorrhage.   Volume loss and nonspecific confluent white matter hypoattenuation   which presumably represents advanced chronic small vessel ischemic   change. 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      AISSATOU GALLOWAY MD            SYSTEM ID:  [id number]      CTA Head Neck w Contrast   Final Result   IMPRESSION:      HEAD CTA:   1.  No large artery stenosis or occlusion. No aneurysm.      NECK CTA:   1.  No measurable carotid or vertebral artery stenosis.   2.  Mildly ectatic and tortuous distal cervical internal carotid   arteries.      The results were communicated to Dr. Allen at 3:02 PM on 2/9/2023.      PENNIE AGUSTIN MD            SYSTEM ID:  [id number]      CT Head w/o Contrast   Final Result   IMPRESSION:   1.  No acute intracranial abnormality.      PENNIE AGUSTIN MD            SYSTEM ID:  [id number]          Patient's response to treatments and/or interventions: tolerated appropriately     To be  done/followed up on inpatient unit:  Further evaluation.     Does this patient have any cognitive concerns?: Mild memory problems , wife assits with cares    Activity level - Baseline/Home:  Stand with Assist  Activity Level - Current:   Total Care    Patient's Preferred language: English   Needed?: No    Isolation: None  Infection: Not Applicable  Patient tested for COVID 19 prior to admission:  Bariatric?:     Vital Signs:   Vitals:    02/09/23 1618 02/09/23 1619 02/09/23 1620 02/09/23 1621   BP:       Pulse:       Resp:       SpO2: 94% 93% 93% 92%   Weight:           Cardiac Rhythm:     Was the PSS-3 completed:   Yes  What interventions are required if any?               Family Comments: Wife at bedside in ED.   OBS brochure/video discussed/provided to patient/family: N/A              Name of person given brochure if not patient: NA              Relationship to patient: NA     For the majority of the shift this patient's behavior was Green.   Behavioral interventions performed were frequent rounding in ED.    ED NURSE PHONE NUMBER: [phone]

## 2023-02-09 NOTE — H&P
"[facility]    History and Physical - Hospitalist Service       Date of Admission:  2/9/2023    Assessment & Plan      Sam Barry is a 77 year old male admitted on 2/9/2023.     Suspected new onset seizure   * wife witnessed shaking followed by prolonged period of lethargy, confusion that slowly resolved  * lactic acidosis noted post episode  * CT/CTA head negative for acute findings   * hyper acute MRI negative for acute findings   - neuro consult  - continue keppra 1000mg BID ordered by stroke neurology  - EEG ordered  - infectious w/u with UA and CXR (no other source identified)    HTN  - continue lisinopril    Osteopenia  - hold alendronate    Mild cognitive impairment  Reported parkinsonian features  * wife reports shuffling gait, tremor, confusion  - consult neuro as above       Diet: Regular Diet Adult    DVT Prophylaxis: Pneumatic Compression Devices  Cardenas Catheter: Not present  Lines: None     Cardiac Monitoring: ACTIVE order. Indication: Tachyarrhythmias, acute (48 hours)  Code Status: Prior    Clinically Significant Risk Factors Present on Admission             # Anion Gap Metabolic Acidosis: Highest Anion Gap = 20 mmol/L in last 2 days, will monitor and treat as appropriate   # Coagulation Defect: INR = 1.17 (Ref range: 0.85 - 1.15) and/or PTT = 33 Seconds (Ref range: 22 - 38 Seconds), will monitor for bleeding    # Hypertension: home medication list includes antihypertensive(s)   # Acute Respiratory Failure: Documented O2 saturation < 91%.  Continue supplemental oxygen as needed     # Overweight: Estimated body mass index is 26.81 kg/m  as calculated from the following:    Height as of this encounter: 1.753 m (5' 9.02\").    Weight as of this encounter: 82.4 kg (181 lb 10.5 oz).           Disposition Plan      Expected Discharge Date: 02/11/2023                  Eduardo Lockhart MD  Hospitalist Service  [facility]  Securely message with [employer] " (more info)  Text page via [facility] Paging/Directory     ______________________________________________________________________    Chief Complaint   AMS, shaking     History is obtained from the patient, electronic health record and emergency department physician    History of Present Illness   Sam Barry is a 77 year old male who has a history of mild cognitive impairment, hypertension.  He presents via EMS.  His wife reports a progressive worsening gait for the last 3 months to the point where he only has a shuffling gait at this point.  She notes that today they are planning to go out to eat but that he suddenly had slurred speech, confusion.  He became weak so she helped him sit to the ground.  He did have loss of consciousness at that point and urinary incontinence.  She reported to ED and EMS providers that he had a period of shaking, but to neurology she denied this.  Upon waking he was agitated and combative.  On EMS arrival he was quite lethargic.  While in the emergency department he remained lethargic with slowly improving alertness.      Past Medical History    Past Medical History:   Diagnosis Date     Arthritis     lower leg     BPH (benign prostatic hyperplasia)      Hyperlipidemia      IFG (impaired fasting glucose)      MCI (mild cognitive impairment)     with memory loss     Osteopenia      Tachycardia      Vitamin D deficiency        Past Surgical History   Past Surgical History:   Procedure Laterality Date     APPENDECTOMY       CYSTOSCOPY, TRANSURETHRAL RESECTION (TUR) PROSTATE, COMBINED N/A 9/18/2018    Procedure: COMBINED CYSTOSCOPY, TRANSURETHRAL RESECTION (TUR) PROSTATE;  CYSTOSCOPY, TRANSURETHRAL RESECTION OF THE PROSTATE.    EBL: 30mL;  Surgeon: Fredy Feliz MD;  Location:  OR       Prior to Admission Medications   Prior to Admission Medications   Prescriptions Last Dose Informant Patient Reported? Taking?   Multiple Vitamin (MULTI-VITAMINS) TABS  Self Yes No   Sig: Take 1  tablet by mouth daily    VITAMIN D, CHOLECALCIFEROL, PO  Self Yes No   Sig: Take 1 tablet by mouth daily   alendronate (FOSAMAX) 70 MG tablet   Yes Yes   Sig: Take 70 mg by mouth every 7 days   lisinopril (ZESTRIL) 20 MG tablet   Yes Yes   Sig: Take 20 mg by mouth daily      Facility-Administered Medications: None           Physical Exam   Vital Signs: Temp: 97.8  F (36.6  C) Temp src: Oral BP: (!) 165/112 Pulse: 94   Resp: 16 SpO2: 98 % O2 Device: None (Room air)    Weight: 181 lbs 10.54 oz    Constitutional: Awake, alert, cooperative, no apparent distress  Respiratory: Clear to auscultation bilaterally, no crackles or wheezing  Cardiovascular: Regular rate and rhythm, normal S1 and S2, and no murmur noted  GI: Normal bowel sounds, soft, non-distended, non-tender  Skin/Integumen: No rashes, no cyanosis, no edema  Other: Alert to self, drifts off to sleep quite quickly on discussion. Not oriented to place, time, situation.       Medical Decision Making       75 MINUTES SPENT BY ME on the date of service doing chart review, history, exam, documentation & further activities per the note.      Data     I have personally reviewed the following data over the past 24 hrs:    11.0  \   13.8   / 277     138 98 10.1 /  159 (H)   3.8 20 (L) 1.09 \       Trop: <6 BNP: N/A       Procal: N/A CRP: N/A Lactic Acid: 2.7 (H)       INR:  1.17 (H) PTT:  33   D-dimer:  N/A Fibrinogen:  N/A       Imaging results reviewed over the past 24 hrs:   Recent Results (from the past 24 hour(s))   CT Head w/o Contrast    Narrative    CT OF THE HEAD WITHOUT CONTRAST   2/9/2023 2:37 PM     COMPARISON: 9/1/2020.    HISTORY: Code stroke. Altered mental status.    TECHNIQUE: Axial CT images of the head from the skull base to the  vertex were acquired without IV contrast. Dose reduction techniques  were used.     FINDINGS:   INTRACRANIAL CONTENTS: No intracranial hemorrhage, extraaxial  collection, or mass effect. No CT evidence of acute infarct.  Moderate  presumed chronic small vessel ischemic change with moderate  generalized volume loss..    VISUALIZED ORBITS/SINUSES/MASTOIDS: No significant orbital  abnormality. No significant paranasal sinus mucosal disease. No  significant middle ear or mastoid effusion.    OSSEOUS STRUCTURES/SOFT TISSUES: No significant abnormality.      Impression    IMPRESSION:  1.  No acute intracranial abnormality.    PENNIE AGUSTIN MD         SYSTEM ID:  [id number]   CTA Head Neck w Contrast    Narrative    CT ANGIOGRAM OF THE HEAD AND NECK WITH CONTRAST  2/9/2023 2:43 PM     COMPARISON: Head CT 9/21/2020.    HISTORY: Code stroke. Altered mental status.    TECHNIQUE:    Precontrast localizing scans were followed by CT angiography with an  injection of 75mL Isovue-370 nonionic intravenous contrast material  with scans through the head and neck.  Images were transferred to a  separate 3-D workstation where multiplanar reformations and 3-D images  were created. Estimates of carotid stenoses are made relative to the  distal internal carotid artery diameters except as noted. Dose  reduction techniques were used.    FINDINGS:   HEAD CTA:  ANTERIOR CIRCULATION: Minimal plaque within the carotid siphons. No  significant stenosis or occlusion. Standard [place] anatomy.    POSTERIOR CIRCULATION: No significant stenosis or occlusion. Balanced  vertebral arteries supply a normal basilar artery.    ANEURYSM/VASCULAR MALFORMATION: None.    NECK CTA:  RIGHT CAROTID: No measurable stenosis in the right ICA based on NASCET  criteria. Tortuous and mildly ectatic distal cervical ICA.    LEFT CAROTID: No measurable stenosis in the left ICA based on NASCET  criteria. Tortuous and mildly ectatic distal cervical ICA.     VERTEBRAL ARTERIES: Balanced vertebral arteries are patent in the neck  and into the head.     AORTIC ARCH: Classic aortic arch anatomy with no significant stenosis  at the origin of the great vessels.      Impression     IMPRESSION:    HEAD CTA:  1.  No large artery stenosis or occlusion. No aneurysm.    NECK CTA:  1.  No measurable carotid or vertebral artery stenosis.  2.  Mildly ectatic and tortuous distal cervical internal carotid  arteries.    The results were communicated to Dr. Allen at 3:02 PM on 2/9/2023.    PENNIE AGUSTIN MD         SYSTEM ID:  [id number]   MR Brain w/o Contrast    Narrative    MRI BRAIN WITHOUT CONTRAST  2/9/2023 4:01 PM    HISTORY: Hyperacute MRI - please start with DWI then FLAIR and push  images through as they come.    TECHNIQUE: Multiplanar, multisequence MRI of the brain without  gadolinium IV contrast material.      COMPARISON: Same day head CT      Impression    IMPRESSION:   Motion limited exam. No evidence of acute ischemia or hemorrhage.  Volume loss and nonspecific confluent white matter hypoattenuation  which presumably represents advanced chronic small vessel ischemic  change. 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    AISSATOU GALLOWAY MD         SYSTEM ID:  [id number]

## 2023-02-10 ENCOUNTER — APPOINTMENT (OUTPATIENT)
Dept: GENERAL RADIOLOGY | Facility: CLINIC | Age: 78
DRG: 689 | End: 2023-02-10
Attending: STUDENT IN AN ORGANIZED HEALTH CARE EDUCATION/TRAINING PROGRAM
Payer: COMMERCIAL

## 2023-02-10 LAB
ALBUMIN UR-MCNC: NEGATIVE MG/DL
ANION GAP SERPL CALCULATED.3IONS-SCNC: 9 MMOL/L (ref 7–15)
APPEARANCE UR: CLEAR
BACTERIA #/AREA URNS HPF: ABNORMAL /HPF
BILIRUB UR QL STRIP: NEGATIVE
BUN SERPL-MCNC: 8.6 MG/DL (ref 8–23)
CALCIUM SERPL-MCNC: 9.4 MG/DL (ref 8.8–10.2)
CHLORIDE SERPL-SCNC: 101 MMOL/L (ref 98–107)
COLOR UR AUTO: ABNORMAL
CREAT SERPL-MCNC: 0.99 MG/DL (ref 0.67–1.17)
DEPRECATED HCO3 PLAS-SCNC: 26 MMOL/L (ref 22–29)
ERYTHROCYTE [DISTWIDTH] IN BLOOD BY AUTOMATED COUNT: 11.2 % (ref 10–15)
GFR SERPL CREATININE-BSD FRML MDRD: 78 ML/MIN/1.73M2
GLUCOSE SERPL-MCNC: 105 MG/DL (ref 70–99)
GLUCOSE UR STRIP-MCNC: NEGATIVE MG/DL
HCT VFR BLD AUTO: 40 % (ref 40–53)
HGB BLD-MCNC: 13.4 G/DL (ref 13.3–17.7)
HGB UR QL STRIP: NEGATIVE
KETONES UR STRIP-MCNC: NEGATIVE MG/DL
LEUKOCYTE ESTERASE UR QL STRIP: ABNORMAL
MCH RBC QN AUTO: 30 PG (ref 26.5–33)
MCHC RBC AUTO-ENTMCNC: 33.5 G/DL (ref 31.5–36.5)
MCV RBC AUTO: 90 FL (ref 78–100)
MUCOUS THREADS #/AREA URNS LPF: PRESENT /LPF
NITRATE UR QL: NEGATIVE
PH UR STRIP: 6 [PH] (ref 5–7)
PLATELET # BLD AUTO: 243 10E3/UL (ref 150–450)
POTASSIUM SERPL-SCNC: 3.5 MMOL/L (ref 3.4–5.3)
RBC # BLD AUTO: 4.46 10E6/UL (ref 4.4–5.9)
RBC URINE: 1 /HPF
SODIUM SERPL-SCNC: 136 MMOL/L (ref 136–145)
SP GR UR STRIP: 1.02 (ref 1–1.03)
UROBILINOGEN UR STRIP-MCNC: NORMAL MG/DL
WBC # BLD AUTO: 9.8 10E3/UL (ref 4–11)
WBC URINE: 17 /HPF

## 2023-02-10 PROCEDURE — 71046 X-RAY EXAM CHEST 2 VIEWS: CPT

## 2023-02-10 PROCEDURE — 81001 URINALYSIS AUTO W/SCOPE: CPT | Performed by: STUDENT IN AN ORGANIZED HEALTH CARE EDUCATION/TRAINING PROGRAM

## 2023-02-10 PROCEDURE — 82310 ASSAY OF CALCIUM: CPT | Performed by: STUDENT IN AN ORGANIZED HEALTH CARE EDUCATION/TRAINING PROGRAM

## 2023-02-10 PROCEDURE — 36415 COLL VENOUS BLD VENIPUNCTURE: CPT | Performed by: STUDENT IN AN ORGANIZED HEALTH CARE EDUCATION/TRAINING PROGRAM

## 2023-02-10 PROCEDURE — 99233 SBSQ HOSP IP/OBS HIGH 50: CPT | Performed by: INTERNAL MEDICINE

## 2023-02-10 PROCEDURE — 250N000013 HC RX MED GY IP 250 OP 250 PS 637: Performed by: PHYSICIAN ASSISTANT

## 2023-02-10 PROCEDURE — 120N000001 HC R&B MED SURG/OB

## 2023-02-10 PROCEDURE — 85014 HEMATOCRIT: CPT | Performed by: STUDENT IN AN ORGANIZED HEALTH CARE EDUCATION/TRAINING PROGRAM

## 2023-02-10 PROCEDURE — 250N000013 HC RX MED GY IP 250 OP 250 PS 637: Performed by: INTERNAL MEDICINE

## 2023-02-10 PROCEDURE — 250N000011 HC RX IP 250 OP 636: Performed by: INTERNAL MEDICINE

## 2023-02-10 PROCEDURE — 87086 URINE CULTURE/COLONY COUNT: CPT | Performed by: STUDENT IN AN ORGANIZED HEALTH CARE EDUCATION/TRAINING PROGRAM

## 2023-02-10 RX ORDER — LISINOPRIL 20 MG/1
20 TABLET ORAL DAILY
Status: DISCONTINUED | OUTPATIENT
Start: 2023-02-10 | End: 2023-02-20 | Stop reason: HOSPADM

## 2023-02-10 RX ORDER — CEFTRIAXONE 1 G/1
1 INJECTION, POWDER, FOR SOLUTION INTRAMUSCULAR; INTRAVENOUS EVERY 24 HOURS
Status: DISCONTINUED | OUTPATIENT
Start: 2023-02-10 | End: 2023-02-11

## 2023-02-10 RX ORDER — ATORVASTATIN CALCIUM 20 MG/1
20 TABLET, FILM COATED ORAL DAILY
Status: DISCONTINUED | OUTPATIENT
Start: 2023-02-10 | End: 2023-02-20 | Stop reason: HOSPADM

## 2023-02-10 RX ORDER — ATORVASTATIN CALCIUM 20 MG/1
20 TABLET, FILM COATED ORAL DAILY
COMMUNITY

## 2023-02-10 RX ADMIN — LEVETIRACETAM 1000 MG: 500 TABLET, FILM COATED ORAL at 09:25

## 2023-02-10 RX ADMIN — CEFTRIAXONE SODIUM 1 G: 1 INJECTION, POWDER, FOR SOLUTION INTRAMUSCULAR; INTRAVENOUS at 14:29

## 2023-02-10 RX ADMIN — ATORVASTATIN CALCIUM 20 MG: 20 TABLET, FILM COATED ORAL at 14:28

## 2023-02-10 RX ADMIN — LISINOPRIL 20 MG: 20 TABLET ORAL at 14:28

## 2023-02-10 RX ADMIN — LEVETIRACETAM 1000 MG: 500 TABLET, FILM COATED ORAL at 20:26

## 2023-02-10 ASSESSMENT — ACTIVITIES OF DAILY LIVING (ADL)
ADLS_ACUITY_SCORE: 42

## 2023-02-10 NOTE — PROGRESS NOTES
[facility]    Medicine Progress Note - Hospitalist Service    Date of Admission:  2/9/2023    Assessment & Plan   Sam Barry is a 77 year old male admitted on 2/9/2023.      Suspected new onset seizure   * wife witnessed shaking followed by prolonged period of lethargy, confusion that slowly resolved  * lactic acidosis noted post episode  * CT/CTA head negative for acute findings   * hyper acute MRI negative for acute findings   -General neurology following, appreciate input  - continue keppra 1000mg BID ordered during admission  - EEG with no evidence of epileptic activity  -Await further recommendation from neurology regarding long-term plan on antiepileptic  -UA for evaluation of confusion abnormal, await culture, start on ceftriaxone  -PT/OT consulted for     HTN  Hyperlipidemia  -PTA on lisinopril, will resume lisinopril  -Resume PTA atorvastatin     Osteopenia  - hold alendronate while in the hospital     Mild cognitive impairment  Reported parkinsonian features  * wife reports shuffling gait, tremor, confusion  -Neurology following as above     Diet: Regular Diet Adult    DVT Prophylaxis: Pneumatic Compression Devices  Cardenas Catheter: Not present  Lines: None     Cardiac Monitoring: ACTIVE order. Indication: Tachyarrhythmias, acute (48 hours)  Code Status: Full Code      Clinically Significant Risk Factors Present on Admission             # Anion Gap Metabolic Acidosis: Highest Anion Gap = 20 mmol/L in last 2 days, will monitor and treat as appropriate   # Coagulation Defect: INR = 1.17 (Ref range: 0.85 - 1.15) and/or PTT = 33 Seconds (Ref range: 22 - 38 Seconds), will monitor for bleeding    # Hypertension: home medication list includes antihypertensive(s)   # Acute Respiratory Failure: Documented O2 saturation < 91%.  Continue supplemental oxygen as needed     # Overweight: Estimated body mass index is 26.81 kg/m  as calculated from the following:    Height as of this  "encounter: 1.753 m (5' 9.02\").    Weight as of this encounter: 82.4 kg (181 lb 10.5 oz).           Disposition Plan      Expected Discharge Date: 02/11/2023                  Jocelyne Fry MD  Hospitalist Service  [facility]  Securely message with [employer] (more info)  Text page via [employer] Paging/Directory   ______________________________________________________________________    Interval History   Patient seems to be confused, unable to give much history, unknown baseline no other nursing concerns.    Physical Exam   Vital Signs: Temp: 99.3  F (37.4  C) Temp src: Oral BP: (!) 151/103 Pulse: 96   Resp: 16 SpO2: 95 % O2 Device: None (Room air)    Weight: 181 lbs 10.54 oz    Exam:  Constitutional: Awake, alert but appears confused, unknown baseline. Appears comfortable  Head: Normocephalic. No masses, lesions, tenderness or abnormalities  ENT: ENT exam normal, no neck nodes or sinus tenderness  Cardiovascular: RRR.  No murmurs, no rubs or JVD  Respiratory: Normal WOB,b/l equal air entry, no wheezes or crackles   Gastrointestinal: Abdomen soft, non-tender. BS normal. No masses, organomegaly  : Deferred  Extremities : No edema , no clubbing or cyanosis    Neurologic: Cranial nerves II-XII grossly intact , power normal, Reflexes normal and symmetric. Sensation grossly WNL.      Medical Decision Making       52 MINUTES SPENT BY ME on the date of service doing chart review, history, exam, documentation & further activities per the note.      Data     I have personally reviewed the following data over the past 24 hrs:    9.8  \   13.4   / 243     136 101 8.6 /  105 (H)   3.5 26 0.99 \       Trop: <6 BNP: N/A       Procal: N/A CRP: N/A Lactic Acid: 2.7 (H)       INR:  1.17 (H) PTT:  33   D-dimer:  N/A Fibrinogen:  N/A       Imaging results reviewed over the past 24 hrs:   Recent Results (from the past 24 hour(s))   CT Head w/o Contrast    Narrative    CT OF THE HEAD WITHOUT CONTRAST   2/9/2023 2:37 " PM     COMPARISON: 9/1/2020.    HISTORY: Code stroke. Altered mental status.    TECHNIQUE: Axial CT images of the head from the skull base to the  vertex were acquired without IV contrast. Dose reduction techniques  were used.     FINDINGS:   INTRACRANIAL CONTENTS: No intracranial hemorrhage, extraaxial  collection, or mass effect. No CT evidence of acute infarct. Moderate  presumed chronic small vessel ischemic change with moderate  generalized volume loss..    VISUALIZED ORBITS/SINUSES/MASTOIDS: No significant orbital  abnormality. No significant paranasal sinus mucosal disease. No  significant middle ear or mastoid effusion.    OSSEOUS STRUCTURES/SOFT TISSUES: No significant abnormality.      Impression    IMPRESSION:  1.  No acute intracranial abnormality.    PENNIE AGUSTIN MD         SYSTEM ID:  [id number]   CTA Head Neck w Contrast    Narrative    CT ANGIOGRAM OF THE HEAD AND NECK WITH CONTRAST  2/9/2023 2:43 PM     COMPARISON: Head CT 9/21/2020.    HISTORY: Code stroke. Altered mental status.    TECHNIQUE:    Precontrast localizing scans were followed by CT angiography with an  injection of 75mL Isovue-370 nonionic intravenous contrast material  with scans through the head and neck.  Images were transferred to a  separate 3-D workstation where multiplanar reformations and 3-D images  were created. Estimates of carotid stenoses are made relative to the  distal internal carotid artery diameters except as noted. Dose  reduction techniques were used.    FINDINGS:   HEAD CTA:  ANTERIOR CIRCULATION: Minimal plaque within the carotid siphons. No  significant stenosis or occlusion. Standard [place] of [place] anatomy.    POSTERIOR CIRCULATION: No significant stenosis or occlusion. Balanced  vertebral arteries supply a normal basilar artery.    ANEURYSM/VASCULAR MALFORMATION: None.    NECK CTA:  RIGHT CAROTID: No measurable stenosis in the right ICA based on NASCET  criteria. Tortuous and mildly ectatic distal  cervical ICA.    LEFT CAROTID: No measurable stenosis in the left ICA based on NASCET  criteria. Tortuous and mildly ectatic distal cervical ICA.     VERTEBRAL ARTERIES: Balanced vertebral arteries are patent in the neck  and into the head.     AORTIC ARCH: Classic aortic arch anatomy with no significant stenosis  at the origin of the great vessels.      Impression    IMPRESSION:    HEAD CTA:  1.  No large artery stenosis or occlusion. No aneurysm.    NECK CTA:  1.  No measurable carotid or vertebral artery stenosis.  2.  Mildly ectatic and tortuous distal cervical internal carotid  arteries.    The results were communicated to Dr. Allen at 3:02 PM on 2/9/2023.    PENNIE AGUSTIN MD         SYSTEM ID:  [id number]   MR Brain w/o Contrast    Narrative    MRI BRAIN WITHOUT CONTRAST  2/9/2023 4:01 PM    HISTORY: Hyperacute MRI - please start with DWI then FLAIR and push  images through as they come.    TECHNIQUE: Multiplanar, multisequence MRI of the brain without  gadolinium IV contrast material.      COMPARISON: Same day head CT      Impression    IMPRESSION:   Motion limited exam. No evidence of acute ischemia or hemorrhage.  Volume loss and nonspecific confluent white matter hypoattenuation  which presumably represents advanced chronic small vessel ischemic  change. 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    AISSATOU GALLOWAY MD         SYSTEM ID:  [id number]   XR Chest 2 Views    Narrative    EXAM: XR CHEST 2 VIEWS  LOCATION: [facility]  DATE/TIME: 2/10/2023 2:15 AM    INDICATION: eval for infiltrate  COMPARISON: None.      Impression    IMPRESSION: No acute process. Kyphotic view. Low lung volumes. Elevation hemidiaphragms. No pleural effusions. Heart size normal.     Recent Labs   Lab 02/10/23  0803 02/09/23  1428   WBC 9.8 11.0   HGB 13.4 13.8   MCV 90 90    277   INR  --  1.17*    138   POTASSIUM 3.5 3.8   CHLORIDE 101 98   CO2 26 20*   BUN 8.6 10.1   CR 0.99 1.09   ANIONGAP 9  20*   KATIE 9.4 9.9   * 159*

## 2023-02-10 NOTE — PROGRESS NOTES
Pt arrived to this unit at ~1815. A&O x2, VSS except BP high. RA. Incontinent of B&B. Denied pain. Saline lock. Skin intact.

## 2023-02-10 NOTE — PROGRESS NOTES
[facility]  PORTABLE COMPLETED. PT IS VERY DYSARTHRIC, CONFUSED AND NOT ORIENTED DURING THE EXAM. PT WAS TRYING TO TAKE EEG WIRES OFF MULTIPLE TIMES.

## 2023-02-10 NOTE — PLAN OF CARE
Goal Outcome Evaluation: No Change    Reason for Admission: AMS & Dysarthria     Cognitive/Mentation: A/Ox 3 ex- time, place, & situation  Neuros/CMS: Intact ex confusion, mild aphasia, Slight L droop, gen weakness  VS: VSS  Tele: NSR w PACs  GI: BS + flatus, last BM ????- Incontinent.  : Incontinent- external cath on   Pulmonary: LS clear equal   Pain: denies    Drains/Lines: SL  Skin: clean dry inatact   Activity: Assist x 2 with lift or stand and pivot   Diet: reg with thin liquids. Takes pills whole    Therapies recs: pending  Discharge: pending    Aggression Stoplight Tool: green/yellow- agitation     End of shift summary: Admitted 2/9 around 6:30 pm. Seizure precautions. Xray performed around 2:30 am.

## 2023-02-10 NOTE — PLAN OF CARE
Pt here with suspected seizure. Disoriented to time, place, situation. Neuros intact, confused, generalized weakness, slight left droop. VSS. Tele NSR. IDDSI level regular diet with thin liquids. Takes pills crushed in applesauce. Up with Ax2, lift. Incontinent of bowel and bladder, pulls off external catheter. Appears painful when repositioning. Pt scoring green on the Aggression Stop Light Tool. Plan for discharge pending.

## 2023-02-10 NOTE — UTILIZATION REVIEW
Admission Status; Secondary Review Determination     Admission Date: 2/9/2023  2:21 PM       Under the authority of the Utilization Management Committee, the utilization review process indicated a secondary review on the above patient.  The review outcome is based on review of the medical records, discussions with staff, and applying clinical experience noted on the date of the review.        (x)      Inpatient Status Appropriate - This patient's medical care is consistent with medical management for inpatient care and reasonable inpatient medical practice.       RATIONALE FOR DETERMINATION      Brief clinical presentation, information copied from the chart, abbreviated and edited for relevant content:     Sam Barry is a 77 year old male admitted on 2/9/203 for Suspected new onset seizure. wife witnessed shaking followed by prolonged period of lethargy, confusion that slowly resolved. Had lactic acidosis noted post episode. CT/CTA head negative for acute findings. MRI negative. Neurology consulted. Initial EEG negative, but may need repeated.   Plan to continue keppra 1000mg BID ordered during admission. Await further recommendation from neurology regarding long-term plan on antiepileptic  UA for evaluation of confusion abnormal, await culture, start on IV ceftriaxone. PT/OT consulted for disposition. Not able to discharge today.       At the time of admission with the information available to the attending physician, more than 2 nights hospital complex care was anticipated. Also, there was a risk of adverse outcome if patient was treated outpatient or observation. High intensity of services anticipated. Inpatient admission appropriate based on Medicare guidelines.       The information on this document is developed by the utilization review team in order for the business office to ensure compliance.  This only denotes the appropriateness of proper admission status and does not reflect the quality of care  rendered.         The definitions of Inpatient Status and Observation Status used in making the determination above are those provided in the CMS Coverage Manual, Chapter 1 and Chapter 6, section 70.4.      Sincerely,      Yessenia Lemon MD   Utilization Review/ Case Management  [facility].

## 2023-02-10 NOTE — CONSULTS
[facility]    Neurology Consultation    Sam Barry MRN# [id number]   YOB: 1945 Age: 77 year old    Code Status:Full Code   Date of Admission: 2/9/2023  Date of Consult:02/10/2023                                                                                       Assessment and Plan:                                         #.  Episode of alteration/loss of consciousness.  Based on review of descriptions of the event, this could be syncope with convulsive activity or seizure.  -- Additional information from wife would be helpful.  Left message for her to call back.  EEG was not revealing of epileptic abnormality, however interictal EEGs are frequently negative.  Reasonable to continue levetiracetam for now until able to get further history from wife.  #.  History of cognitive impairment/he is impaired-not sure what baseline is  Obtain further information from family/wife    #. DVT Prophylaxis  -Per hospitalist service  #. PT/OT/Speech  --Start evaluation to assist with discharge planning        ----------------------------------------------------------------------------------  ----------------------------------------------------------------------------------  Reason for consult: as above       Chief Complaint:   Not aware of why he is in the hospital  History is obtained from the patient / chart       History of Present Illness:   This patient is a 77 year old male who presents with episode of alteration of consciousness    Notes reviewed:    Hospitalist:  history of mild cognitive impairment, hypertension. He presents via EMS. His wife reports a progressive worsening gait for the last 3 months to the point where he only has a shuffling gait at this point. She notes that today they are planning to go out to eat but that he suddenly had slurred speech, confusion. He became weak so she helped him sit to the ground. He did have loss of consciousness at that point and urinary  "incontinence. She reported to ED and EMS providers that he had a period of shaking, but to neurology she denied this. Upon waking he was agitated and combative. On EMS arrival he was quite lethargic. While in the emergency department he remained lethargic with slowly improving alertness       Stroke service:  past medical history of arthritis, BPH, HLD, prediabetes, mild cognitive impairment, wife reports progressive worsening gait x 3 months to the point where he can now hardly walk even with a walker (shuffling)-wife was concerned of Parkinson's as she is a retired RN, not on AC and no history of stroke/MI/or surgery in the past 3 months, no history of ICH or brain mets, or current bleeding or black/tarry stools. No alcohol use. No prior history of seizures.     He presented to [facility] ED today via EMS after wife was about to take him to a restaurant to eat around 1340 this afternoon and he had acute onset of altered mental status, dysarthric speech that she couldn't understand \"talking gibberish\" he then was weak and unsteady so she attempted to help him sit down. He then lost consciousness and had urinary incontinence, no tremors or shaking. She did call EMS and he was asleep and not able to wake up. He was hypertensive SBP 190s. .     When he woke up he was agitated and combative but still nonverbal not answering any questions.  He vomited. No evidence of tongue biting. Given ativan IV 1 mg before CT. He was CTH negative for bleed. CTA without severe stenosis or LVO. Symptoms were concerning for seizure with postictal confusion/somnolence but given global aphasia decided to pursue hyperacute MRI. Prior to MRI I did review any potential contraindications to TNK with his wife and she helped fill out the checklist. MRI did not shows any evidence of stroke. Lactate 3.8. Following MRI he was awake and answered clearly his full name and said he was feeling goodpast medical history of arthritis, BPH, HLD, " "prediabetes, mild cognitive impairment, wife reports progressive worsening gait x 3 months to the point where he can now hardly walk even with a walker (shuffling)-wife was concerned of Parkinson's as she is a retired RN, not on AC and no history of stroke/MI/or surgery in the past 3 months, no history of ICH or brain mets, or current bleeding or black/tarry stools. No alcohol use. No prior history of seizures.     He presented to [facility] ED today via EMS after wife was about to take him to a restaurant to eat around 1340 this afternoon and he had acute onset of altered mental status, dysarthric speech that she couldn't understand \"talking gibberish\" he then was weak and unsteady so she attempted to help him sit down. He then lost consciousness and had urinary incontinence, no tremors or shaking. She did call EMS and he was asleep and not able to wake up. He was hypertensive SBP 190s. .     When he woke up he was agitated and combative but still nonverbal not answering any questions.  He vomited. No evidence of tongue biting. Given ativan IV 1 mg before CT. He was CTH negative for bleed. CTA without severe stenosis or LVO. Symptoms were concerning for seizure with postictal confusion/somnolence but given global aphasia decided to pursue hyperacute MRI. Prior to MRI I did review any potential contraindications to TNK with his wife and she helped fill out the checklist. MRI did not shows any evidence of stroke. Lactate 3.8. Following MRI he was awake and answered clearly his full name and said he was feeling good  Global aphasia and somnolence following loss of consciousness and urinary incontinence, suspect seizure in setting of lactic acidosis versus rule out metabolic/infectious/toxic etiology (lower suspicion given significant improvement following ativan and MRI)     Recommendations  -Keppra 2 g x1 then maintenance 1 g BID  -consult general neurology  PCP records-Dr. Yelitza Aldana- 2020 1-2022 " records-problem list includes cognitive impairment overnight, ED evaluation in 2021 for left-sided shaking/twitching       Past Medical History:     Past Medical History:   Diagnosis Date     Arthritis     lower leg     BPH (benign prostatic hyperplasia)      Hyperlipidemia      IFG (impaired fasting glucose)      MCI (mild cognitive impairment)     with memory loss     Osteopenia      Tachycardia      Vitamin D deficiency          Past Surgical History:     Past Surgical History:   Procedure Laterality Date     APPENDECTOMY       CYSTOSCOPY, TRANSURETHRAL RESECTION (TUR) PROSTATE, COMBINED N/A 9/18/2018    Procedure: COMBINED CYSTOSCOPY, TRANSURETHRAL RESECTION (TUR) PROSTATE;  CYSTOSCOPY, TRANSURETHRAL RESECTION OF THE PROSTATE.    EBL: 30mL;  Surgeon: Fredy Feliz MD;  Location:  OR          Social History:     Social History     Socioeconomic History     Marital status:    Tobacco Use     Smoking status: Never     Smokeless tobacco: Never   Substance and Sexual Activity     Alcohol use: No     Drug use: No     Patient denies smoking, no significant alcohol intake, denies illicit drugs use       Family History:   No family history on file.  Reviewed and not felt to be contributory.        Home Medications:     Prior to Admission Medications   Prescriptions Last Dose Informant Patient Reported? Taking?   Multiple Vitamin (MULTI-VITAMINS) TABS  Self Yes No   Sig: Take 1 tablet by mouth daily    VITAMIN D, CHOLECALCIFEROL, PO  Self Yes No   Sig: Take 1 tablet by mouth daily   alendronate (FOSAMAX) 70 MG tablet   Yes Yes   Sig: Take 70 mg by mouth every 7 days   lisinopril (ZESTRIL) 20 MG tablet   Yes Yes   Sig: Take 20 mg by mouth daily      Facility-Administered Medications: None          Allergy:   No Known Allergies       Inpatient Medications:   Scheduled Meds:    levETIRAcetam  1,000 mg Oral BID     sodium chloride (PF)  3 mL Intracatheter Q8H     PRN Meds: acetaminophen **OR** acetaminophen,  "hydrALAZINE, labetalol, lidocaine 4%, lidocaine (buffered or not buffered), melatonin, naloxone **OR** naloxone **OR** naloxone **OR** naloxone, ondansetron **OR** ondansetron, oxyCODONE, oxyCODONE IR, polyethylene glycol, prochlorperazine **OR** prochlorperazine **OR** prochlorperazine, senna-docusate **OR** senna-docusate, sodium chloride (PF)          Physical Exam:   Physical Exam   Vitals:  Height:5' 9.016\"  Weight:181 lbs 10.54 oz   Temp: 98.6  F (37  C) Temp src: Axillary BP: (!) 131/91 Pulse: 84   Resp: 16 SpO2: 95 % O2 Device: None (Room air)    General Appearance:  No acute distress/  Neuro:       Mental Status Exam:    Lethargic, easily arousable.  Oriented to hospital but not name.  Not oriented to month, difficulty with year.  Hesitant responses.  Mildly dysarthric.  Language slow but intact.  Able to follow simple commands       Cranial Nerves:   Vision/visual fields intact.  Facial strength is intact.  Extraocular movements intact.           Motor:   Tone mildly rigid x4.  Bulk normal x4.  Unable to fully cooperate with strength testing but antigravity strength is grossly intact.          Reflexes: Symmetrically reduced, plantar signs mute       Sensory: Sensation to light touch and vibration intact x4                   Coordination: Grossly intact x4-mildly irregular and slow       Gait: Unable to be tested safely  Neck: no nuchal rigidityExtremities: No clubbing, no cyanosis, no edema       Data:   ROUTINE IP LABS   CBC RESULTS:     Recent Labs   Lab 02/10/23  0803 02/09/23  1428   WBC 9.8 11.0   RBC 4.46 4.57   HGB 13.4 13.8   HCT 40.0 41.3    277     Basic Metabolic Panel:   Recent Labs   Lab Test 02/10/23  0803 02/09/23  1428 09/21/20  2053    138 141   POTASSIUM 3.5 3.8 3.6   CHLORIDE 101 98 108   CO2 26 20* 26   BUN 8.6 10.1 15   CR 0.99 1.09 1.09   * 159* 116*   KATIE 9.4 9.9 9.6     Thyroid Panel:  Recent Labs   Lab Test 09/21/20 2053   TSH 0.71      B12/TSH 8/22 was " normal.  EEG mild slowing-no epileptic abnormalities     IMAGING:   All imaging studies were reviewed personally  HEAD CTA:  1.  No large artery stenosis or occlusion. No aneurysm.     NECK CTA:  1.  No measurable carotid or vertebral artery stenosis.  2.  Mildly ectatic and tortuous distal cervical internal carotid  arteries  MRI BRAIN WITHOUT CONTRAST  2/9/2023 4:01 PM     HISTORY: Hyperacute MRI - please start with DWI then FLAIR and push  images through as they come.     TECHNIQUE: Multiplanar, multisequence MRI of the brain without  gadolinium IV contrast material.       COMPARISON: Same day head CT                                                                      IMPRESSION:   Motion limited exam. No evidence of acute ischemia or hemorrhage.  Volume loss and nonspecific confluent white matter hypoattenuation  which presumably represents advanced chronic small vessel ischemic  change. 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  [facility]  Office [phone]

## 2023-02-11 ENCOUNTER — APPOINTMENT (OUTPATIENT)
Dept: OCCUPATIONAL THERAPY | Facility: CLINIC | Age: 78
DRG: 689 | End: 2023-02-11
Attending: INTERNAL MEDICINE
Payer: COMMERCIAL

## 2023-02-11 PROCEDURE — 250N000011 HC RX IP 250 OP 636: Performed by: INTERNAL MEDICINE

## 2023-02-11 PROCEDURE — 250N000013 HC RX MED GY IP 250 OP 250 PS 637: Performed by: PHYSICIAN ASSISTANT

## 2023-02-11 PROCEDURE — 97535 SELF CARE MNGMENT TRAINING: CPT | Mod: GO

## 2023-02-11 PROCEDURE — 120N000001 HC R&B MED SURG/OB

## 2023-02-11 PROCEDURE — 250N000013 HC RX MED GY IP 250 OP 250 PS 637: Performed by: INTERNAL MEDICINE

## 2023-02-11 PROCEDURE — 99232 SBSQ HOSP IP/OBS MODERATE 35: CPT | Performed by: INTERNAL MEDICINE

## 2023-02-11 PROCEDURE — 250N000013 HC RX MED GY IP 250 OP 250 PS 637: Performed by: STUDENT IN AN ORGANIZED HEALTH CARE EDUCATION/TRAINING PROGRAM

## 2023-02-11 PROCEDURE — 250N000011 HC RX IP 250 OP 636: Performed by: HOSPITALIST

## 2023-02-11 PROCEDURE — 97165 OT EVAL LOW COMPLEX 30 MIN: CPT | Mod: GO

## 2023-02-11 RX ORDER — MEROPENEM 1 G/1
1 INJECTION, POWDER, FOR SOLUTION INTRAVENOUS EVERY 8 HOURS
Status: DISCONTINUED | OUTPATIENT
Start: 2023-02-11 | End: 2023-02-20 | Stop reason: HOSPADM

## 2023-02-11 RX ADMIN — LEVETIRACETAM 1000 MG: 500 TABLET, FILM COATED ORAL at 08:02

## 2023-02-11 RX ADMIN — POLYETHYLENE GLYCOL 3350 17 G: 17 POWDER, FOR SOLUTION ORAL at 18:06

## 2023-02-11 RX ADMIN — ATORVASTATIN CALCIUM 20 MG: 20 TABLET, FILM COATED ORAL at 08:02

## 2023-02-11 RX ADMIN — MEROPENEM 1 G: 1 INJECTION, POWDER, FOR SOLUTION INTRAVENOUS at 22:55

## 2023-02-11 RX ADMIN — SENNOSIDES AND DOCUSATE SODIUM 1 TABLET: 50; 8.6 TABLET ORAL at 14:00

## 2023-02-11 RX ADMIN — CEFTRIAXONE SODIUM 1 G: 1 INJECTION, POWDER, FOR SOLUTION INTRAMUSCULAR; INTRAVENOUS at 13:39

## 2023-02-11 RX ADMIN — LISINOPRIL 20 MG: 20 TABLET ORAL at 08:02

## 2023-02-11 ASSESSMENT — ACTIVITIES OF DAILY LIVING (ADL)
ADLS_ACUITY_SCORE: 41
ADLS_ACUITY_SCORE: 41
ADLS_ACUITY_SCORE: 49
ADLS_ACUITY_SCORE: 49
ADLS_ACUITY_SCORE: 41
ADLS_ACUITY_SCORE: 49
ADLS_ACUITY_SCORE: 41
ADLS_ACUITY_SCORE: 41
ADLS_ACUITY_SCORE: 49

## 2023-02-11 NOTE — PLAN OF CARE
Pt here with Seizure. A&O to self, confused and forgetful. Neuros L droop, generalized weakness. VSS. Tele NSR. Regular diet, thin liquids. Takes pills crushed in applesauce. Up with A2/lift. Incontinent B&B. Denies pain. Pt scoring green on the Aggression Stop Light Tool. Discharge pending.

## 2023-02-11 NOTE — PROGRESS NOTES
"   02/11/23 1200   Appointment Info   Signing Clinician's Name / Credentials (OT) Tracy Owen, OTR/L   Living Environment   People in Home spouse;child(crystal), adult  (Son 28 y/o)   Current Living Arrangements house   Home Accessibility   (2 FRANK but pt has ramp- per spouse, pt has been having difficulties with this)   Transportation Anticipated family or friend will provide   Living Environment Comments Spouse present to provide PLOF as pt is questionable historian. BR SET UP: Walk in shower, shower chair but pt prefers to stand, plans to install grab bars; has RTS with toilet arms - pt does no like to use. All needs met on one level. Pt has been using FWW and has quad cane. Pt's spouse home 24/7 to assist with I/ADLs as needed - she is not working right now.   Self-Care   Usual Activity Tolerance moderate   Regular Exercise No   Equipment Currently Used at Home walker, standard;cane, quad;shower chair;raised toilet seat   Fall history within last six months no   Activity/Exercise/Self-Care Comment Pt sup A/SBA with showers from spouse, some assist for TB dressing, pt ind with toilet transfers/toileting.   Instrumental Activities of Daily Living (IADL)   IADL Comments All IADLs per spouse - pt does not drive.   General Information   Onset of Illness/Injury or Date of Surgery 02/11/23   Referring Physician Jocelyne Fry MD   Patient/Family Therapy Goal Statement (OT) Not stated   Additional Occupational Profile Info/Pertinent History of Current Problem Per chart: \"Sam Barry is a 77 year old male with pertinent past medical history of arthritis, BPH, HLD, prediabetes, mild cognitive impairment, wife reports progressive worsening gait x 3 months to the point where he can now hardly walk even with a walker (shuffling)-wife was concerned of Parkinson's as she is a retired RN, not on AC and no history of stroke/MI/or surgery in the past 3 months, no history of ICH or brain mets, or current bleeding or black/tarry " "stools. No alcohol use. No prior history of seizures.\"   Existing Precautions/Restrictions fall   Limitations/Impairments safety/cognitive   Cognitive Status Examination   Orientation Status place;time;person   Cognitive Status Comments Some confusion noted   Visual Perception   Visual Impairment/Limitations WFL   Sensory   Sensory Quick Adds sensation intact   Sensory Comments BUEs   Pain Assessment   Patient Currently in Pain   (Yes, occasional back spasms noted.)   Posture   Posture forward head position;protracted shoulders;kyphosis   Range of Motion Comprehensive   Comment, General Range of Motion BUEs WFL   Strength Comprehensive (MMT)   Comment, General Manual Muscle Testing (MMT) Assessment BUEs WFL   Coordination   Upper Extremity Coordination Left UE impaired;Right UE impaired   Coordination Comments impaired finger to thumb opposition of B hands   Bed Mobility   Comment (Bed Mobility) Min-Mod A x 2   Transfers   Transfers sit-stand transfer;bed-chair transfer   Transfer Skill: Bed to Chair/Chair to Bed   Transfer Comments Min A   Sit-Stand Transfer   Sit/Stand Transfer Comments Min A   Balance   Balance Comments Somewhat unsteady on feet, short steps/shuffling gait noted   Activities of Daily Living   BADL Assessment/Intervention lower body dressing;feeding   Lower Body Dressing Assessment/Training   Comment, (Lower Body Dressing) Dep A to doff/don B socks sitting EOB   Eating/Self Feeding   Comment, (Feeding) Set up A   Clinical Impression   Criteria for Skilled Therapeutic Interventions Met (OT) Yes, treatment indicated   OT Diagnosis Decreased ind with I/ADLs   OT Problem List-Impairments impacting ADL problems related to;activity tolerance impaired;balance;cognition;coordination;mobility;strength;pain   Assessment of Occupational Performance 3-5 Performance Deficits   Identified Performance Deficits TB dressing, toileting/toilet transfers, bed mobility, showering   Planned Therapy Interventions (OT) " ADL retraining;IADL retraining;transfer training   Clinical Decision Making Complexity (OT) low complexity   Anticipated Equipment Needs Upon Discharge (OT)   (TBD)   Risk & Benefits of therapy have been explained patient;spouse/significant other   OT Total Evaluation Time   OT Eval, Low Complexity Minutes (50631) 13   OT Goals   Therapy Frequency (OT) Daily   OT Predicted Duration/Target Date for Goal Attainment 02/23/23   OT Goals Hygiene/Grooming;Upper Body Dressing;Lower Body Dressing;Toilet Transfer/Toileting;Cognition;OT Goal 1   OT: Hygiene/Grooming supervision/stand-by assist  (FWW, sinkside)   OT: Upper Body Dressing Minimal assist   OT: Lower Body Dressing Minimal assist  (FWW)   OT: Toilet Transfer/Toileting Modified independent;toilet transfer;cleaning and garment management  (FWW)   OT: Cognitive Patient/caregiver will verbalize understanding of cognitive assessment results/recommendations as needed for safe discharge planning   Self-Care/Home Management   Self-Care/Home Mgmt/ADL, Compensatory, Meal Prep Minutes (06859) 24   Symptoms Noted During/After Treatment (Meal Preparation/Planning Training) increased pain;fatigue   Treatment Detail/Skilled Intervention Pt greeted in supine, agreeable to OT, pt pleasant. Pt's spouse arrival towards end of session. Pt demo Min-Mod A x 2 for bed mobility supine>sitting EOB with BLE assist and some assist to come into upright sitting, VCs/tactile cues for hand placement sitting EOB. Pt Dep A to doff/don B socks sitting EOB. Pt comleted sit>stand from EOB with Min A, FWW, VCs/tactile for safety to push up from bed with at least one hand. Pt completed room level functional mobility with Min A, short/shuffling gait noted, tacitle/VCs for walker advancement, no overt LOB noted - mobilized approx. 7 feet. Pt demo stand>sit in chair with Min A, FWW, VCs/hand over hand to guide hands for placement when going to sit. Pt up in chair with needs met, alarm set, spouse present.    OT Discharge Planning   OT Plan Standing g/h at chair vs EOB for standing balance; 9 hole peg test for FMC; Progress functional mobility to assess toilet transfer (may need commode over toilet)   OT Discharge Recommendation (DC Rec) Transitional Care Facility; ARU   OT Rationale for DC Rec Pt currently functioning below baseline level, primarily demo limitations in strength, functional mobility, cognition, and ADL independence. Pt currently A x 1 for all functional mobility and requires increased assist for ADLs. Pt would benefit from continued skilled IP OT services to address ADLs/functional mobility. Rec ARU for further strengthening and ADL ind prior to return home. Pt demonstrates ability to tolerate 3 hours of rehab, has supportive spouse and pt is motivated to get stronger.   OT Brief overview of current status Min-Mod A x 2 bed mobility; Min A sit<>stands w/FWW; Min A functional mobility   Total Session Time   Timed Code Treatment Minutes 24   Total Session Time (sum of timed and untimed services) 37

## 2023-02-11 NOTE — PROGRESS NOTES
[facility]  Neurology Daily Note      Admission Date:2/9/2023   Date of service: 02/11/2023   Hospital Day: 3                                                   Assessment and Plan:   #.  Episode of alteration/loss of consciousness.  Based on review of descriptions of the event, this could be syncope with convulsive activity or seizure.  I am more inclined to think this was a orthostatic syncopal episode with convulsive activity as he had remained seated during the event  Urinary tract infection was likely contributing factor to the syncope  -- Additional information from wife reviewed today   EEG was not revealing of epileptic abnormality, however interictal EEGs are frequently negative.  Discussed unclear need for continuing levetiracetam as this was an event that may have been syncopal in nature in the setting of urinary tract infection and decreased fluid intake  Discontinue levetiracetam.  Management of UTI and discharge plan per hospitalist service.  #.  History of cognitive impairment.  Also in the setting of tremors, walking difficulty over the last 6 to 12 months.  He has no clear findings of Parkinson's on today's exam however hospital setting is not ideal evaluation.  Would be best to see him in follow-up in 4 to 6 weeks after recovery from the current event.  I would be glad to see him in my office.     Will sign off.  Please contact General Neurology service if questions related to neurologic status or follow up needed during this admission.         Interval History:   Wife is at bedside.  She is able to relate the episode to me.  She reports that she noted that he was acting confused as he was standing up with his walker to get ready to leave.  She saw him start to collapse and assisted him into a chair.  He was was taking deep breaths and speaking gibberish.  Subsequently he became less responsive and she observed shaking-like movements of his extremities.  She reports that this lasted for  about 20 minutes while they were waiting for EMS services to arrive.  He has never had an episode like this before.  She mentions a decline over the last 6 to 12 months where he has been more unsteady on his feet, requiring a walker.  Her boss had purchased a ramp for the household a couple of months ago to assist with his mobility.  She has been observing more shaking.  He does not eat regularly.  Somewhat inconsistent history but it sounds like he had not had much to eat and not much to drink the day of this episode.         Medications:   Scheduled Meds:    atorvastatin  20 mg Oral Daily     cefTRIAXone  1 g Intravenous Q24H     levETIRAcetam  1,000 mg Oral BID     lisinopril  20 mg Oral Daily     sodium chloride (PF)  3 mL Intracatheter Q8H     PRN Meds: acetaminophen **OR** acetaminophen, hydrALAZINE, labetalol, lidocaine 4%, lidocaine (buffered or not buffered), melatonin, naloxone **OR** naloxone **OR** naloxone **OR** naloxone, ondansetron **OR** ondansetron, oxyCODONE, oxyCODONE IR, polyethylene glycol, prochlorperazine **OR** prochlorperazine **OR** prochlorperazine, senna-docusate **OR** senna-docusate, sodium chloride (PF)        Physical Exam:   Vitals: Temp: 98.6  F (37  C) Temp src: Oral BP: (!) 136/93 Pulse: 82   Resp: 16 SpO2: 95 % O2 Device: None (Room air)    Vital Signs with Ranges: Temp:  [97.6  F (36.4  C)-98.8  F (37.1  C)] 98.6  F (37  C)  Pulse:  [] 82  Resp:  [16] 16  BP: (115-148)/(75-94) 136/93  SpO2:  [93 %-95 %] 95 %    General Appearance:  No acute distress  Neuro: Alert and oriented to hospital but not name.  Oriented to month and year with cues.  Aware that the eagles are plain in the [place] but does not know which team they are planning  Speech otherwise normal.  Hesitant answers with speech.  No tremors noted today.  AMRs are intact with good amplitude and speed.  Strength intact x4                       Extremities: No clubbing, no cyanosis, no edema       Data:    ROUTINE IP LABS (Last 3results)  CBC RESULTS:     Recent Labs   Lab Test 02/10/23  0803 02/09/23  1428 09/21/20 2053   WBC 9.8 11.0 10.5   RBC 4.46 4.57 4.70   HGB 13.4 13.8 14.7   HCT 40.0 41.3 42.0    277 255     Basic Metabolic Panel:  Recent Labs   Lab Test 02/10/23  0803 02/09/23 1428 09/21/20 2053    138 141   POTASSIUM 3.5 3.8 3.6   CHLORIDE 101 98 108   CO2 26 20* 26   BUN 8.6 10.1 15   CR 0.99 1.09 1.09   * 159* 116*   KATIE 9.4 9.9 9.6     Thyroid Panel:  Recent Labs   Lab Test 09/21/20 2053   TSH 0.71           IMAGING:   All imaging studies were reviewed personally  CT imaging does reveal significant amount of central and cortical atrophy          Lucero Baker M.D.  Neurologist  [facility]  Office [phone]

## 2023-02-11 NOTE — PLAN OF CARE
Pt here with seizure. Disoriented to situation, time. Neuros intact, generalized weakness, slight left droop. VSS. Tele NSR. IDDSI level regular diet with thin liquids. Takes pills crushed in applesauce. Up with Ax2, lift. Incontinent of bowel and bladder. Appears painful with repositioning at times, prn heat packs administered. Pt scoring green on the Aggression Stop Light Tool. Plan for discharge pending.

## 2023-02-11 NOTE — PROGRESS NOTES
[facility]    Medicine Progress Note - Hospitalist Service    Date of Admission:  2/9/2023    Assessment & Plan   Sam Barry is a 77 year old male admitted on 2/9/2023.   Per report patient had been acting confused, witnessed by wife who started to collapse while he was standing and wife had assisted him to the chair, he became less responsive and see observed some shaking like movements in his extremities and so EMS was called     Urinary tract infection  Septic encephalopathy  -Prior to admission he had been acting confused per wife, speaking gibberish and became less responsive with subsequent shaking-like movement in his extremities  -* lactic acidosis noted post episode  * CT/CTA head negative for acute findings   * hyper acute MRI negative for acute findings   -General neurology following, appreciate input  -UA was abnormal and so patient was started on ceftriaxone, continue, urine culture growing E. coli so far, sensitivity pending  -Initially started on Keppra, after reviewing more history from wife and with negative EEG, neurology more convinced towards UTI contributing to his mental status changes and possible loss of consciousness and so Keppra has since been discontinued  -Patient continues to remain confused however today slightly better compared to yesterday  -PT/OT consulted for safe disposition recommendation, currently OT recommending TCU, await recommendation from PT  -Fall precautions     HTN  Hyperlipidemia  -Continue PTA lisinopril with holding parameters and PTA atorvastatin     Osteopenia  - hold alendronate while in the hospital     Mild cognitive impairment  Reported parkinsonian features  * wife reports shuffling gait, tremor, confusion and possible cognitive impairment over the last 6 to 12 months.    -Neurology recommends plan for follow-up in 4 to 6 weeks after recovery from current event    Diet: Regular Diet Adult  Room Service    DVT Prophylaxis:  "Pneumatic Compression Devices  Cardenas Catheter: Not present  Lines: None     Cardiac Monitoring: None  Code Status: Full Code      Clinically Significant Risk Factors             # Anion Gap Metabolic Acidosis: Highest Anion Gap = 20 mmol/L in last 2 days, will monitor and treat as appropriate             # Overweight: Estimated body mass index is 26.81 kg/m  as calculated from the following:    Height as of this encounter: 1.753 m (5' 9.02\").    Weight as of this encounter: 82.4 kg (181 lb 10.5 oz)., PRESENT ON ADMISSION         Disposition Plan      Expected Discharge Date: 02/13/2023        Discharge Comments: PT/OT to see          Jocelyne Fry MD  Hospitalist Service  [facility]  Securely message with [employer] (more info)  Text page via [employer] Paging/Directory   ______________________________________________________________________    Interval History   Patient continues to remain confused but more coherent today.  Denies any new complaints.  No new nursing concerns    Physical Exam   Vital Signs: Temp: 97.6  F (36.4  C) Temp src: Oral BP: 111/78 Pulse: 98   Resp: 16 SpO2: 96 % O2 Device: None (Room air)    Weight: 181 lbs 10.54 oz    Exam:  Constitutional: Awake, alert but appears confused, unknown baseline. Appears comfortable  Head: Normocephalic. No masses, lesions, tenderness or abnormalities  ENT: ENT exam normal, no neck nodes or sinus tenderness  Cardiovascular: RRR.  No murmurs, no rubs or JVD  Respiratory: Normal WOB,b/l equal air entry, no wheezes or crackles   Gastrointestinal: Abdomen soft, non-tender. BS normal. No masses, organomegaly  : Deferred  Extremities : No edema , no clubbing or cyanosis    Neurologic: Cranial nerves II-XII grossly intact , power normal, Reflexes normal and symmetric. Sensation grossly WNL.      Medical Decision Making     45 MINUTES SPENT BY ME on the date of service doing chart review, history, exam, documentation & further activities per the " note.      Data         Imaging results reviewed over the past 24 hrs:   No results found for this or any previous visit (from the past 24 hour(s)).  Recent Labs   Lab 02/10/23  0803 02/09/23  1428   WBC 9.8 11.0   HGB 13.4 13.8   MCV 90 90    277   INR  --  1.17*    138   POTASSIUM 3.5 3.8   CHLORIDE 101 98   CO2 26 20*   BUN 8.6 10.1   CR 0.99 1.09   ANIONGAP 9 20*   KATIE 9.4 9.9   * 159*

## 2023-02-12 ENCOUNTER — APPOINTMENT (OUTPATIENT)
Dept: OCCUPATIONAL THERAPY | Facility: CLINIC | Age: 78
DRG: 689 | End: 2023-02-12
Attending: INTERNAL MEDICINE
Payer: COMMERCIAL

## 2023-02-12 ENCOUNTER — APPOINTMENT (OUTPATIENT)
Dept: PHYSICAL THERAPY | Facility: CLINIC | Age: 78
DRG: 689 | End: 2023-02-12
Attending: INTERNAL MEDICINE
Payer: COMMERCIAL

## 2023-02-12 LAB — BACTERIA UR CULT: ABNORMAL

## 2023-02-12 PROCEDURE — 97530 THERAPEUTIC ACTIVITIES: CPT | Mod: GP

## 2023-02-12 PROCEDURE — 120N000001 HC R&B MED SURG/OB

## 2023-02-12 PROCEDURE — 250N000011 HC RX IP 250 OP 636: Performed by: HOSPITALIST

## 2023-02-12 PROCEDURE — 250N000013 HC RX MED GY IP 250 OP 250 PS 637: Performed by: STUDENT IN AN ORGANIZED HEALTH CARE EDUCATION/TRAINING PROGRAM

## 2023-02-12 PROCEDURE — 36415 COLL VENOUS BLD VENIPUNCTURE: CPT | Performed by: INTERNAL MEDICINE

## 2023-02-12 PROCEDURE — 87040 BLOOD CULTURE FOR BACTERIA: CPT | Performed by: INTERNAL MEDICINE

## 2023-02-12 PROCEDURE — 250N000013 HC RX MED GY IP 250 OP 250 PS 637: Performed by: INTERNAL MEDICINE

## 2023-02-12 PROCEDURE — 99222 1ST HOSP IP/OBS MODERATE 55: CPT | Performed by: INTERNAL MEDICINE

## 2023-02-12 PROCEDURE — 97116 GAIT TRAINING THERAPY: CPT | Mod: GP

## 2023-02-12 PROCEDURE — 99232 SBSQ HOSP IP/OBS MODERATE 35: CPT | Performed by: INTERNAL MEDICINE

## 2023-02-12 PROCEDURE — 97161 PT EVAL LOW COMPLEX 20 MIN: CPT | Mod: GP

## 2023-02-12 PROCEDURE — 97535 SELF CARE MNGMENT TRAINING: CPT | Mod: GO

## 2023-02-12 RX ORDER — BISACODYL 10 MG
10 SUPPOSITORY, RECTAL RECTAL DAILY PRN
Status: DISCONTINUED | OUTPATIENT
Start: 2023-02-12 | End: 2023-02-20 | Stop reason: HOSPADM

## 2023-02-12 RX ADMIN — SENNOSIDES AND DOCUSATE SODIUM 2 TABLET: 50; 8.6 TABLET ORAL at 09:11

## 2023-02-12 RX ADMIN — ATORVASTATIN CALCIUM 20 MG: 20 TABLET, FILM COATED ORAL at 09:11

## 2023-02-12 RX ADMIN — LISINOPRIL 20 MG: 20 TABLET ORAL at 09:11

## 2023-02-12 RX ADMIN — MEROPENEM 1 G: 1 INJECTION, POWDER, FOR SOLUTION INTRAVENOUS at 14:06

## 2023-02-12 RX ADMIN — POLYETHYLENE GLYCOL 3350 17 G: 17 POWDER, FOR SOLUTION ORAL at 09:12

## 2023-02-12 RX ADMIN — MEROPENEM 1 G: 1 INJECTION, POWDER, FOR SOLUTION INTRAVENOUS at 21:55

## 2023-02-12 RX ADMIN — MEROPENEM 1 G: 1 INJECTION, POWDER, FOR SOLUTION INTRAVENOUS at 06:22

## 2023-02-12 ASSESSMENT — ACTIVITIES OF DAILY LIVING (ADL)
ADLS_ACUITY_SCORE: 49

## 2023-02-12 NOTE — PLAN OF CARE
Pt here with UTI, septic encephalopathy. Disoriented to situation, time. Neuros intact, BUE 5/5, BLE 4/5, minimal left droop, generalized weakness. VSS. Tele NSR. IDDSI level regular diet with thin liquids. Takes pills crushed in applesauce. Up with Ax1-2, gait belt, walker. Incontinent of bladder, no BM since admission, prn laxatives administered. Appears uncomfortable with repositioning at times. Pt scoring green on the Aggression Stop Light Tool. Plan for discharge pending.

## 2023-02-12 NOTE — PLAN OF CARE
Pt here with Seizure. A&O to self and place, confused and forgetful. Neuros L droop, generalized weakness. VSS. Tele NSR. Regular diet, thin liquids. Takes pills crushed in applesauce. Up with A2/lift. Incontinent B&B. Denies pain. Pt scoring green on the Aggression Stop Light Tool. Discharge pending.

## 2023-02-12 NOTE — PROGRESS NOTES
[facility]    Medicine Progress Note - Hospitalist Service    Date of Admission:  2/9/2023    Assessment & Plan   Sam Barry is a 77 year old male admitted on 2/9/2023.   Per report patient had been acting confused, witnessed by wife who started to collapse while he was standing and wife had assisted him to the chair, he became less responsive and see observed some shaking like movements in his extremities and so EMS was called     ESBL E coli UTI   Septic encephalopathy  -Prior to admission he had been acting confused per wife, speaking gibberish and became less responsive with subsequent shaking-like movement in his extremities  -* lactic acidosis was elevated on admission  * CT/CTA head negative for acute findings   * hyper acute MRI negative for acute findings   -Initially started on Keppra  -UA abnormal, initially treated with ceftriaxone  -General neurology evaluated the patient, EEG did not suggest seizure activity  -Neurology convinced that UTI contributing to his mental status changes and likely does not have seizure  -Urine culture growing ESBL E. coli, antibiotic changed to meropenem last night  -Infectious disease consult  -PT/OT consulted for safe disposition recommendation, currently recommending TCU  -Fall precautions     HTN  Hyperlipidemia  -Continue PTA lisinopril with holding parameters and PTA atorvastatin     Osteopenia  - hold alendronate while in the hospital     Mild cognitive impairment  Reported parkinsonian features  * wife reports shuffling gait, tremor, confusion and possible cognitive impairment over the last 6 to 12 months.    -Neurology recommends plan for follow-up in 4 to 6 weeks after recovery from current event    Diet: Regular Diet Adult  Room Service    DVT Prophylaxis: Pneumatic Compression Devices  Cardenas Catheter: Not present  Lines: None     Cardiac Monitoring: None  Code Status: Full Code      Clinically Significant Risk Factors               "           # Overweight: Estimated body mass index is 26.81 kg/m  as calculated from the following:    Height as of this encounter: 1.753 m (5' 9.02\").    Weight as of this encounter: 82.4 kg (181 lb 10.5 oz)., PRESENT ON ADMISSION         Disposition Plan      Expected Discharge Date: 02/13/2023        Discharge Comments: PT/OT to see          Jocelyne Fry MD  Hospitalist Service  [facility]  Securely message with [employer] (more info)  Text page via [employer] Paging/Directory   ______________________________________________________________________    Interval History   Patient seems to be more coherent today, less confused compared to yesterday however still not completely with it.  Tried to call the wife to update, unable to reach, left a voice message    Physical Exam   Vital Signs: Temp: 97.6  F (36.4  C) Temp src: Oral BP: 126/80 Pulse: 82   Resp: 16 SpO2: 96 % O2 Device: None (Room air)    Weight: 181 lbs 10.54 oz    Exam:  Constitutional: Awake, alert but appears confused, unknown baseline. Appears comfortable  Head: Normocephalic. No masses, lesions, tenderness or abnormalities  ENT: ENT exam normal, no neck nodes or sinus tenderness  Cardiovascular: RRR.  No murmurs, no rubs or JVD  Respiratory: Normal WOB,b/l equal air entry, no wheezes or crackles   Gastrointestinal: Abdomen soft, non-tender. BS normal. No masses, organomegaly  : Deferred  Extremities : No edema , no clubbing or cyanosis    Neurologic: Cranial nerves II-XII grossly intact , power normal, Reflexes normal and symmetric. Sensation grossly WNL.      Medical Decision Making     40 MINUTES SPENT BY ME on the date of service doing chart review, history, exam, documentation & further activities per the note.      Data         Imaging results reviewed over the past 24 hrs:   No results found for this or any previous visit (from the past 24 hour(s)).  Recent Labs   Lab 02/10/23  0803 02/09/23  1428   WBC 9.8 11.0   HGB 13.4 " 13.8   MCV 90 90    277   INR  --  1.17*    138   POTASSIUM 3.5 3.8   CHLORIDE 101 98   CO2 26 20*   BUN 8.6 10.1   CR 0.99 1.09   ANIONGAP 9 20*   KATIE 9.4 9.9   * 159*

## 2023-02-12 NOTE — PROGRESS NOTES
"   02/12/23 0826   Appointment Info   Signing Clinician's Name / Credentials (PT) Kade Villareal DPT   Living Environment   People in Home parent(s);sibling(s);spouse   Current Living Arrangements house   Home Accessibility other (see comments)   Transportation Anticipated family or friend will provide   Living Environment Comments Pt initially stating that he lives with his  mother and brother. 2 FRANK home, per OT pt has ramp for this. Per OT note \"Spouse present to provide PLOF as pt is questionable historian. BR SET UP: Walk in shower, shower chair but pt prefers to stand, plans to install grab bars; has RTS with toilet arms - pt does no like to use. All needs met on one level. Pt has been using FWW and has quad cane. Pt's spouse home 24/7 to assist with I/ADLs as needed - she is not working right now\"   Self-Care   Usual Activity Tolerance moderate   Current Activity Tolerance fair   Regular Exercise No   Equipment Currently Used at Home walker, standard;cane, quad;shower chair;raised toilet seat   Fall history within last six months no   Activity/Exercise/Self-Care Comment Pt reports being IND with dressing, pt unsure about bathing. Pt reports that he \"occasionally\" uses walker and/or cane with mobility, but trys to go without. Per OT note (information from spouse present yesterday), pt has assitance with dressing, bathing, and ambulates with walker. IND with toileting.   General Information   Onset of Illness/Injury or Date of Surgery 02/09/23   Referring Physician Jocelyne Fry MD   Patient/Family Therapy Goals Statement (PT) did not state.   Pertinent History of Current Problem (include personal factors and/or comorbidities that impact the POC) Pt is 77 male who, per chart, \" Per report patient had been acting confused, witnessed by wife who started to collapse while he was standing and wife had assisted him to the chair, he became less responsive and see observed some shaking like movements in his " "extremities and so EMS was called\" probably UTI with Septic Encephalopathy.   Existing Precautions/Restrictions fall   Cognition   Affect/Mental Status (Cognition) confused   Orientation Status (Cognition) oriented to;person   Cognitive Status Comments Pt not accurate historian. Pt aware it is Feb.   Pain Assessment   Patient Currently in Pain   (0/10 at rest, increased pain in LB with mobility per pt report.)   Integumentary/Edema   Integumentary/Edema no deficits were identifed   Posture    Posture Kyphosis;Protracted shoulders   Range of Motion (ROM)   Range of Motion ROM is WFL   Strength (Manual Muscle Testing)   Strength (Manual Muscle Testing) Deficits observed during functional mobility   Bed Mobility   Bed Mobility supine-sit   Comment, (Bed Mobility) supine-sit with modA, increased pain in thoracic spine during mobility.   Transfers   Transfers sit-stand transfer   Comment, (Transfers) STS with FWW and Maru, increased time required due to increased pain in back with movement.   Gait/Stairs (Locomotion)   Comment, (Gait/Stairs) Pt ambulated 10' with FWW and CGA, very slow gait speed, shuffling gait pattern, no overt LOB during gait.   Balance   Balance Comments requiring FWW for standing and ambulation, no overt LOB.   Sensory Examination   Sensory Perception patient reports no sensory changes   Sensory Perception Comments light touch appears intact per screen in distal UEs and LEs   Clinical Impression   Criteria for Skilled Therapeutic Intervention Yes, treatment indicated   PT Diagnosis (PT) impaired functional mobility   Influenced by the following impairments pain, decreased strength and activity tolerance, confusion   Functional limitations due to impairments difficulty with bed mobility, transfers and ambulation   Clinical Presentation (PT Evaluation Complexity) Stable/Uncomplicated   Clinical Presentation Rationale clinical judgement   Clinical Decision Making (Complexity) low complexity   Planned " Therapy Interventions (PT) balance training;bed mobility training;gait training;neuromuscular re-education;ROM (range of motion);stair training;strengthening;transfer training;progressive activity/exercise   Risk & Benefits of therapy have been explained evaluation/treatment results reviewed;care plan/treatment goals reviewed;risks/benefits reviewed;current/potential barriers reviewed;participants voiced agreement with care plan;participants included;patient   PT Total Evaluation Time   PT Eval, Low Complexity Minutes (69582) 10   Physical Therapy Goals   PT Frequency 5x/week   PT Predicted Duration/Target Date for Goal Attainment 02/19/23   PT Goals Bed Mobility;Transfers;Gait   PT: Bed Mobility Supervision/stand-by assist;Supine to/from sit   PT: Transfers Supervision/stand-by assist;Sit to/from stand;Assistive device   PT: Gait Supervision/stand-by assist;Rolling walker;50 feet   PT Discharge Planning   PT Plan progress bed mob, increase gait distance - visual/auditory cues for increased edgardo and larger steps. Repeat STS.   PT Discharge Recommendation (DC Rec) Transitional Care Facility   PT Rationale for DC Rec Unclear pt's mobility at [facility] due to increased confusion, per chart, appears that pt uses walker for ambulation and has assistance from spouse with ADLs and IADLs. Pt below baseline, increased difficulty with bed mobility and trasnfers on this date, requiring modA. Recommending TCF to progress strength, and improve IND with functional mobility prior to d/c home. If pt has up to min-modA with bed mob and trasnfers at home, w/c for longer distances, pt may be able to return home.   PT Brief overview of current status bed mob modA, STS with FWW and min-modA, gait with FWW and CGA.   Total Session Time   Total Session Time (sum of timed and untimed services) 10

## 2023-02-12 NOTE — PROVIDER NOTIFICATION
"Text sent to hospitalist \"can you please order additional laxative medication? no bm noted since admission on 2/9.\"    "

## 2023-02-12 NOTE — CONSULTS
[facility]    Infectious Disease Consultation     Date of Admission:  2/9/2023  Date of Consult (When I saw the patient): 02/12/23    Assessment & Plan   Sam Barry is a 77 year old male who was admitted on 2/9/2023.     Impression:  1. 77 y.o with admitted confused, witnessed by wife who started to collapse while he was standing and wife had assisted him to the chair, he became less responsive and see observed some shaking like movements in his extremities and so EMS was called  2. CT/CTA head negative for acute findings   3. MRI negative for acute findings   4. UA was abnormal urine culture growing E. coli ESBL   5. HTN, Hyperlipidemia  6. Mild cognitive impairment  7. Reported parkinsonian features  * wife reports shuffling gait, tremor, confusion and possible cognitive impairment over the last 6 to 12 months.    8. Currently on meropenem     Recommendations;   Get a set of blood cultures   Continue on meropenem for now      Omar Rodriguez MD    Reason for Consult   Reason for consult: I was asked to evaluate this patient for ESBL uti     Primary Care Physician   [facility]    Chief Complaint   Confusion   Still disoriented     History is obtained from the patient and medical records    History of Present Illness   Sam Barry is a 77 year old male who presented with confusion and shakiness   Still disoriented   Found to have ESBL org UTI   On a carbapenem now.     Past Medical History   I have reviewed this patient's medical history and updated it with pertinent information if needed.   Past Medical History:   Diagnosis Date    Arthritis     lower leg    BPH (benign prostatic hyperplasia)     Hyperlipidemia     IFG (impaired fasting glucose)     MCI (mild cognitive impairment)     with memory loss    Osteopenia     Tachycardia     Vitamin D deficiency        Past Surgical History   I have reviewed this patient's surgical history and updated it with pertinent  information if needed.  Past Surgical History:   Procedure Laterality Date    APPENDECTOMY      CYSTOSCOPY, TRANSURETHRAL RESECTION (TUR) PROSTATE, COMBINED N/A 9/18/2018    Procedure: COMBINED CYSTOSCOPY, TRANSURETHRAL RESECTION (TUR) PROSTATE;  CYSTOSCOPY, TRANSURETHRAL RESECTION OF THE PROSTATE.    EBL: 30mL;  Surgeon: Fredy Feliz MD;  Location:  OR       Prior to Admission Medications   Prior to Admission Medications   Prescriptions Last Dose Informant Patient Reported? Taking?   Multiple Vitamin (MULTI-VITAMINS) TABS  Self Yes No   Sig: Take 1 tablet by mouth daily    VITAMIN D, CHOLECALCIFEROL, PO  Self Yes No   Sig: Take 1 tablet by mouth daily   alendronate (FOSAMAX) 70 MG tablet   Yes Yes   Sig: Take 70 mg by mouth every 7 days   atorvastatin (LIPITOR) 20 MG tablet   Yes Yes   Sig: Take 20 mg by mouth daily With evening meal   lisinopril (ZESTRIL) 20 MG tablet   Yes Yes   Sig: Take 20 mg by mouth daily      Facility-Administered Medications: None     Allergies   No Known Allergies    Immunization History   Immunization History   Administered Date(s) Administered    COVID-19 Vaccine ([employer]) 05/24/2021    COVID-19 Vaccine 12+ (Pfizer 2022) 06/09/2022    COVID-19 Vaccine 12+ (Pfizer) 11/29/2021       Social History   I have reviewed this patient's social history and updated it with pertinent information if needed. Sam Barry  reports that he has never smoked. He has never used smokeless tobacco. He reports that he does not drink alcohol and does not use drugs.    Family History   I have reviewed this patient's family history and updated it with pertinent information if needed.   No family history on file.    Review of Systems   The 10 point Review of Systems is negative     Physical Exam   Temp: 97.6  F (36.4  C) Temp src: Oral BP: 126/80 Pulse: 82   Resp: 16 SpO2: 96 % O2 Device: None (Room air)    Vital Signs with Ranges  Temp:  [97.6  F (36.4  C)-99  F (37.2  C)] 97.6  F (36.4   C)  Pulse:  [82-98] 82  Resp:  [16] 16  BP: (100-128)/(78-88) 126/80  SpO2:  [94 %-96 %] 96 %  181 lbs 10.54 oz  Body mass index is 26.81 kg/m .    GENERAL APPEARANCE:  awake  EYES: Eyes grossly normal to inspection  NECK: no adenopathy  RESP: lungs clear   CV: regular rates and rhythm  LYMPHATICS: normal ant/post cervical and supraclavicular nodes  ABDOMEN: soft, nontender  MS: extremities normal  SKIN: no suspicious lesions or rashes        Data   Lab Results   Component Value Date    WBC 9.8 02/10/2023    HGB 13.4 02/10/2023    HCT 40.0 02/10/2023     02/10/2023     02/10/2023    POTASSIUM 3.5 02/10/2023    CHLORIDE 101 02/10/2023    CO2 26 02/10/2023    BUN 8.6 02/10/2023    CR 0.99 02/10/2023     (H) 02/10/2023    INR 1.17 (H) 02/09/2023     No results for input(s): CULT in the last 168 hours.  Recent Labs   Lab Test 07/11/18  1005 12/01/14  1837   CULT >100,000 colonies/mL  Klebsiella pneumoniae  * >100,000 colonies/mL Coagulase negative Staphylococcus*          All cultures:  Recent Labs   Lab 02/10/23  0207   CULTURE >100,000 CFU/mL Escherichia coli ESBL*      Blood culture:  No results found for this or any previous visit.   Urine culture:  Results for orders placed or performed during the hospital encounter of 02/09/23   Urine Culture    Specimen: Urine, Clean Catch   Result Value Ref Range    Culture >100,000 CFU/mL Escherichia coli ESBL (A)        Susceptibility    Escherichia coli ESBL - MARIAA*     Ampicillin >=32 Resistant ug/mL     Piperacillin/Tazobactam 8 Susceptible ug/mL     Cefazolin* >=64 Resistant ug/mL      * Cefazolin MARIAA breakpoints are for the treatment of uncomplicated urinary tract infections. For the treatment of systemic infections, please contact the laboratory for additional testing.     Cefoxitin 8 Susceptible ug/mL     Ceftazidime  Resistant      Ceftriaxone  Resistant      Cefepime  Resistant      Meropenem* <=0.25 Susceptible ug/mL      * Enterobacterales that  are susceptible to meropenem are usually susceptible to ertapenem.     Gentamicin >=16 Resistant ug/mL     Tobramycin 8 Intermediate ug/mL     Ciprofloxacin >=4 Resistant ug/mL     Levofloxacin >=8 Resistant ug/mL     Nitrofurantoin <=16 Susceptible ug/mL     Trimethoprim/Sulfamethoxazole <=1/19 Susceptible ug/mL     * ESBL (extended spectrum beta lactamase) producing organisms require contact precautions.   Results for orders placed or performed during the hospital encounter of 07/11/18   Urine Culture    Specimen: Urine catheter; Catheterized Urine   Result Value Ref Range    Specimen Description Catheterized Urine     Special Requests Specimen received in preservative     Culture Micro >100,000 colonies/mL  Klebsiella pneumoniae   (A)        Susceptibility    Klebsiella pneumoniae - MARIAA     Ampicillin*  Resistant ug/mL      * Intrinsically Resistant     Cefazolin* <=4 Susceptible ug/mL      * Cefazolin MARIAA breakpoints are for the treatment of uncomplicated urinary tract infections.  For the treatment of systemic infections, please contact the laboratory for additional testing.     Cefoxitin <=4 Susceptible ug/mL     Ceftazidime <=1 Susceptible ug/mL     Ceftriaxone <=1 Susceptible ug/mL     Ciprofloxacin <=0.25 Susceptible ug/mL     Gentamicin <=1 Susceptible ug/mL     Levofloxacin <=0.12 Susceptible ug/mL     Nitrofurantoin 64 Intermediate ug/mL     Tobramycin <=1 Susceptible ug/mL     Trimethoprim/Sulfamethoxazole <=1/19 Susceptible ug/mL     Ampicillin/Sulbactam 4 Susceptible ug/mL     Piperacillin/Tazo <=4 Susceptible ug/mL     Cefepime <=1 Susceptible ug/mL   Results for orders placed or performed during the hospital encounter of 12/01/14   Urine culture    Specimen: Urine catheter   Result Value Ref Range    Specimen Description Catheterized Urine     Special Requests Specimen received in preservative     Culture Micro (A)      >100,000 colonies/mL Coagulase negative Staphylococcus    Micro Report Status  FINAL 12/02/2014     Organism:       >100,000 colonies/mL Coagulase negative Staphylococcus       Susceptibility    >100,000 colonies/ml coagulase negative staphylococcus (ant) -  (no method available)     Ciprofloxacin <=0.5 Susceptible  ug/mL     Gentamicin <=0.5 Susceptible  ug/mL     Levofloxacin 0.25 Susceptible  ug/mL     Nitrofurantoin <=16 Susceptible  ug/mL     Oxacillin <=0.25 Susceptible  ug/mL     Penicillin >2.0 Resistant  ug/mL     Tetracycline <=1 Susceptible  ug/mL     Vancomycin 1 Susceptible  ug/mL

## 2023-02-13 ENCOUNTER — APPOINTMENT (OUTPATIENT)
Dept: PHYSICAL THERAPY | Facility: CLINIC | Age: 78
DRG: 689 | End: 2023-02-13
Attending: INTERNAL MEDICINE
Payer: COMMERCIAL

## 2023-02-13 ENCOUNTER — APPOINTMENT (OUTPATIENT)
Dept: OCCUPATIONAL THERAPY | Facility: CLINIC | Age: 78
DRG: 689 | End: 2023-02-13
Attending: INTERNAL MEDICINE
Payer: COMMERCIAL

## 2023-02-13 LAB
GLUCOSE BLDC GLUCOMTR-MCNC: 100 MG/DL (ref 70–99)
GLUCOSE BLDC GLUCOMTR-MCNC: 97 MG/DL (ref 70–99)

## 2023-02-13 PROCEDURE — 97535 SELF CARE MNGMENT TRAINING: CPT | Mod: GO

## 2023-02-13 PROCEDURE — 250N000013 HC RX MED GY IP 250 OP 250 PS 637: Performed by: STUDENT IN AN ORGANIZED HEALTH CARE EDUCATION/TRAINING PROGRAM

## 2023-02-13 PROCEDURE — 250N000011 HC RX IP 250 OP 636: Performed by: HOSPITALIST

## 2023-02-13 PROCEDURE — 120N000001 HC R&B MED SURG/OB

## 2023-02-13 PROCEDURE — 99232 SBSQ HOSP IP/OBS MODERATE 35: CPT | Performed by: INTERNAL MEDICINE

## 2023-02-13 PROCEDURE — 97530 THERAPEUTIC ACTIVITIES: CPT | Mod: GP

## 2023-02-13 PROCEDURE — 250N000013 HC RX MED GY IP 250 OP 250 PS 637: Performed by: INTERNAL MEDICINE

## 2023-02-13 PROCEDURE — 97116 GAIT TRAINING THERAPY: CPT | Mod: GP

## 2023-02-13 RX ADMIN — MEROPENEM 1 G: 1 INJECTION, POWDER, FOR SOLUTION INTRAVENOUS at 21:35

## 2023-02-13 RX ADMIN — LISINOPRIL 20 MG: 20 TABLET ORAL at 09:27

## 2023-02-13 RX ADMIN — MEROPENEM 1 G: 1 INJECTION, POWDER, FOR SOLUTION INTRAVENOUS at 14:05

## 2023-02-13 RX ADMIN — ACETAMINOPHEN 650 MG: 325 TABLET, FILM COATED ORAL at 17:11

## 2023-02-13 RX ADMIN — MEROPENEM 1 G: 1 INJECTION, POWDER, FOR SOLUTION INTRAVENOUS at 06:52

## 2023-02-13 RX ADMIN — ATORVASTATIN CALCIUM 20 MG: 20 TABLET, FILM COATED ORAL at 09:27

## 2023-02-13 ASSESSMENT — ACTIVITIES OF DAILY LIVING (ADL)
ADLS_ACUITY_SCORE: 49
ADLS_ACUITY_SCORE: 50
ADLS_ACUITY_SCORE: 50
ADLS_ACUITY_SCORE: 49
ADLS_ACUITY_SCORE: 50
ADLS_ACUITY_SCORE: 49
ADLS_ACUITY_SCORE: 50
ADLS_ACUITY_SCORE: 49
ADLS_ACUITY_SCORE: 49

## 2023-02-13 NOTE — PROGRESS NOTES
02/13/2023 [id number]  Pt admitted on 2/9 for increased confusion and new onset of seizures found to have a UTI. Contact precautions. VSS on RA. Still confused. Pt knew his name and was off by 2 days for his birthday. Also disoriented to place. Knew month/year. neuros intact. Incontinent B&B. Ax1-2 GB/W. PIV SL with intermittent ABX. Tolerating regular diet. Takes pills crushed in applesauce. Discharge pending-  consulted

## 2023-02-13 NOTE — PROGRESS NOTES
[facility]    Medicine Progress Note - Hospitalist Service    Date of Admission:  2/9/2023    Assessment & Plan   Sam Barry is a 77 year old male admitted on 2/9/2023.   Per report patient had been acting confused, witnessed by wife who started to collapse while he was standing and wife had assisted him to the chair, he became less responsive and see observed some shaking like movements in his extremities and so EMS was called     ESBL E coli UTI   Septic encephalopathy  -Prior to admission he had been acting confused per wife, speaking gibberish and became less responsive with subsequent shaking-like movement in his extremities  -* lactic acidosis was elevated on admission  * CT/CTA head negative for acute findings   * hyper acute MRI negative for acute findings   -Initially started on Keppra  -UA abnormal, initially treated with ceftriaxone  -General neurology evaluated the patient, EEG did not suggest seizure activity  -Neurology convinced that UTI contributing to his mental status changes and likely does not have seizure  -Urine culture growing ESBL E. coli, antibiotic changed to meropenem   -Infectious disease following, appreciate input  -Blood culture negative to date  -PT/OT consulted for safe disposition recommendation, currently recommending TCU  -Fall precautions  - following for discharge planning     HTN  Hyperlipidemia  -Continue PTA lisinopril with holding parameters and PTA atorvastatin     Osteopenia  - hold alendronate while in the hospital     Mild cognitive impairment  Reported parkinsonian features  * wife reports shuffling gait, tremor, confusion and possible cognitive impairment over the last 6 to 12 months.    -Neurology recommends plan for follow-up in 4 to 6 weeks after recovery from current event    Diet: Regular Diet Adult  Room Service    DVT Prophylaxis: Pneumatic Compression Devices  Cardenas Catheter: Not present  Lines: None     Cardiac  "Monitoring: None  Code Status: Full Code      Clinically Significant Risk Factors                         # Overweight: Estimated body mass index is 26.81 kg/m  as calculated from the following:    Height as of this encounter: 1.753 m (5' 9.02\").    Weight as of this encounter: 82.4 kg (181 lb 10.5 oz)., PRESENT ON ADMISSION         Disposition Plan      Expected Discharge Date: 02/15/2023        Discharge Comments: PT/OT to see          Jocelyne Fry MD  Hospitalist Service  [facility]  Securely message with [employer] (more info)  Text page via [facility] Paging/Directory   ______________________________________________________________________    Interval History   Patient still somewhat confused.  He did recognize me and he knew he was in the hospital, he was aware it was February but could not remember the year, once prompted with multiple choices he did say it was 2023.  Tried calling wife, left a voice message    Physical Exam   Vital Signs: Temp: 98  F (36.7  C) Temp src: Oral BP: 133/78 Pulse: 98   Resp: 16 SpO2: 94 % O2 Device: None (Room air)    Weight: 181 lbs 10.54 oz    Exam:  Constitutional: Awake, alert, still appears confused. Appears comfortable  Head: Normocephalic. No masses, lesions, tenderness or abnormalities  ENT: ENT exam normal, no neck nodes or sinus tenderness  Cardiovascular: RRR.  No murmurs, no rubs or JVD  Respiratory: Normal WOB,b/l equal air entry, no wheezes or crackles   Gastrointestinal: Abdomen soft, non-tender. BS normal. No masses, organomegaly  : Deferred  Extremities : No edema , no clubbing or cyanosis    Neurologic: Cranial nerves II-XII grossly intact , power normal, Reflexes normal and symmetric. Sensation grossly WNL.      Medical Decision Making     38 MINUTES SPENT BY ME on the date of service doing chart review, history, exam, documentation & further activities per the note.      Data         Imaging results reviewed over the past 24 hrs:   No " results found for this or any previous visit (from the past 24 hour(s)).  Recent Labs   Lab 02/10/23  0803 02/09/23  1428   WBC 9.8 11.0   HGB 13.4 13.8   MCV 90 90    277   INR  --  1.17*    138   POTASSIUM 3.5 3.8   CHLORIDE 101 98   CO2 26 20*   BUN 8.6 10.1   CR 0.99 1.09   ANIONGAP 9 20*   KATIE 9.4 9.9   * 159*

## 2023-02-13 NOTE — PLAN OF CARE
Pt here with Seizure, UTI, Septic encephalopathy. A&O to self and place, confused and forgetful. Neuros slight L droop, generalized weakness, BLE 4/5. VSS. Regular diet, thin liquids. Takes pills crushed in applesauce. Up with A1-2/GBW. Incontinent B&B. Denies pain. Pt scoring green on the Aggression Stop Light Tool. Discharge pending.

## 2023-02-14 ENCOUNTER — APPOINTMENT (OUTPATIENT)
Dept: OCCUPATIONAL THERAPY | Facility: CLINIC | Age: 78
DRG: 689 | End: 2023-02-14
Attending: INTERNAL MEDICINE
Payer: COMMERCIAL

## 2023-02-14 ENCOUNTER — APPOINTMENT (OUTPATIENT)
Dept: PHYSICAL THERAPY | Facility: CLINIC | Age: 78
DRG: 689 | End: 2023-02-14
Attending: INTERNAL MEDICINE
Payer: COMMERCIAL

## 2023-02-14 PROCEDURE — 120N000001 HC R&B MED SURG/OB

## 2023-02-14 PROCEDURE — 250N000011 HC RX IP 250 OP 636: Performed by: HOSPITALIST

## 2023-02-14 PROCEDURE — 97535 SELF CARE MNGMENT TRAINING: CPT | Mod: GO

## 2023-02-14 PROCEDURE — 97530 THERAPEUTIC ACTIVITIES: CPT | Mod: GP | Performed by: PHYSICAL THERAPIST

## 2023-02-14 PROCEDURE — 99232 SBSQ HOSP IP/OBS MODERATE 35: CPT | Performed by: INTERNAL MEDICINE

## 2023-02-14 PROCEDURE — 97116 GAIT TRAINING THERAPY: CPT | Mod: GP | Performed by: PHYSICAL THERAPIST

## 2023-02-14 PROCEDURE — 250N000013 HC RX MED GY IP 250 OP 250 PS 637: Performed by: INTERNAL MEDICINE

## 2023-02-14 RX ADMIN — LISINOPRIL 20 MG: 20 TABLET ORAL at 08:45

## 2023-02-14 RX ADMIN — MEROPENEM 1 G: 1 INJECTION, POWDER, FOR SOLUTION INTRAVENOUS at 23:14

## 2023-02-14 RX ADMIN — MEROPENEM 1 G: 1 INJECTION, POWDER, FOR SOLUTION INTRAVENOUS at 13:36

## 2023-02-14 RX ADMIN — ATORVASTATIN CALCIUM 20 MG: 20 TABLET, FILM COATED ORAL at 08:45

## 2023-02-14 RX ADMIN — MEROPENEM 1 G: 1 INJECTION, POWDER, FOR SOLUTION INTRAVENOUS at 05:37

## 2023-02-14 ASSESSMENT — ACTIVITIES OF DAILY LIVING (ADL)
ADLS_ACUITY_SCORE: 45
ADLS_ACUITY_SCORE: 49
ADLS_ACUITY_SCORE: 45
ADLS_ACUITY_SCORE: 49
ADLS_ACUITY_SCORE: 45

## 2023-02-14 NOTE — PLAN OF CARE
Pt here with confusion and UTI. A&Ox3, disoriented to situation. Neuros stable with generalized weakness. SBP<180. Regular diet, thin liquids. Takes pills whole. Up with A1 GBW. Denies pain. Pt scoring green on the Aggression Stop Light Tool. Plan TCU. Discharge pending.

## 2023-02-14 NOTE — PLAN OF CARE
Reason for Admission: Confusion, UTI    Cognitive/Mentation: Alert/confused, disoriented to situation, occasionally disoriented to place  Neuro/CMS: intact except generalized weakness, at times inconsistent with following commands  VS: Stable, SBP goal <180  GI: BS hypoactive, no BM this shift  : Incontinent, external catheter placed  Pulmonary: LS clear, RA  Pain: denies    Drains/Lines: PIV SL, intermittent IV abx  Skin: intact  Activity: A1, GB/W  Diet: Regular, takes pills crushed in applesauce    Therapy recs: TCU  Discharge: pending    Aggression Stoplight Tool: green

## 2023-02-14 NOTE — PLAN OF CARE
Neuro: Alert. Oriented. Needs reminders of place. Forgetful at times  Cardio: BP WDL   Resp: LS clear RA  GI: Poor appetite. 25% of meal. BS hypoactive  : incontinent  Pain: Denies  Activity: A1 GBW  Skin: Intact

## 2023-02-14 NOTE — PROGRESS NOTES
[facility]    Infectious Disease Progress Note    Date of Service (when I saw the patient): 02/14/2023     Assessment & Plan   Sam Barry is a 77 year old male who was admitted on 2/9/2023.   Impression:  1. 77 y.o with admitted confused, witnessed by wife who started to collapse while he was standing and wife had assisted him to the chair, he became less responsive and see observed some shaking like movements in his extremities and so EMS was called  2. CT/CTA head negative for acute findings   3. MRI negative for acute findings   4. UA was abnormal urine culture growing E. coli ESBL   5. HTN, Hyperlipidemia  6. Mild cognitive impairment  7. Reported parkinsonian features  * wife reports shuffling gait, tremor, confusion and possible cognitive impairment over the last 6 to 12 months.    8. Currently on meropenem      Recommendations;     Continue on meropenem while admitted when ready for discharge can be switched to PO bactrim to finish a 10 days course.     Will follow peripherally call if ques            Omar Rodriguez MD    Interval History   Tolerating antibiotics ok   No new rashes or issues with antibiotics   Labs reviewed   No changes to past medical, social or family history   afebril e  No new complaints   A 10 point ROS was done and is negative other than noted in the interval history above     Physical Exam   Temp: 98  F (36.7  C) Temp src: Oral BP: (!) 149/98 Pulse: 79   Resp: 16 SpO2: 96 % O2 Device: None (Room air)    Vitals:    02/09/23 1429   Weight: 82.4 kg (181 lb 10.5 oz)     Vital Signs with Ranges  Temp:  [98  F (36.7  C)-98.4  F (36.9  C)] 98  F (36.7  C)  Pulse:  [79-99] 79  Resp:  [16] 16  BP: (114-154)/(79-99) 149/98  SpO2:  [95 %-98 %] 96 %    Constitutional: Awake, alert, cooperative, no apparent distress  Lungs: Clear to auscultation bilaterally, no crackles or wheezing  Cardiovascular: Regular rate and rhythm, normal S1 and S2, and no murmur  noted  Abdomen: Normal bowel sounds, soft, non-distended, non-tender  Skin: No rashes, no cyanosis, no edema  Other:    Medications       atorvastatin  20 mg Oral Daily     lisinopril  20 mg Oral Daily     meropenem  1 g Intravenous Q8H     sodium chloride (PF)  3 mL Intracatheter Q8H       Data   All microbiology laboratory data reviewed.  Recent Labs   Lab Test 02/10/23  0803 02/09/23  1428 09/21/20  2053   WBC 9.8 11.0 10.5   HGB 13.4 13.8 14.7   HCT 40.0 41.3 42.0   MCV 90 90 89    277 255     Recent Labs   Lab Test 02/10/23  0803 02/09/23  1428 09/21/20  2053   CR 0.99 1.09 1.09     No lab results found.  Recent Labs   Lab Test 07/11/18  1005 12/01/14  1837   CULT >100,000 colonies/mL  Klebsiella pneumoniae  * >100,000 colonies/mL Coagulase negative Staphylococcus*       All cultures:  Recent Labs   Lab 02/12/23  1519 02/12/23  1511 02/10/23  0207   CULTURE No growth after 1 day No growth after 1 day >100,000 CFU/mL Escherichia coli ESBL*      Blood culture:  Results for orders placed or performed during the hospital encounter of 02/09/23   Blood Culture Hand, Right    Specimen: Hand, Right; Blood   Result Value Ref Range    Culture No growth after 1 day    Blood Culture Hand, Left    Specimen: Hand, Left; Blood   Result Value Ref Range    Culture No growth after 1 day       Urine culture:  Results for orders placed or performed during the hospital encounter of 02/09/23   Urine Culture    Specimen: Urine, Clean Catch   Result Value Ref Range    Culture >100,000 CFU/mL Escherichia coli ESBL (A)        Susceptibility    Escherichia coli ESBL - MARIAA*     Ampicillin >=32 Resistant ug/mL     Piperacillin/Tazobactam 8 Susceptible ug/mL     Cefazolin* >=64 Resistant ug/mL      * Cefazolin MARIAA breakpoints are for the treatment of uncomplicated urinary tract infections. For the treatment of systemic infections, please contact the laboratory for additional testing.     Cefoxitin 8 Susceptible ug/mL     Ceftazidime   Resistant      Ceftriaxone  Resistant      Cefepime  Resistant      Meropenem* <=0.25 Susceptible ug/mL      * Enterobacterales that are susceptible to meropenem are usually susceptible to ertapenem.     Gentamicin >=16 Resistant ug/mL     Tobramycin 8 Intermediate ug/mL     Ciprofloxacin >=4 Resistant ug/mL     Levofloxacin >=8 Resistant ug/mL     Nitrofurantoin <=16 Susceptible ug/mL     Trimethoprim/Sulfamethoxazole <=1/19 Susceptible ug/mL     * ESBL (extended spectrum beta lactamase) producing organisms require contact precautions.   Results for orders placed or performed during the hospital encounter of 07/11/18   Urine Culture    Specimen: Urine catheter; Catheterized Urine   Result Value Ref Range    Specimen Description Catheterized Urine     Special Requests Specimen received in preservative     Culture Micro >100,000 colonies/mL  Klebsiella pneumoniae   (A)        Susceptibility    Klebsiella pneumoniae - MARIAA     Ampicillin*  Resistant ug/mL      * Intrinsically Resistant     Cefazolin* <=4 Susceptible ug/mL      * Cefazolin MARIAA breakpoints are for the treatment of uncomplicated urinary tract infections.  For the treatment of systemic infections, please contact the laboratory for additional testing.     Cefoxitin <=4 Susceptible ug/mL     Ceftazidime <=1 Susceptible ug/mL     Ceftriaxone <=1 Susceptible ug/mL     Ciprofloxacin <=0.25 Susceptible ug/mL     Gentamicin <=1 Susceptible ug/mL     Levofloxacin <=0.12 Susceptible ug/mL     Nitrofurantoin 64 Intermediate ug/mL     Tobramycin <=1 Susceptible ug/mL     Trimethoprim/Sulfamethoxazole <=1/19 Susceptible ug/mL     Ampicillin/Sulbactam 4 Susceptible ug/mL     Piperacillin/Tazo <=4 Susceptible ug/mL     Cefepime <=1 Susceptible ug/mL   Results for orders placed or performed during the hospital encounter of 12/01/14   Urine culture    Specimen: Urine catheter   Result Value Ref Range    Specimen Description Catheterized Urine     Special Requests Specimen  received in preservative     Culture Micro (A)      >100,000 colonies/mL Coagulase negative Staphylococcus    Micro Report Status FINAL 12/02/2014     Organism:       >100,000 colonies/mL Coagulase negative Staphylococcus       Susceptibility    >100,000 colonies/ml coagulase negative staphylococcus (ant) -  (no method available)     Ciprofloxacin <=0.5 Susceptible  ug/mL     Gentamicin <=0.5 Susceptible  ug/mL     Levofloxacin 0.25 Susceptible  ug/mL     Nitrofurantoin <=16 Susceptible  ug/mL     Oxacillin <=0.25 Susceptible  ug/mL     Penicillin >2.0 Resistant  ug/mL     Tetracycline <=1 Susceptible  ug/mL     Vancomycin 1 Susceptible  ug/mL

## 2023-02-14 NOTE — PROGRESS NOTES
[facility]    Medicine Progress Note - Hospitalist Service       Date of Admission:  2/9/2023    Assessment & Plan   Sam Barry is a 77 year old male with hx of HTN and HLD who was admitted on 2/9/2023 for acute metabolic encephalopathy initially concerning for CVA. Pt was subsequently found to have an ESBL E.coli UTI    Acute metabolic encephalopathy due to ESBL E coli UTI, Resolving  * Presented with acute onset confusion; wife reports patient was speaking gibberish then became less responsive with subsequent shaking-like movement in his extremities  * Head CT/CTA and brain MRI on admission showed no acute pathology  * General Neurology consulted on admission  * Empirically initiated on levetiracetam on admission, but was subsequently discontinued following negative EEG. Neurology felt strongly that UTI was reason for AMS and not seizure  * Empirically initiated on ceftriaxone in the ED for UTI. Broadened to meropenem on 2/11 when urine culture returned with ESBL UTI.  * ID consulted during admission  - Now back to baseline mental status; has some situational confusion, but otherwise alert, oriented, and appropriate  - Continues on meropenem while hospitalized, Bactrim at discharge to complete total 10 day course per ID recs  - 2/12 blood cultures x2 negative    Mild cognitive impairment  Reported parkinsonian features  * Wife reports shuffling gait, tremor, and cognitive impairment over the last 6 to 12 months  - Recommend follow up with General Neurology in 4-6 weeks once recovered from UTI     Otherwise chronic and stable medical conditions  Hypertension  Hyperlipidemia  Osteopenia     Diet: Regular Diet Adult  Room Service    DVT Prophylaxis: Pneumatic Compression Devices  Cardenas Catheter: Not present  Code Status: Full Code         Disposition: Expected discharge to TCU once placement found. Patient is medically ready    The patient's care was discussed with the Bedside Nurse  and Patient.    Cheri Dean MD  Hospitalist Service  [facility]  Contact information available via [facility] Paging/Directory    ______________________________________________________________________    Interval History   No acute events overnight. Offers no complaints. Denies aches/pains, cp/sob.     Data reviewed today: I reviewed all medications, new labs and imaging results over the last 24 hours. I personally reviewed no images or EKG's today.    Physical Exam   Vital Signs: Temp: 99.8  F (37.7  C) Temp src: Oral BP: (!) 141/98 Pulse: 91   Resp: 16 SpO2: 95 % O2 Device: None (Room air)    Weight: 181 lbs 10.54 oz  Constitutional: Resting comfortably, NAD  HEENT: Sclera white, MMM  Respiratory: Breathing non-labored. Lungs CTAB - no wheezes, crackles, or rhonchi  Cardiovascular: Heart RRR, no m/r/g. No pedal edema  GI: +BS, abd soft/NT  Skin/Integument: No rash  Musculoskeletal: Normal muscle bulk and tone  Neuro: Alert and appropriate, +situational confusion, MOYA  Psych: Calm and cooperative    Data   Recent Labs   Lab 02/13/23  2137 02/13/23  1659 02/10/23  0803 02/09/23  1428   WBC  --   --  9.8 11.0   HGB  --   --  13.4 13.8   MCV  --   --  90 90   PLT  --   --  243 277   INR  --   --   --  1.17*   NA  --   --  136 138   POTASSIUM  --   --  3.5 3.8   CHLORIDE  --   --  101 98   CO2  --   --  26 20*   BUN  --   --  8.6 10.1   CR  --   --  0.99 1.09   ANIONGAP  --   --  9 20*   KATIE  --   --  9.4 9.9   * 97 105* 159*         No results found for this or any previous visit (from the past 24 hour(s)).    Medications       atorvastatin  20 mg Oral Daily     lisinopril  20 mg Oral Daily     meropenem  1 g Intravenous Q8H     sodium chloride (PF)  3 mL Intracatheter Q8H

## 2023-02-15 ENCOUNTER — APPOINTMENT (OUTPATIENT)
Dept: OCCUPATIONAL THERAPY | Facility: CLINIC | Age: 78
DRG: 689 | End: 2023-02-15
Attending: INTERNAL MEDICINE
Payer: COMMERCIAL

## 2023-02-15 ENCOUNTER — APPOINTMENT (OUTPATIENT)
Dept: PHYSICAL THERAPY | Facility: CLINIC | Age: 78
DRG: 689 | End: 2023-02-15
Attending: INTERNAL MEDICINE
Payer: COMMERCIAL

## 2023-02-15 PROCEDURE — 250N000013 HC RX MED GY IP 250 OP 250 PS 637: Performed by: INTERNAL MEDICINE

## 2023-02-15 PROCEDURE — 250N000013 HC RX MED GY IP 250 OP 250 PS 637: Performed by: STUDENT IN AN ORGANIZED HEALTH CARE EDUCATION/TRAINING PROGRAM

## 2023-02-15 PROCEDURE — 97116 GAIT TRAINING THERAPY: CPT | Mod: GP

## 2023-02-15 PROCEDURE — 99231 SBSQ HOSP IP/OBS SF/LOW 25: CPT | Performed by: INTERNAL MEDICINE

## 2023-02-15 PROCEDURE — 120N000001 HC R&B MED SURG/OB

## 2023-02-15 PROCEDURE — 250N000011 HC RX IP 250 OP 636: Performed by: HOSPITALIST

## 2023-02-15 PROCEDURE — 97535 SELF CARE MNGMENT TRAINING: CPT | Mod: GO

## 2023-02-15 PROCEDURE — 97530 THERAPEUTIC ACTIVITIES: CPT | Mod: GP

## 2023-02-15 RX ADMIN — LISINOPRIL 20 MG: 20 TABLET ORAL at 08:14

## 2023-02-15 RX ADMIN — ACETAMINOPHEN 650 MG: 325 TABLET, FILM COATED ORAL at 22:41

## 2023-02-15 RX ADMIN — MEROPENEM 1 G: 1 INJECTION, POWDER, FOR SOLUTION INTRAVENOUS at 13:46

## 2023-02-15 RX ADMIN — ATORVASTATIN CALCIUM 20 MG: 20 TABLET, FILM COATED ORAL at 08:14

## 2023-02-15 RX ADMIN — MEROPENEM 1 G: 1 INJECTION, POWDER, FOR SOLUTION INTRAVENOUS at 06:35

## 2023-02-15 RX ADMIN — MEROPENEM 1 G: 1 INJECTION, POWDER, FOR SOLUTION INTRAVENOUS at 22:26

## 2023-02-15 ASSESSMENT — ACTIVITIES OF DAILY LIVING (ADL)
ADLS_ACUITY_SCORE: 45
ADLS_ACUITY_SCORE: 43
ADLS_ACUITY_SCORE: 45

## 2023-02-15 NOTE — PLAN OF CARE
Reason for Admission: UTI/ metabolic encephalopathy     Cognitive/Mentation: A/Ox 2  Neuros/CMS: confusion and generalized weakness  VS: stable.  GI: BS present, passing flatus, last BM 2/14. incontinent.  : incontinent.  Pulmonary: LS clear.  Pain: stiffness from being in bed repositioning provided.     Drains/Lines: PIV patent and intact  Skin: CDI  Activity: Assist x 1 with GBW.  Diet: reg with thin liquids. Takes pills crush in apple sauce.     Therapies recs: TCU  Discharge: pending    Aggression Stoplight Tool: green

## 2023-02-15 NOTE — PROGRESS NOTES
Reason for Admission: UTI/ metabolic encephalopathy      Cognitive/Mentation: A/Ox 2  Neuros/CMS: confusion and generalized weakness LE less than UE  VS: stable.  GI: BS present, passing flatus, last BM 2/14. incontinent.  : incontinent.  Pulmonary: LS clear.  Pain: stiffness from being in bed, encouraged movement and pillows provided for comfort  Drains/Lines: PIV patent and intact  Skin: CDI  Activity: Assist x 1 with GBW.  Diet: reg with thin liquids. Takes pills crush in apple sauce.      Therapies recs: TCU  Discharge: pending     Aggression Stoplight Tool: green

## 2023-02-15 NOTE — PLAN OF CARE
Pt here with confusion and UTI. A&Ox4. Neuros stable with generalized weakness. SBP<180. Regular diet, thin liquids. Takes pills whole. Up with A1 GBW. Denies pain. Pt scoring green on the Aggression Stop Light Tool. Plan TCU. Discharge pending.

## 2023-02-15 NOTE — CONSULTS
Care Management Initial Consult    General Information  Assessment completed with: VM-chart review,    Type of CM/SW Visit: Initial Assessment    Primary Care Provider verified and updated as needed: Yes   Readmission within the last 30 days: no previous admission in last 30 days      Reason for Consult: discharge planning  Advance Care Planning: Advance Care Planning Reviewed: other (see comments) (No acp docs)          Communication Assessment  Patient's communication style: spoken language (English or Bilingual)    Hearing Difficulty or Deaf: no        Cognitive  Cognitive/Neuro/Behavioral: WDL  Level of Consciousness: alert  Arousal Level: opens eyes spontaneously  Orientation: oriented x 4  Mood/Behavior: calm, cooperative  Best Language: 0 - No aphasia  Speech: slow    Living Environment:   People in home: spouse  Natalia  Current living Arrangements: house      Able to return to prior arrangements: yes       Family/Social Support:  Care provided by: self, spouse/significant other  Provides care for: no one  Marital Status:   Wife  Natalia       Description of Support System: Involved, Supportive    Support Assessment: Adequate social supports, Adequate family and caregiver support    Current Resources:   Patient receiving home care services: No     Community Resources: None  Equipment currently used at home: walker, rolling  Supplies currently used at home: None    Employment/Financial:  Employment Status: retired        Financial Concerns:             Lifestyle & Psychosocial Needs:  Social Determinants of Health     Tobacco Use: Not on file   Alcohol Use: Not on file   Financial Resource Strain: Not on file   Food Insecurity: Not on file   Transportation Needs: Not on file   Physical Activity: Not on file   Stress: Not on file   Social Connections: Not on file   Intimate Partner Violence: Not on file   Depression: Not on file   Housing Stability: Not on file       Functional Status:  Prior to admission  patient needed assistance:              Mental Health Status:          Chemical Dependency Status:                Values/Beliefs:  Spiritual, Cultural Beliefs, Latter day Practices, Values that affect care: no               Additional Information:  Per care management/social work consult for discharge planning, patient was admitted on 2/9/2023 with suspected new onset seizure. Tentative date of discharge is 2/15/2023. Writer left a message for Natalia (spouse) asking for a return call since patient is confused. Writer did a chart review. Patient lives in a house with spouse. TCU is recommended and patient has [employer]. Since patient is medically ready and has [employer] which requires auth, writer sent out referrals. Writer sent tcu referrals via [facility] to [facility], [facility], [facility], [facility], and [facility].     Will continue to follow.    LEIDY Fisher

## 2023-02-15 NOTE — PLAN OF CARE
Goal Outcome Evaluation:       Reason for Admission: UTI/ metabolic encephalopathy      Cognitive/Mentation: A/Ox 2  Neuros/CMS: confusion and generalized weakness LE less than UE  VS: stable.  GI: BS present, passing flatus, last BM 2/14. incontinent.  : incontinent.  Pulmonary: LS clear.  Pain: stiffness from being in bed, encouraged movement and pillows provided for comfort  Drains/Lines: PIV patent and intact  Skin: CDI  Activity: Assist x 1 with GBW.  Diet: reg with thin liquids. Takes pills whole      Therapies recs: TCU  Discharge: pending     Aggression Stoplight Tool: green

## 2023-02-15 NOTE — PROGRESS NOTES
[facility]    Medicine Progress Note - Hospitalist Service       Date of Admission:  2/9/2023    Assessment & Plan   Sam Barry is a 77 year old male with hx of HTN and HLD who was admitted on 2/9/2023 for acute metabolic encephalopathy initially concerning for CVA. Pt was subsequently found to have an ESBL E.coli UTI    Acute metabolic encephalopathy due to ESBL E coli UTI, Resolving  * Presented with acute onset confusion; wife reports patient was speaking gibberish then became less responsive with subsequent shaking-like movement in his extremities  * Head CT/CTA and brain MRI on admission showed no acute pathology  * General Neurology consulted on admission  * Empirically initiated on levetiracetam on admission, but was subsequently discontinued following negative EEG. Neurology felt strongly that UTI was reason for AMS and not seizure  * Empirically initiated on ceftriaxone in the ED for UTI. Broadened to meropenem on 2/11 when urine culture returned with ESBL UTI.  * ID consulted during admission  - Now back to baseline mental status; has some situational confusion, but otherwise alert, oriented, and appropriate  - Continues on meropenem while hospitalized, Bactrim at discharge to complete total 10 day course per ID recs (last day: 2/20)  - 2/12 blood cultures x2 negative    Mild cognitive impairment  Reported parkinsonian features  * Wife reports shuffling gait, tremor, and cognitive impairment over the last 6 to 12 months  - Recommend follow up with General Neurology in 4-6 weeks once recovered from UTI     Otherwise chronic and stable medical conditions  Hypertension  Hyperlipidemia  Osteopenia     Diet: Regular Diet Adult  Room Service  Diet    DVT Prophylaxis: Pneumatic Compression Devices  Cardenas Catheter: Not present  Code Status: Full Code         Disposition: Expected discharge to TCU once placement found. Patient is medically ready    The patient's care was discussed  with the Patient.    Cheri Dean MD  Hospitalist Service  [facility]  Contact information available via [facility] Paging/Directory    ______________________________________________________________________    Interval History   No acute events overnight. Offers no complaints. Denies aches/pains, cp/sob. Awaiting TCU    Data reviewed today: I reviewed all medications, new labs and imaging results over the last 24 hours. I personally reviewed no images or EKG's today.    Physical Exam   Vital Signs: Temp: 98.6  F (37  C) Temp src: Oral BP: 125/79 Pulse: 102   Resp: 16 SpO2: 94 % O2 Device: None (Room air)    Weight: 181 lbs 10.54 oz  Constitutional: Resting comfortably, NAD  Respiratory: Breathing non-labored.   Skin/Integument: No rash  Musculoskeletal: Normal muscle bulk and tone  Neuro: Alert and appropriate, +situational confusion, MOYA  Psych: Calm and cooperative    Data   Recent Labs   Lab 02/13/23  2137 02/13/23  1659 02/10/23  0803 02/09/23  1428   WBC  --   --  9.8 11.0   HGB  --   --  13.4 13.8   MCV  --   --  90 90   PLT  --   --  243 277   INR  --   --   --  1.17*   NA  --   --  136 138   POTASSIUM  --   --  3.5 3.8   CHLORIDE  --   --  101 98   CO2  --   --  26 20*   BUN  --   --  8.6 10.1   CR  --   --  0.99 1.09   ANIONGAP  --   --  9 20*   KATIE  --   --  9.4 9.9   * 97 105* 159*         No results found for this or any previous visit (from the past 24 hour(s)).    Medications       atorvastatin  20 mg Oral Daily     lisinopril  20 mg Oral Daily     meropenem  1 g Intravenous Q8H     sodium chloride (PF)  3 mL Intracatheter Q8H

## 2023-02-16 ENCOUNTER — APPOINTMENT (OUTPATIENT)
Dept: PHYSICAL THERAPY | Facility: CLINIC | Age: 78
DRG: 689 | End: 2023-02-16
Attending: INTERNAL MEDICINE
Payer: COMMERCIAL

## 2023-02-16 ENCOUNTER — APPOINTMENT (OUTPATIENT)
Dept: OCCUPATIONAL THERAPY | Facility: CLINIC | Age: 78
DRG: 689 | End: 2023-02-16
Attending: INTERNAL MEDICINE
Payer: COMMERCIAL

## 2023-02-16 PROCEDURE — 97530 THERAPEUTIC ACTIVITIES: CPT | Mod: GO

## 2023-02-16 PROCEDURE — 97116 GAIT TRAINING THERAPY: CPT | Mod: GP

## 2023-02-16 PROCEDURE — 250N000013 HC RX MED GY IP 250 OP 250 PS 637: Performed by: STUDENT IN AN ORGANIZED HEALTH CARE EDUCATION/TRAINING PROGRAM

## 2023-02-16 PROCEDURE — 120N000001 HC R&B MED SURG/OB

## 2023-02-16 PROCEDURE — 99231 SBSQ HOSP IP/OBS SF/LOW 25: CPT | Performed by: INTERNAL MEDICINE

## 2023-02-16 PROCEDURE — 250N000011 HC RX IP 250 OP 636: Performed by: HOSPITALIST

## 2023-02-16 PROCEDURE — 97535 SELF CARE MNGMENT TRAINING: CPT | Mod: GO

## 2023-02-16 PROCEDURE — 250N000013 HC RX MED GY IP 250 OP 250 PS 637: Performed by: INTERNAL MEDICINE

## 2023-02-16 RX ADMIN — MEROPENEM 1 G: 1 INJECTION, POWDER, FOR SOLUTION INTRAVENOUS at 22:19

## 2023-02-16 RX ADMIN — LISINOPRIL 20 MG: 20 TABLET ORAL at 08:05

## 2023-02-16 RX ADMIN — ATORVASTATIN CALCIUM 20 MG: 20 TABLET, FILM COATED ORAL at 08:05

## 2023-02-16 RX ADMIN — MEROPENEM 1 G: 1 INJECTION, POWDER, FOR SOLUTION INTRAVENOUS at 06:27

## 2023-02-16 RX ADMIN — MEROPENEM 1 G: 1 INJECTION, POWDER, FOR SOLUTION INTRAVENOUS at 14:42

## 2023-02-16 RX ADMIN — ACETAMINOPHEN 650 MG: 325 TABLET, FILM COATED ORAL at 08:13

## 2023-02-16 ASSESSMENT — ACTIVITIES OF DAILY LIVING (ADL)
ADLS_ACUITY_SCORE: 43

## 2023-02-16 NOTE — PROGRESS NOTES
A&Ox4. Speech is slow and mumbles, but this is baseline. IV abx continue. ID following. Ambulating assist of 1 with walker. Voiding, incontinent at times. Tolerating regular diet. Tylenol given x1 for back pain. Plan to discharge to TCU once placement is found.

## 2023-02-16 NOTE — PLAN OF CARE
A&O to self and place, not to situation and time. VSS on room air. CMS intact w/generalized weakness. Lungs clear. BS+, last BM 2/15, flatus+. Skin intact. Tolerating regular diet. Denies N/V. Voiding with urinal, incontinent at times. Denies pain. Up w/1, gait belt and walker. Right arm IV saline locked.

## 2023-02-16 NOTE — PLAN OF CARE
Pt here with confusion and UTI. A&Ox4. Neuros stable with generalized weakness. SBP<180. Regular diet, thin liquids. Takes pills whole. Up with A1 GBW. Denies pain. Pt scoring green on the Aggression Stop Light Tool. Plan TCU. Discharge pending placement.

## 2023-02-16 NOTE — PROGRESS NOTES
[facility]    Medicine Progress Note - Hospitalist Service       Date of Admission:  2/9/2023    Assessment & Plan   Sam Barry is a 77 year old male with hx of HTN and HLD who was admitted on 2/9/2023 for acute metabolic encephalopathy initially concerning for CVA. Pt was subsequently found to have an ESBL E.coli UTI    Acute metabolic encephalopathy due to ESBL E coli UTI, Resolving  * Presented with acute onset confusion; wife reports patient was speaking gibberish then became less responsive with subsequent shaking-like movement in his extremities  * Head CT/CTA and brain MRI on admission showed no acute pathology  * General Neurology consulted on admission  * Empirically initiated on levetiracetam on admission, but was subsequently discontinued following negative EEG. Neurology felt strongly that UTI was reason for AMS and not seizure  * Empirically initiated on ceftriaxone in the ED for UTI. Broadened to meropenem on 2/11 when urine culture returned with ESBL UTI.  * ID consulted during admission  - Now back to baseline mental status; has some situational confusion, but otherwise alert, oriented, and appropriate  - Continues on meropenem while hospitalized, Bactrim at discharge to complete total 10 day course per ID recs (last day: 2/20)  - 2/12 blood cultures x2 negative    Mild cognitive impairment  Reported parkinsonian features  * Wife reports shuffling gait, tremor, and cognitive impairment over the last 6 to 12 months  - Recommend follow up with General Neurology in 4-6 weeks once recovered from UTI     Otherwise chronic and stable medical conditions  Hypertension  Hyperlipidemia  Osteopenia     Diet: Regular Diet Adult  Room Service  Diet    DVT Prophylaxis: Pneumatic Compression Devices  Cardenas Catheter: Not present  Code Status: Full Code         Disposition: Expected discharge to TCU once placement found. Awaiting insurance auth. Patient is medically ready    The  patient's care was discussed with the Patient.    Cheri Dean MD  Hospitalist Service  [facility]  Contact information available via [facility] Paging/Directory    ______________________________________________________________________    Interval History   No acute events overnight. Offers no complaints. Denies aches/pains, cp/sob. Awaiting TCU    Data reviewed today: I reviewed all medications, new labs and imaging results over the last 24 hours. I personally reviewed no images or EKG's today.    Physical Exam   Vital Signs: Temp: 98.7  F (37.1  C) Temp src: Oral BP: (!) 132/92 Pulse: 75   Resp: 16 SpO2: 97 % O2 Device: None (Room air)    Weight: 181 lbs 10.54 oz  Constitutional: Resting comfortably, NAD  Respiratory: Breathing non-labored.   Skin/Integument: No rash  Musculoskeletal: Normal muscle bulk and tone  Neuro: Alert and appropriate, +situational confusion, MOYA  Psych: Calm and cooperative    Data   Recent Labs   Lab 02/13/23  2137 02/13/23  1659 02/10/23  0803 02/09/23  1428   WBC  --   --  9.8 11.0   HGB  --   --  13.4 13.8   MCV  --   --  90 90   PLT  --   --  243 277   INR  --   --   --  1.17*   NA  --   --  136 138   POTASSIUM  --   --  3.5 3.8   CHLORIDE  --   --  101 98   CO2  --   --  26 20*   BUN  --   --  8.6 10.1   CR  --   --  0.99 1.09   ANIONGAP  --   --  9 20*   KATEI  --   --  9.4 9.9   * 97 105* 159*         No results found for this or any previous visit (from the past 24 hour(s)).    Medications       atorvastatin  20 mg Oral Daily     lisinopril  20 mg Oral Daily     meropenem  1 g Intravenous Q8H     sodium chloride (PF)  3 mL Intracatheter Q8H

## 2023-02-16 NOTE — PLAN OF CARE
Reason for Admission: UTI/ metabolic encephalopathy      Cognitive/Mentation: A/Ox 2  Neuros/CMS: confusion and generalized weakness LE less than UE  Cardiac: VSS, no tele  Pulmonary: LS clear  GI/:reg with thin liquids. Takes pills whole.  BS present, passing flatus, incontinent.  Drains/Lines: PIV patent and intact  Skin: CDI  Activity: Assist x 1 with GBW.  Plan: TCU placement pending       Pt slept peacefully, no acute changes overnight

## 2023-02-17 ENCOUNTER — APPOINTMENT (OUTPATIENT)
Dept: PHYSICAL THERAPY | Facility: CLINIC | Age: 78
DRG: 689 | End: 2023-02-17
Attending: INTERNAL MEDICINE
Payer: COMMERCIAL

## 2023-02-17 ENCOUNTER — APPOINTMENT (OUTPATIENT)
Dept: OCCUPATIONAL THERAPY | Facility: CLINIC | Age: 78
DRG: 689 | End: 2023-02-17
Attending: INTERNAL MEDICINE
Payer: COMMERCIAL

## 2023-02-17 LAB
BACTERIA BLD CULT: NO GROWTH
BACTERIA BLD CULT: NO GROWTH

## 2023-02-17 PROCEDURE — 250N000013 HC RX MED GY IP 250 OP 250 PS 637: Performed by: INTERNAL MEDICINE

## 2023-02-17 PROCEDURE — 99231 SBSQ HOSP IP/OBS SF/LOW 25: CPT | Performed by: INTERNAL MEDICINE

## 2023-02-17 PROCEDURE — 97535 SELF CARE MNGMENT TRAINING: CPT | Mod: GO

## 2023-02-17 PROCEDURE — 97116 GAIT TRAINING THERAPY: CPT | Mod: GP

## 2023-02-17 PROCEDURE — 250N000011 HC RX IP 250 OP 636: Performed by: HOSPITALIST

## 2023-02-17 PROCEDURE — 120N000001 HC R&B MED SURG/OB

## 2023-02-17 PROCEDURE — 250N000013 HC RX MED GY IP 250 OP 250 PS 637: Performed by: STUDENT IN AN ORGANIZED HEALTH CARE EDUCATION/TRAINING PROGRAM

## 2023-02-17 RX ADMIN — LISINOPRIL 20 MG: 20 TABLET ORAL at 10:12

## 2023-02-17 RX ADMIN — MEROPENEM 1 G: 1 INJECTION, POWDER, FOR SOLUTION INTRAVENOUS at 22:13

## 2023-02-17 RX ADMIN — ATORVASTATIN CALCIUM 20 MG: 20 TABLET, FILM COATED ORAL at 10:12

## 2023-02-17 RX ADMIN — MEROPENEM 1 G: 1 INJECTION, POWDER, FOR SOLUTION INTRAVENOUS at 06:12

## 2023-02-17 RX ADMIN — MEROPENEM 1 G: 1 INJECTION, POWDER, FOR SOLUTION INTRAVENOUS at 14:31

## 2023-02-17 RX ADMIN — ACETAMINOPHEN 650 MG: 325 TABLET, FILM COATED ORAL at 10:12

## 2023-02-17 ASSESSMENT — ACTIVITIES OF DAILY LIVING (ADL)
ADLS_ACUITY_SCORE: 43

## 2023-02-17 NOTE — PROGRESS NOTES
"SPIRITUAL HEALTH SERVICES Progress Note    [facility]    Saw pt Sam Barry per length of stay protocol.      Patient/Family Understanding of Illness and Goals of Care - In conversation, Sam reported that he didn't initially know why he was being brought into [facility]. However, he now understands that he has \"an infection.\" Today, he reports that he's feeling better, but not at his best.       Distress and Loss - Sam reflected on how he hasn't had a lot of sickness in his life and this hospitalization was kind of a new experience for him. I offered support at the bedside and acknowledged the distress from this admission.        Strengths, Coping, and Resources - He noted that his family had recently been in to visit with him and that they were providing adequate support.       Meaning, Beliefs, and Spirituality - Pt reports that he goes to a local [employer] in [facility], but did not name the [employer]. I introduced him to  support services and he denies needs today. Sam does, however, appreciate general conversation with the .        Plan of Care -  remains available while patient is admitted to [facility]. I will continue to follow while admitted to unit 73. Please consult if any immediate needs arise.     ------  Maxwell Colunga M.Div.  Resident   Pager: [phone]  "

## 2023-02-17 NOTE — PROGRESS NOTES
Care Management Follow Up    Length of Stay (days): 8    Expected Discharge Date: 02/18/2023     Concerns to be Addressed:       Patient plan of care discussed at interdisciplinary rounds: Yes    Anticipated Discharge Disposition: Transitional Care  [facility] has clinically accepted pt for admission and is seeking insurance authorization through [employer].   Anticipated Discharge Services: None  Anticipated Discharge DME: None    Patient/family educated on Medicare website which has current facility and service quality ratings: yes  Education Provided on the Discharge Plan:  yes  Patient/Family in Agreement with the Plan: yes    Referrals Placed by CM/SW:  LISET has spoken with Savanna at [facility] regarding insurance authorization and she has not heard back from [employer] yet.  Private pay costs discussed:     Additional Information:  LISET received call from pt's wife, Natalia stating that she does not want pt to discharge to TCU as they are too far away. Reviewed [employer] TCU's and she is requesting to have pt discharge home with OP therapies. Reviewed PT recommendations from today and if she is able to be next to pt with all mobility, they recommend home discharge with OP therapy. This is what wife would prefer. She has been assisting pt for awhile prior to admission and  Pt's son is also available to assist with pt. Natalia states that her neighbors are a source of support also.  Virgie will call LISET in am to arrange home discharge if pt is medically ready to discharge.  LISET  followed up with Savanna at [facility] to take pt off admission list.    LEIDY Hinkle  [facility]  Care Transitions  [phone]      ,

## 2023-02-17 NOTE — PLAN OF CARE
Goal Outcome Evaluation: [id number]    A&O x2, disoriented to situation and time. VSS on RA q4. Up 1 assist GB/RW, set off bed alarm x1. Was incontinent at start of shift, then did use urinal x2 overnight with some incontinence, set off bed alarm both times. Denies pain. Skin WNL. PIV SL between intermittent IV antibiotic. On a regular diet. Contact precautions maintained for ESBL.    Discharge pending TCU placement

## 2023-02-17 NOTE — PROGRESS NOTES
Shift Summary [id number]    Admitting Diagnosis: Seizure disorder (H) [G40.909]   Vitals WNL.   Pain C/O pain in the right hip. Received Tylenol prn.   A&Ox3, with confusion.    Voiding : bladder incontinence.   Mobility : AX1 walker GB.   Tele na.   CMS : wnl.   Lung Sounds clear on room air .   GI : WNL  Dressing : IV site intact.     Orders Placed This Encounter      Regular Diet Adult      Diet       Plan:     Awaiting TCU placement.

## 2023-02-17 NOTE — PROGRESS NOTES
[facility]    Medicine Progress Note - Hospitalist Service       Date of Admission:  2/9/2023    Assessment & Plan   Sam Barry is a 77 year old male with hx of HTN and HLD who was admitted on 2/9/2023 for acute metabolic encephalopathy initially concerning for CVA. Pt was subsequently found to have an ESBL E.coli UTI    Acute metabolic encephalopathy due to ESBL E coli UTI, Resolving  * Presented with acute onset confusion; wife reports patient was speaking gibberish then became less responsive with subsequent shaking-like movement in his extremities  * Head CT/CTA and brain MRI on admission showed no acute pathology  * General Neurology consulted on admission  * Empirically initiated on levetiracetam on admission, but was subsequently discontinued following negative EEG. Neurology felt strongly that UTI was reason for AMS and not seizure  * Empirically initiated on ceftriaxone in the ED for UTI. Broadened to meropenem on 2/11 when urine culture returned with ESBL UTI.  * ID consulted during admission  - Now back to baseline mental status; oriented only to self and place, but otherwise alert and appropriate  - Continues on meropenem while hospitalized, Bactrim at discharge to complete total 10 day course per ID recs (last day: 2/20)  - 2/12 blood cultures x2 negative    Mild cognitive impairment  Reported parkinsonian features  * Wife reports shuffling gait, tremor, and cognitive impairment over the last 6 to 12 months  - Recommend follow up with General Neurology in 4-6 weeks once recovered from UTI     Otherwise chronic and stable medical conditions  Hypertension  Hyperlipidemia  Osteopenia     Diet: Regular Diet Adult  Room Service  Diet    DVT Prophylaxis: Pneumatic Compression Devices  Cardenas Catheter: Not present  Code Status: Full Code         Disposition: Expected discharge to TCU once placement found. Awaiting insurance auth. Patient is medically ready    The patient's care  was discussed with the Patient.    Cheri Dean MD  Hospitalist Service  [facility]  Contact information available via [facility] Paging/Directory    ______________________________________________________________________    Interval History   No acute events overnight. Offers no complaints. Denies aches/pains, cp/sob. Awaiting TCU    Data reviewed today: I reviewed all medications, new labs and imaging results over the last 24 hours. I personally reviewed no images or EKG's today.    Physical Exam   Vital Signs: Temp: 98.1  F (36.7  C) Temp src: Oral BP: (!) 131/91 Pulse: 78   Resp: 16 SpO2: 97 % O2 Device: None (Room air)    Weight: 181 lbs 10.54 oz  Constitutional: Resting comfortably, NAD  Respiratory: Breathing non-labored.   Skin/Integument: No rash  Musculoskeletal: Normal muscle bulk and tone  Neuro: Alert and appropriate, Oriented to self and place, not to year or situation, MOYA  Psych: Calm and cooperative    Data   Recent Labs   Lab 02/13/23  2137 02/13/23  1659   * 97         No results found for this or any previous visit (from the past 24 hour(s)).    Medications       atorvastatin  20 mg Oral Daily     lisinopril  20 mg Oral Daily     meropenem  1 g Intravenous Q8H     sodium chloride (PF)  3 mL Intracatheter Q8H

## 2023-02-17 NOTE — DISCHARGE INSTRUCTIONS
Please follow up with neurology in 4-6 weeks. You may call the following neurology clinic for an appointment or a clinic of your choice. Tell them you were recently hospitalized and need follow up as recommended at discharge.    [facility] - Dr. Baker  [address] [place] [address], [address]  [place] [address]  Appointments:  [phone]

## 2023-02-17 NOTE — PROGRESS NOTES
Care Management Follow Up    Length of Stay (days): 8    Expected Discharge Date: 02/17/2023     Concerns to be Addressed:       Patient plan of care discussed at interdisciplinary rounds: Yes    Anticipated Discharge Disposition: Transitional Care     Anticipated Discharge Services: None  Anticipated Discharge DME: None    Patient/family educated on Medicare website which has current facility and service quality ratings: yes  Education Provided on the Discharge Plan:    Patient/Family in Agreement with the Plan: yes    Referrals Placed by CM/SW:    Private pay costs discussed: Not applicable    Additional Information:  Referral had been sent to [facility] for home care.  [facility] is out of network with patient's insurance and they were working on finding in network agency.  Updated Sherine DESAI with [facility] that patient is planning on TCU at discharge so to cancel referrals for HHC for now.  She recommended [facility] is in network agency with availability if plan changes.    Geraldine Finley RN, BSN, PHN  Inpatient Care Coordination  [facility]  Phone: [phone]

## 2023-02-18 PROCEDURE — 250N000013 HC RX MED GY IP 250 OP 250 PS 637: Performed by: STUDENT IN AN ORGANIZED HEALTH CARE EDUCATION/TRAINING PROGRAM

## 2023-02-18 PROCEDURE — 250N000013 HC RX MED GY IP 250 OP 250 PS 637: Performed by: INTERNAL MEDICINE

## 2023-02-18 PROCEDURE — 99232 SBSQ HOSP IP/OBS MODERATE 35: CPT | Performed by: HOSPITALIST

## 2023-02-18 PROCEDURE — 250N000011 HC RX IP 250 OP 636: Performed by: HOSPITALIST

## 2023-02-18 PROCEDURE — 250N000013 HC RX MED GY IP 250 OP 250 PS 637: Performed by: HOSPITALIST

## 2023-02-18 PROCEDURE — 120N000001 HC R&B MED SURG/OB

## 2023-02-18 RX ORDER — METHOCARBAMOL 500 MG/1
500 TABLET, FILM COATED ORAL 2 TIMES DAILY
Status: DISCONTINUED | OUTPATIENT
Start: 2023-02-18 | End: 2023-02-20 | Stop reason: HOSPADM

## 2023-02-18 RX ADMIN — MEROPENEM 1 G: 1 INJECTION, POWDER, FOR SOLUTION INTRAVENOUS at 21:45

## 2023-02-18 RX ADMIN — LISINOPRIL 20 MG: 20 TABLET ORAL at 09:05

## 2023-02-18 RX ADMIN — METHOCARBAMOL 500 MG: 500 TABLET ORAL at 20:37

## 2023-02-18 RX ADMIN — MEROPENEM 1 G: 1 INJECTION, POWDER, FOR SOLUTION INTRAVENOUS at 07:08

## 2023-02-18 RX ADMIN — ATORVASTATIN CALCIUM 20 MG: 20 TABLET, FILM COATED ORAL at 09:05

## 2023-02-18 RX ADMIN — METHOCARBAMOL 500 MG: 500 TABLET ORAL at 13:06

## 2023-02-18 RX ADMIN — ACETAMINOPHEN 650 MG: 325 TABLET, FILM COATED ORAL at 09:05

## 2023-02-18 RX ADMIN — MEROPENEM 1 G: 1 INJECTION, POWDER, FOR SOLUTION INTRAVENOUS at 14:39

## 2023-02-18 ASSESSMENT — ACTIVITIES OF DAILY LIVING (ADL)
ADLS_ACUITY_SCORE: 43

## 2023-02-18 NOTE — PLAN OF CARE
Goal Outcome Evaluation:      Plan of Care Reviewed With: patient        Metabolic encephalopathy/UTI. Disoriented to date & situation. Arouses to voice, moving all extremities spontaneously; no dizziness or shortness of breath noted.  VSS, RA. Regular diet/meds whole with water/good appetite/set up provided. Up with 1 assist/gait belt & walker to recliner/back discomfort & spasms perhaps when head of bed elevated. Incontinent & continent via urinal/no bm today. Back discomfort with movement/tylenol prn, robaxin scheduled & added today/warm packs provided. Pt scoring green on the Aggression Stop Light Tool. Anticipate TCU needs at discharge or Home with outpatient therapies once medically stable/follow up with neurology as outpatient for slow shuffle gait as noted. Contact precautions maintained.

## 2023-02-18 NOTE — PLAN OF CARE
Pt is A/Ox2-3. Incontinent at times. Moves as A1/GB/W. UTI--Merrem. Denies pain. SW following-- pt's wife does not want pt to discharge to TCU due to distance. Discharge to home pending spouse and other support to be with pt during all mobility, PT recommends OP therapy if going home vs TCU.

## 2023-02-18 NOTE — PLAN OF CARE
Goal Outcome Evaluation:       A&OX2. VSS on room air. Up Ao1 with GBW. Denies pain. IV SL. Discharge pending.

## 2023-02-18 NOTE — PROGRESS NOTES
[facility]  Medicine Progress Note - Hospitalist Service       Date of Admission:  2/9/2023    Assessment & Plan   Sam Barry is a 77 year old male with hx of HTN and HLD who was admitted on 2/9/2023 for acute metabolic encephalopathy initially concerning for CVA. Pt was subsequently found to have an ESBL E.coli UTI    Acute metabolic encephalopathy due to ESBL E coli UTI, Resolving  * Presented with acute onset confusion; wife reports patient was speaking gibberish then became less responsive with subsequent shaking-like movement in his extremities  * Head CT/CTA and brain MRI on admission showed no acute pathology  * General Neurology consulted on admission  * Empirically initiated on levetiracetam on admission, but was subsequently discontinued following negative EEG. Neurology felt strongly that UTI was reason for AMS and not seizure  * Empirically initiated on ceftriaxone in the ED for UTI. Broadened to meropenem on 2/11 when urine culture returned with ESBL UTI.  * ID consulted during admission  - Now back to baseline mental status; oriented only to self and place, but otherwise alert and appropriate  - Continues on meropenem while hospitalized, Bactrim at discharge to complete total 10 day course per ID recs (last day: 2/20)  - 2/12 blood cultures x2 negative    Mild cognitive impairment  Reported parkinsonian features  * Wife reports shuffling gait, tremor, and cognitive impairment over the last 6 to 12 months  - Recommend follow up with General Neurology in 4-6 weeks once recovered from UTI     Back pain/spasm  - PRN Robaxin and heating pad ordered.      Otherwise chronic and stable medical conditions  Hypertension  Hyperlipidemia  Osteopenia     Diet: Regular Diet Adult  Room Service  Diet    DVT Prophylaxis: Pneumatic Compression Devices  Cardenas Catheter: Not present  Code Status: Full Code         Disposition: Expected discharge to TCU once placement found. Awaiting  insurance auth. Patient is medically ready.     The patient's care was discussed with the Patient and his spouse.  Patient's spouse states she prefers him to go to a TCU near their home.  Current placement is about an hour drive for her.  She recently had back surgery and will not be able to drive that far.  Requesting  to look for one closer. Total time spent on care today 35 Min.     Chandana Cantu MD  Hospitalist Service  [facility]  Contact information available via [facility] Paging/Directory    ______________________________________________________________________    Interval History     Chart reviewed and evaluated patient this morning.  Spouse at bedside.  Nursing staff as well as patient's spouse reported patient having back pain/spasm.  Spouse reports patient gets.  Deke back spasm during which he also feels his legs are jerking and increased tremor of his upper extremity as well.  No seizure/LOC.       Data reviewed today: I reviewed all medications, new labs results over the last 24 hours. I personally reviewed no images or EKG's today.    Physical Exam   Vital Signs: Temp: 98.4  F (36.9  C) Temp src: Oral BP: 127/84 Pulse: 84   Resp: 16 SpO2: 94 % O2 Device: None (Room air)    Weight: 181 lbs 10.54 oz     Constitutional: Resting comfortably, NAD  Respiratory: Breathing non-labored.   Skin/Integument: No rash  Musculoskeletal: Normal muscle bulk and tone  Neuro: Alert and appropriate, Oriented to self and knows he is in hospital, not to year or situation, MOYA  Psych: Calm and cooperative    Data   Recent Labs   Lab 02/13/23  2137 02/13/23  1659   * 97         No results found for this or any previous visit (from the past 24 hour(s)).    Medications       atorvastatin  20 mg Oral Daily     lisinopril  20 mg Oral Daily     meropenem  1 g Intravenous Q8H     methocarbamol  500 mg Oral BID     sodium chloride (PF)  3 mL Intracatheter Q8H

## 2023-02-19 PROCEDURE — 250N000013 HC RX MED GY IP 250 OP 250 PS 637: Performed by: HOSPITALIST

## 2023-02-19 PROCEDURE — 250N000011 HC RX IP 250 OP 636: Performed by: HOSPITALIST

## 2023-02-19 PROCEDURE — 99231 SBSQ HOSP IP/OBS SF/LOW 25: CPT | Performed by: HOSPITALIST

## 2023-02-19 PROCEDURE — 250N000013 HC RX MED GY IP 250 OP 250 PS 637: Performed by: STUDENT IN AN ORGANIZED HEALTH CARE EDUCATION/TRAINING PROGRAM

## 2023-02-19 PROCEDURE — 120N000001 HC R&B MED SURG/OB

## 2023-02-19 PROCEDURE — 250N000013 HC RX MED GY IP 250 OP 250 PS 637: Performed by: INTERNAL MEDICINE

## 2023-02-19 RX ADMIN — MEROPENEM 1 G: 1 INJECTION, POWDER, FOR SOLUTION INTRAVENOUS at 14:26

## 2023-02-19 RX ADMIN — METHOCARBAMOL 500 MG: 500 TABLET ORAL at 21:45

## 2023-02-19 RX ADMIN — LISINOPRIL 20 MG: 20 TABLET ORAL at 11:03

## 2023-02-19 RX ADMIN — MEROPENEM 1 G: 1 INJECTION, POWDER, FOR SOLUTION INTRAVENOUS at 22:53

## 2023-02-19 RX ADMIN — ACETAMINOPHEN 650 MG: 325 TABLET, FILM COATED ORAL at 07:42

## 2023-02-19 RX ADMIN — METHOCARBAMOL 500 MG: 500 TABLET ORAL at 07:51

## 2023-02-19 RX ADMIN — ATORVASTATIN CALCIUM 20 MG: 20 TABLET, FILM COATED ORAL at 11:04

## 2023-02-19 RX ADMIN — MEROPENEM 1 G: 1 INJECTION, POWDER, FOR SOLUTION INTRAVENOUS at 05:52

## 2023-02-19 ASSESSMENT — ACTIVITIES OF DAILY LIVING (ADL)
ADLS_ACUITY_SCORE: 43
ADLS_ACUITY_SCORE: 45
ADLS_ACUITY_SCORE: 43

## 2023-02-19 NOTE — PLAN OF CARE
Goal Outcome Evaluation:       Pt here with Acute metabolic encephalopathy and UTI. A&O to self, disoriented to place and time. Neuros intact ex. Confusion at times. BLE weakness, slow gait movement. VSS, on RA. Tele N/A. reg diet, thin liquids. Takes pills whole with water. Up with A1 GB W. Back pain mostly with movement. Pt scoring green on the Aggression Stop Light Tool. Incontinent at times. Plan to discharge to a TCU vs Home with therapies, then neurology outpatient for slow gait shuffle . Pending discharge/placement.

## 2023-02-19 NOTE — PLAN OF CARE
Goal Outcome Evaluation:      Plan of Care Reviewed With: patient        UTI/encephalopathy. Disoriented to situation. Arouses to voice & spontaneous; letting his needs be known; denies decreased sensation; no dizziness or shortness of breath.  VSS, RA. Regular diet/set up provided/good appetite. Up with 1-2 assist/gait belt & walker to recliner/tolerated up in chair for half the shift. Incontinent & continent of urine via urinal/no bm today. Right side back pain/robaxin, tylenol, & warm packs helpful. Pt scoring green on the Aggression Stop Light Tool. Discharge plan pending until medically stable. TCU needs at discharge possible.

## 2023-02-19 NOTE — PROGRESS NOTES
Care Management Follow Up    Length of Stay (days): 10    Expected Discharge Date: 02/22/2023     Concerns to be Addressed:       Patient plan of care discussed at interdisciplinary rounds: Yes    Anticipated Discharge Disposition: Transitional Care     Anticipated Discharge Services: None  Anticipated Discharge DME: None    Patient/family educated on Medicare website which has current facility and service quality ratings: yes  Education Provided on the Discharge Plan:    Patient/Family in Agreement with the Plan: yes    Referrals Placed by CM/SW:    Private pay costs discussed: Not applicable    Additional Information:  SW stopped by pt room x 2 to discuss TCU with pt wife.  SW left voicemail for spouse as well.  SW intends to discuss with spouse that pt has been accepted to [facility] and that it would be a 10-15 minute drive from her house (spouse had surgery recently and distance to TCU is very important to her).  Will need [employer] auth.        Mili Ball, DONELLSW

## 2023-02-19 NOTE — PLAN OF CARE
Goal Outcome Evaluation:    Shift summary from [id number]  Alert and oriented self and disoriented to place and time, VSS on RA,  Urinal at bedside with adequate urine output denies pain and discharge pending

## 2023-02-19 NOTE — PROGRESS NOTES
[facility]  Medicine Progress Note - Hospitalist Service       Date of Admission:  2/9/2023    Assessment & Plan   Sam Barry is a 77 year old male with hx of HTN and HLD who was admitted on 2/9/2023 for acute metabolic encephalopathy initially concerning for CVA. Pt was subsequently found to have an ESBL E.coli UTI    Acute metabolic encephalopathy due to ESBL E coli UTI, Resolving  * Presented with acute onset confusion; wife reports patient was speaking gibberish then became less responsive with subsequent shaking-like movement in his extremities  * Head CT/CTA and brain MRI on admission showed no acute pathology  * General Neurology consulted on admission  * Empirically initiated on levetiracetam on admission, but was subsequently discontinued following negative EEG. Neurology felt strongly that UTI was reason for AMS and not seizure  * Empirically initiated on ceftriaxone in the ED for UTI. Broadened to meropenem on 2/11 when urine culture returned with ESBL UTI.  * ID consulted during admission  - Now back to baseline mental status; oriented only to self and place, but otherwise alert and appropriate  - Continues on meropenem while hospitalized, Bactrim at discharge to complete total 10 day course per ID recs (last day: 2/20)  - 2/12 blood cultures x2 negative    Mild cognitive impairment  Reported parkinsonian features  * Wife reports shuffling gait, tremor, and cognitive impairment over the last 6 to 12 months  - Recommend follow up with General Neurology in 4-6 weeks once recovered from UTI     Back pain/spasm  - PRN Robaxin and heating pad        Otherwise chronic and stable medical conditions  Hypertension  Hyperlipidemia  Osteopenia     Diet: Regular Diet Adult  Room Service  Diet    DVT Prophylaxis: Pneumatic Compression Devices  Cardenas Catheter: Not present  Code Status: Full Code         Disposition: Expected discharge to TCU once placement found. Awaiting insurance  auth. Patient is medically ready.     The patient's care was discussed with the Patient and his spouse.  Patient's spouse states she prefers him to go to a TCU near their home.  Current placement is about an hour drive for her.  She recently had back surgery and will not be able to drive that far and requesting  to look for one closer. SW following for discharge planning.     Chandana Cantu MD  Hospitalist Service  [facility]  Contact information available via [facility] Paging/Directory    ______________________________________________________________________    Interval History     Chart reviewed and evaluated patient this morning. No aucte issues reported. Patient states back pain is unchanged. Using robaxin.      Data reviewed today: I reviewed all medications, new labs results over the last 24 hours. I personally reviewed no images or EKG's today.    Physical Exam   Vital Signs: Temp: 98.2  F (36.8  C) Temp src: Oral BP: 116/72 Pulse: 108   Resp: 16 SpO2: 96 % O2 Device: None (Room air)    Weight: 181 lbs 10.54 oz     Constitutional: Resting comfortably, NAD  Respiratory: Breathing non-labored.   Skin/Integument: No rash  Musculoskeletal: Normal muscle bulk and tone  Neuro: Alert and appropriate, Oriented to self and knows he is in hospital, not to year or situation, MOYA  Psych: Calm and cooperative    Data   Recent Labs   Lab 02/13/23  2137 02/13/23  1659   * 97         No results found for this or any previous visit (from the past 24 hour(s)).    Medications       atorvastatin  20 mg Oral Daily     lisinopril  20 mg Oral Daily     meropenem  1 g Intravenous Q8H     methocarbamol  500 mg Oral BID     sodium chloride (PF)  3 mL Intracatheter Q8H

## 2023-02-20 ENCOUNTER — APPOINTMENT (OUTPATIENT)
Dept: OCCUPATIONAL THERAPY | Facility: CLINIC | Age: 78
DRG: 689 | End: 2023-02-20
Attending: INTERNAL MEDICINE
Payer: COMMERCIAL

## 2023-02-20 VITALS
OXYGEN SATURATION: 97 % | TEMPERATURE: 97.7 F | BODY MASS INDEX: 26.91 KG/M2 | RESPIRATION RATE: 16 BRPM | HEIGHT: 69 IN | HEART RATE: 105 BPM | WEIGHT: 181.66 LBS | DIASTOLIC BLOOD PRESSURE: 79 MMHG | SYSTOLIC BLOOD PRESSURE: 116 MMHG

## 2023-02-20 PROCEDURE — 250N000011 HC RX IP 250 OP 636: Performed by: HOSPITALIST

## 2023-02-20 PROCEDURE — 97535 SELF CARE MNGMENT TRAINING: CPT | Mod: GO

## 2023-02-20 PROCEDURE — 99239 HOSP IP/OBS DSCHRG MGMT >30: CPT | Performed by: HOSPITALIST

## 2023-02-20 PROCEDURE — 250N000013 HC RX MED GY IP 250 OP 250 PS 637: Performed by: INTERNAL MEDICINE

## 2023-02-20 PROCEDURE — 250N000013 HC RX MED GY IP 250 OP 250 PS 637: Performed by: HOSPITALIST

## 2023-02-20 RX ORDER — METHOCARBAMOL 500 MG/1
500 TABLET, FILM COATED ORAL 2 TIMES DAILY PRN
Qty: 20 TABLET | Refills: 0 | Status: SHIPPED | OUTPATIENT
Start: 2023-02-20

## 2023-02-20 RX ADMIN — MEROPENEM 1 G: 1 INJECTION, POWDER, FOR SOLUTION INTRAVENOUS at 05:42

## 2023-02-20 RX ADMIN — ATORVASTATIN CALCIUM 20 MG: 20 TABLET, FILM COATED ORAL at 08:39

## 2023-02-20 RX ADMIN — LISINOPRIL 20 MG: 20 TABLET ORAL at 08:39

## 2023-02-20 RX ADMIN — MEROPENEM 1 G: 1 INJECTION, POWDER, FOR SOLUTION INTRAVENOUS at 14:18

## 2023-02-20 RX ADMIN — METHOCARBAMOL 500 MG: 500 TABLET ORAL at 08:39

## 2023-02-20 ASSESSMENT — ACTIVITIES OF DAILY LIVING (ADL)
ADLS_ACUITY_SCORE: 43

## 2023-02-20 NOTE — PLAN OF CARE
Goal Outcome Evaluation:    [id number]    Pt here with Acute metabolic encephalopathy and UTI. A&O to self, disoriented to place and time. Neuros intact ex. Confusion at times. BLE weakness, slow gait movement. VSS, on RA. Tele N/A. reg diet, thin liquids. Takes pills whole with water. Up with A1 GB W. Back pain mostly with movement. Pt scoring green on the Aggression Stop Light Tool. Incontinent at times, was incontinent overnight. Plan to discharge to a TCU vs Home with therapies, then neurology outpatient for slow gait shuffle. Pending discharge/placement.    No acute events overnight.

## 2023-02-20 NOTE — PLAN OF CARE
Pt here with encephalopathy and UTI    Neuros stable. VSS RA. Discharging home with outpatient therapies. Spouse to transport.

## 2023-02-20 NOTE — PROGRESS NOTES
Care Management Follow Up    Length of Stay (days): 11    Expected Discharge Date: 02/20/2023     Concerns to be Addressed:       Patient plan of care discussed at interdisciplinary rounds: Yes    Anticipated Discharge Disposition: Transitional Care     Anticipated Discharge Services: None  Anticipated Discharge DME: None    Patient/family educated on Medicare website which has current facility and service quality ratings: yes  Education Provided on the Discharge Plan:  Yes. Pt's wife had been planning a home discharge on Friday over the weekend.  It appears per notes that she is wanting TCU close to home.  Pt was accepted by [facility] that is 18 miles and 33 minutes away. Pt has been accepted at [facility] which is 13.1 miles and 25 minutes away.  SW called Netti with [facility] and she indicates that they do have a male bed available and referral sent for review.  [facility] is 1.5  Miles and 6 minutes away.  Pt has [employer] insurance.    Patient/Family in Agreement with the Plan:     Referrals Placed by CM/LISET:  Referral to [facility].  Private pay costs discussed: transportation    Additional Information:  SW tried to call wife for discharge update. Message left requesting return call x2.    LISET met with pt's wife in pt room per request. SW reviewed all options. Pt and wife would like to discharge pt home today with orders for OP therapies. Home care was offered but pt and wife choose out patient therapy.  Hospitalist and bedside RN updated.  Anticipate discharge this afternoon.    LEIDY Hinkle  [facility]  Care Transitions  [phone]

## 2023-02-20 NOTE — DISCHARGE SUMMARY
[facility]  Hospitalist Discharge Summary      Date of Admission:  2/9/2023  Date of Discharge:  2/20/2023  Discharging Provider: Chandana Cantu MD  Discharge Service: Hospitalist Service    Discharge Diagnoses     Please refer to the hospital course below    Follow-ups Needed After Discharge   Follow-up Appointments     Follow-up and recommended labs and tests       Follow up with primary care provider, Jovan [facility], within 7   days for hospital follow- up.               Unresulted Labs Ordered in the Past 30 Days of this Admission     No orders found from 1/10/2023 to 2/10/2023.      These results will be followed up by none    Discharge Disposition   Discharged to home  Condition at discharge: Stable     Hospital Course   Sam Barry is a 77 year old male with hx of HTN and HLD who was admitted on 2/9/2023 for acute metabolic encephalopathy initially concerning for CVA. Pt was subsequently found to have an ESBL E.coli UTI    Acute metabolic encephalopathy due to ESBL E coli UTI, Resolving  * Presented with acute onset confusion; wife reports patient was speaking gibberish then became less responsive with subsequent shaking-like movement in his extremities  * Head CT/CTA and brain MRI on admission showed no acute pathology  * General Neurology consulted on admission  * Empirically initiated on levetiracetam on admission, but was subsequently discontinued following negative EEG. Neurology felt strongly that UTI was reason for AMS and not seizure  * Empirically initiated on ceftriaxone in the ED for UTI. Broadened to meropenem on 2/11 when urine culture returned with ESBL UTI.  * ID consulted during admission  - Now back to baseline mental status; oriented only to self and place, but otherwise alert and appropriate  - Continues on meropenem while hospitalized, Bactrim at discharge to complete total 10 day course per ID.  Patient has completed 10 days of antibiotic while  in the hospital today.  - 2/12 blood cultures x2 negative    Mild cognitive impairment  Reported parkinsonian features  * Wife reports shuffling gait, tremor, and cognitive impairment over the last 6 to 12 months  - Recommend follow up with General Neurology in 4-6 weeks once recovered from UTI    Evaluated by PT and OT.  TCU was recommended and was arranged by .  Wife stated it was too far and she would not be able to drive given her recent back surgery.  Another care facility just nearby their home was found but she decided to take him home.  She declined home cares as well     Back pain/spasm  - PRN Robaxin and heating pad      Otherwise chronic and stable medical conditions  Hypertension  Hyperlipidemia  Osteopenia    Consultations This Hospital Stay   NEUROLOGY IP CONSULT  PHYSICAL THERAPY ADULT IP CONSULT  OCCUPATIONAL THERAPY ADULT IP CONSULT  INFECTIOUS DISEASES IP CONSULT  CARE MANAGEMENT / SOCIAL WORK IP CONSULT  PHYSICAL THERAPY ADULT IP CONSULT  OCCUPATIONAL THERAPY ADULT IP CONSULT    Code Status   Full Code    Time Spent on this Encounter   IChandana MD, personally saw the patient today and spent greater than 30 minutes discharging this patient.       Chandana Cantu MD  [facility] NEUROSCIENCE UNIT  [address] [place] [address]  Phone: [phone]  ______________________________________________________________________    Physical Exam   Vital Signs: Temp: 97.7  F (36.5  C) Temp src: Oral BP: 116/79 Pulse: 105   Resp: 16 SpO2: 97 % O2 Device: None (Room air)    Weight: 181 lbs 10.54 oz    Constitutional: Resting comfortably, NAD  Respiratory: Breathing non-labored.   Skin/Integument: No rash  Musculoskeletal: Normal muscle bulk and tone  Neuro: Alert and appropriate, Oriented to self and knows he is in hospital, not to year or situation, MOYA  Psych: Calm and cooperative       Primary Care Physician   Jovan [facility]    Discharge Orders       Physical Therapy Referral      Occupational Therapy Referral      Reason for your hospital stay    ESBL E.coli UTI which is being treated with antibiotics     Activity - Up with nursing assistance     Follow-up and recommended labs and tests     Follow up with primary care provider, Jovan [facility], within 7 days for hospital follow- up.     Activity    Your activity upon discharge: activity as tolerated     Fall precautions     Diet    Follow this diet upon discharge: Regular diet     Diet    Follow this diet upon discharge: Orders Placed This Encounter      Room Service      Regular Diet Adult      Diet       Significant Results and Procedures   Most Recent 3 CBC's:Recent Labs   Lab Test 02/10/23  0803 02/09/23  1428 09/21/20  2053   WBC 9.8 11.0 10.5   HGB 13.4 13.8 14.7   MCV 90 90 89    277 255     Most Recent 3 BMP's:Recent Labs   Lab Test 02/13/23  2137 02/13/23  1659 02/10/23  0803 02/09/23  1428 02/09/23  1428 09/21/20  2053   NA  --   --  136  --  138 141   POTASSIUM  --   --  3.5  --  3.8 3.6   CHLORIDE  --   --  101  --  98 108   CO2  --   --  26  --  20* 26   BUN  --   --  8.6  --  10.1 15   CR  --   --  0.99  --  1.09 1.09   ANIONGAP  --   --  9  --  20* 7   KATIE  --   --  9.4  --  9.9 9.6   * 97 105*   < > 159* 116*    < > = values in this interval not displayed.     Most Recent 2 LFT's:No lab results found.  Most Recent 3 Hemoglobins:Recent Labs   Lab Test 02/10/23  0803 02/09/23  1428 09/21/20  2053   HGB 13.4 13.8 14.7     7-Day Micro Results     No results found for the last 168 hours.        Most Recent TSH and T4:Recent Labs   Lab Test 09/21/20 2053   TSH 0.71     Most Recent 6 glucoses:Recent Labs   Lab Test 02/13/23  2137 02/13/23  1659 02/10/23  0803 02/09/23  1428 09/21/20  2053 03/15/18  1011   * 97 105* 159* 116* 124*     Most Recent Urinalysis:Recent Labs   Lab Test 02/10/23  0207 08/07/18  1434 07/18/18  0814   COLOR Light Yellow   < > Yellow   APPEARANCE  Clear   < > Clear   URINEGLC Negative   < > Negative   URINEBILI Negative   < > Negative   URINEKETONE Negative   < > Negative   SG 1.021   < > >1.030   UBLD Negative   < > Trace*   URINEPH 6.0   < > 5.0   PROTEIN Negative   < > 30*   UROBILINOGEN  --   --  0.2   NITRITE Negative   < > Negative   LEUKEST Large*   < > Negative   RBCU 1   < >  --    WBCU 17*   < >  --     < > = values in this interval not displayed.   ,   Results for orders placed or performed during the hospital encounter of 02/09/23   CT Head w/o Contrast    Narrative    CT OF THE HEAD WITHOUT CONTRAST   2/9/2023 2:37 PM     COMPARISON: 9/1/2020.    HISTORY: Code stroke. Altered mental status.    TECHNIQUE: Axial CT images of the head from the skull base to the  vertex were acquired without IV contrast. Dose reduction techniques  were used.     FINDINGS:   INTRACRANIAL CONTENTS: No intracranial hemorrhage, extraaxial  collection, or mass effect. No CT evidence of acute infarct. Moderate  presumed chronic small vessel ischemic change with moderate  generalized volume loss..    VISUALIZED ORBITS/SINUSES/MASTOIDS: No significant orbital  abnormality. No significant paranasal sinus mucosal disease. No  significant middle ear or mastoid effusion.    OSSEOUS STRUCTURES/SOFT TISSUES: No significant abnormality.      Impression    IMPRESSION:  1.  No acute intracranial abnormality.    PENNIE AGUSTIN MD         SYSTEM ID:  [id number]   CTA Head Neck w Contrast    Narrative    CT ANGIOGRAM OF THE HEAD AND NECK WITH CONTRAST  2/9/2023 2:43 PM     COMPARISON: Head CT 9/21/2020.    HISTORY: Code stroke. Altered mental status.    TECHNIQUE:    Precontrast localizing scans were followed by CT angiography with an  injection of 75mL Isovue-370 nonionic intravenous contrast material  with scans through the head and neck.  Images were transferred to a  separate 3-D workstation where multiplanar reformations and 3-D images  were created. Estimates of carotid  stenoses are made relative to the  distal internal carotid artery diameters except as noted. Dose  reduction techniques were used.    FINDINGS:   HEAD CTA:  ANTERIOR CIRCULATION: Minimal plaque within the carotid siphons. No  significant stenosis or occlusion. Standard [place] of [place] anatomy.    POSTERIOR CIRCULATION: No significant stenosis or occlusion. Balanced  vertebral arteries supply a normal basilar artery.    ANEURYSM/VASCULAR MALFORMATION: None.    NECK CTA:  RIGHT CAROTID: No measurable stenosis in the right ICA based on NASCET  criteria. Tortuous and mildly ectatic distal cervical ICA.    LEFT CAROTID: No measurable stenosis in the left ICA based on NASCET  criteria. Tortuous and mildly ectatic distal cervical ICA.     VERTEBRAL ARTERIES: Balanced vertebral arteries are patent in the neck  and into the head.     AORTIC ARCH: Classic aortic arch anatomy with no significant stenosis  at the origin of the great vessels.      Impression    IMPRESSION:    HEAD CTA:  1.  No large artery stenosis or occlusion. No aneurysm.    NECK CTA:  1.  No measurable carotid or vertebral artery stenosis.  2.  Mildly ectatic and tortuous distal cervical internal carotid  arteries.    The results were communicated to Dr. Allen at 3:02 PM on 2/9/2023.    PENNIE AGUSTIN MD         SYSTEM ID:  [id number]   MR Brain w/o Contrast    Narrative    MRI BRAIN WITHOUT CONTRAST  2/9/2023 4:01 PM    HISTORY: Hyperacute MRI - please start with DWI then FLAIR and push  images through as they come.    TECHNIQUE: Multiplanar, multisequence MRI of the brain without  gadolinium IV contrast material.      COMPARISON: Same day head CT      Impression    IMPRESSION:   Motion limited exam. No evidence of acute ischemia or hemorrhage.  Volume loss and nonspecific confluent white matter hypoattenuation  which presumably represents advanced chronic small vessel ischemic  change. 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    AISSATOU GALLOWAY MD          SYSTEM ID:  [id number]   XR Chest 2 Views    Narrative    EXAM: XR CHEST 2 VIEWS  LOCATION: [facility]  DATE/TIME: 2/10/2023 2:15 AM    INDICATION: eval for infiltrate  COMPARISON: None.      Impression    IMPRESSION: No acute process. Kyphotic view. Low lung volumes. Elevation hemidiaphragms. No pleural effusions. Heart size normal.       Discharge Medications   Current Discharge Medication List      START taking these medications    Details   methocarbamol (ROBAXIN) 500 MG tablet Take 1 tablet (500 mg) by mouth 2 times daily as needed for muscle spasms  Qty: 20 tablet, Refills: 0    Associated Diagnoses: Back muscle spasm         CONTINUE these medications which have NOT CHANGED    Details   alendronate (FOSAMAX) 70 MG tablet Take 70 mg by mouth every 7 days      atorvastatin (LIPITOR) 20 MG tablet Take 20 mg by mouth daily With evening meal      lisinopril (ZESTRIL) 20 MG tablet Take 20 mg by mouth daily      Multiple Vitamin (MULTI-VITAMINS) TABS Take 1 tablet by mouth daily       VITAMIN D, CHOLECALCIFEROL, PO Take 1 tablet by mouth daily           Allergies   No Known Allergies

## 2023-02-21 NOTE — PLAN OF CARE
Occupational Therapy Discharge Summary    Reason for therapy discharge:    Discharged to home.    Progress towards therapy goal(s). See goals on Care Plan in [facility] electronic health record for goal details.  Goals not met.  Barriers to achieving goals:   discharge from facility.    Therapy recommendation(s):    Pt currently functioning below baseline level, primarily demo limitations in strength, functional mobility, cognition, and ADL independence. Pt currently A x 1 for all functional mobility and requires increased SBA for ADLs. Recommended TCU at discharge. However, pt's wife denies further therapy and decided to take pt home at this time.

## 2023-02-21 NOTE — PROGRESS NOTES
Physical Therapy Discharge Summary    Reason for therapy discharge:    Discharged to home.    Progress towards therapy goal(s). See goals on Care Plan in [facility] electronic health record for goal details.  Goals partially met.  Barriers to achieving goals:   discharge from facility.    Therapy recommendation(s):    Per chart, 400' CGA RW, recommending TCU.

## (undated) DEVICE — CATH FOLEY 3WAY 24FR 30ML LATEX 0167SI24

## (undated) DEVICE — CABLE HIGH FREQEUCY STERILE 8MM PLUG 300CM 277KB-D/10

## (undated) DEVICE — PACK TUR CUSTOM SBA15RUFSE

## (undated) DEVICE — BAG CYSTO TABLE DRAIN

## (undated) DEVICE — ESU GROUND PAD UNIVERSAL W/O CORD

## (undated) DEVICE — SOL WATER IRRIG 1000ML BOTTLE 2F7114

## (undated) DEVICE — SOL GLYCINE 1.5% 3000ML BAG 2B7317

## (undated) DEVICE — EVACUATOR BLADDER UROVAC LATEX M0067301250

## (undated) DEVICE — GLOVE PROTEXIS W/NEU-THERA 8.0  2D73TE80

## (undated) DEVICE — ESU ELEC LOOP 24FR 20750G

## (undated) DEVICE — BAG URINARY DRAIN 4000ML LF 153509

## (undated) DEVICE — DECANTER BAG 2002S

## (undated) DEVICE — PAD CHUX UNDERPAD 23X24" 7136

## (undated) RX ORDER — FENTANYL CITRATE 50 UG/ML
INJECTION, SOLUTION INTRAMUSCULAR; INTRAVENOUS
Status: DISPENSED
Start: 2018-09-18

## (undated) RX ORDER — PROPOFOL 10 MG/ML
INJECTION, EMULSION INTRAVENOUS
Status: DISPENSED
Start: 2018-09-18

## (undated) RX ORDER — LIDOCAINE HYDROCHLORIDE 20 MG/ML
INJECTION, SOLUTION EPIDURAL; INFILTRATION; INTRACAUDAL; PERINEURAL
Status: DISPENSED
Start: 2018-09-18

## (undated) RX ORDER — CEFAZOLIN SODIUM 2 G/100ML
INJECTION, SOLUTION INTRAVENOUS
Status: DISPENSED
Start: 2018-09-18

## (undated) RX ORDER — ONDANSETRON 2 MG/ML
INJECTION INTRAMUSCULAR; INTRAVENOUS
Status: DISPENSED
Start: 2018-09-18

## (undated) RX ORDER — DEXAMETHASONE SODIUM PHOSPHATE 4 MG/ML
INJECTION, SOLUTION INTRA-ARTICULAR; INTRALESIONAL; INTRAMUSCULAR; INTRAVENOUS; SOFT TISSUE
Status: DISPENSED
Start: 2018-09-18